# Patient Record
Sex: MALE | Race: WHITE | Employment: OTHER | URBAN - METROPOLITAN AREA
[De-identification: names, ages, dates, MRNs, and addresses within clinical notes are randomized per-mention and may not be internally consistent; named-entity substitution may affect disease eponyms.]

---

## 2021-10-23 ENCOUNTER — APPOINTMENT (OUTPATIENT)
Dept: CT IMAGING | Age: 71
DRG: 381 | End: 2021-10-23
Attending: EMERGENCY MEDICINE
Payer: MEDICARE

## 2021-10-23 ENCOUNTER — HOSPITAL ENCOUNTER (INPATIENT)
Age: 71
LOS: 3 days | Discharge: HOME | DRG: 381 | End: 2021-10-26
Attending: EMERGENCY MEDICINE | Admitting: HOSPITALIST
Payer: MEDICARE

## 2021-10-23 DIAGNOSIS — F10.10 ALCOHOL ABUSE: ICD-10-CM

## 2021-10-23 DIAGNOSIS — K92.2 UPPER GI BLEED: Primary | ICD-10-CM

## 2021-10-23 PROBLEM — K92.0 VOMITING BLOOD: Status: ACTIVE | Noted: 2021-10-23

## 2021-10-23 LAB
ABO + RH BLD: NORMAL
ALBUMIN SERPL-MCNC: 3.2 G/DL (ref 3.5–5)
ALBUMIN/GLOB SERPL: 0.8 {RATIO} (ref 1.1–2.2)
ALP SERPL-CCNC: 139 U/L (ref 45–117)
ALT SERPL-CCNC: 76 U/L (ref 12–78)
ANION GAP SERPL CALC-SCNC: 17 MMOL/L (ref 5–15)
APTT PPP: 23.3 SEC (ref 22.1–31)
AST SERPL-CCNC: 59 U/L (ref 15–37)
BASOPHILS # BLD: 0 K/UL (ref 0–0.1)
BASOPHILS NFR BLD: 1 % (ref 0–1)
BILIRUB SERPL-MCNC: 0.7 MG/DL (ref 0.2–1)
BLOOD GROUP ANTIBODIES SERPL: NORMAL
BNP SERPL-MCNC: 65 PG/ML (ref 0–125)
BUN SERPL-MCNC: 20 MG/DL (ref 6–20)
BUN/CREAT SERPL: 26 (ref 12–20)
CALCIUM SERPL-MCNC: 8.7 MG/DL (ref 8.5–10.1)
CHLORIDE SERPL-SCNC: 102 MMOL/L (ref 97–108)
CO2 SERPL-SCNC: 21 MMOL/L (ref 21–32)
COMMENT, HOLDF: NORMAL
CREAT SERPL-MCNC: 0.78 MG/DL (ref 0.7–1.3)
DIFFERENTIAL METHOD BLD: ABNORMAL
EOSINOPHIL # BLD: 0 K/UL (ref 0–0.4)
EOSINOPHIL NFR BLD: 0 % (ref 0–7)
ERYTHROCYTE [DISTWIDTH] IN BLOOD BY AUTOMATED COUNT: 15.3 % (ref 11.5–14.5)
ETHANOL SERPL-MCNC: 45 MG/DL
GLOBULIN SER CALC-MCNC: 4.1 G/DL (ref 2–4)
GLUCOSE SERPL-MCNC: 193 MG/DL (ref 65–100)
HCT VFR BLD AUTO: 37.2 % (ref 36.6–50.3)
HGB BLD-MCNC: 12.3 G/DL (ref 12.1–17)
IMM GRANULOCYTES # BLD AUTO: 0 K/UL (ref 0–0.04)
IMM GRANULOCYTES NFR BLD AUTO: 1 % (ref 0–0.5)
INR PPP: 1.3 (ref 0.9–1.1)
LYMPHOCYTES # BLD: 1.6 K/UL (ref 0.8–3.5)
LYMPHOCYTES NFR BLD: 22 % (ref 12–49)
MCH RBC QN AUTO: 31.5 PG (ref 26–34)
MCHC RBC AUTO-ENTMCNC: 33.1 G/DL (ref 30–36.5)
MCV RBC AUTO: 95.1 FL (ref 80–99)
MONOCYTES # BLD: 0.4 K/UL (ref 0–1)
MONOCYTES NFR BLD: 5 % (ref 5–13)
NEUTS SEG # BLD: 5.2 K/UL (ref 1.8–8)
NEUTS SEG NFR BLD: 71 % (ref 32–75)
NRBC # BLD: 0 K/UL (ref 0–0.01)
NRBC BLD-RTO: 0 PER 100 WBC
PLATELET # BLD AUTO: 234 K/UL (ref 150–400)
PMV BLD AUTO: 9.6 FL (ref 8.9–12.9)
POTASSIUM SERPL-SCNC: 3.6 MMOL/L (ref 3.5–5.1)
PROT SERPL-MCNC: 7.3 G/DL (ref 6.4–8.2)
PROTHROMBIN TIME: 13.1 SEC (ref 9–11.1)
RBC # BLD AUTO: 3.91 M/UL (ref 4.1–5.7)
SAMPLES BEING HELD,HOLD: NORMAL
SODIUM SERPL-SCNC: 140 MMOL/L (ref 136–145)
SPECIMEN EXP DATE BLD: NORMAL
THERAPEUTIC RANGE,PTTT: NORMAL SECS (ref 58–77)
TROPONIN-HIGH SENSITIVITY: 5 NG/L (ref 0–76)
TROPONIN-HIGH SENSITIVITY: 6 NG/L (ref 0–76)
WBC # BLD AUTO: 7.3 K/UL (ref 4.1–11.1)

## 2021-10-23 PROCEDURE — 74011250637 HC RX REV CODE- 250/637: Performed by: HOSPITALIST

## 2021-10-23 PROCEDURE — C9113 INJ PANTOPRAZOLE SODIUM, VIA: HCPCS | Performed by: EMERGENCY MEDICINE

## 2021-10-23 PROCEDURE — 86901 BLOOD TYPING SEROLOGIC RH(D): CPT

## 2021-10-23 PROCEDURE — 93005 ELECTROCARDIOGRAM TRACING: CPT

## 2021-10-23 PROCEDURE — 74011000250 HC RX REV CODE- 250: Performed by: EMERGENCY MEDICINE

## 2021-10-23 PROCEDURE — 74011250636 HC RX REV CODE- 250/636: Performed by: EMERGENCY MEDICINE

## 2021-10-23 PROCEDURE — 83880 ASSAY OF NATRIURETIC PEPTIDE: CPT

## 2021-10-23 PROCEDURE — 36415 COLL VENOUS BLD VENIPUNCTURE: CPT

## 2021-10-23 PROCEDURE — C9113 INJ PANTOPRAZOLE SODIUM, VIA: HCPCS | Performed by: HOSPITALIST

## 2021-10-23 PROCEDURE — 74011636637 HC RX REV CODE- 636/637: Performed by: EMERGENCY MEDICINE

## 2021-10-23 PROCEDURE — 85025 COMPLETE CBC W/AUTO DIFF WBC: CPT

## 2021-10-23 PROCEDURE — 84484 ASSAY OF TROPONIN QUANT: CPT

## 2021-10-23 PROCEDURE — 85730 THROMBOPLASTIN TIME PARTIAL: CPT

## 2021-10-23 PROCEDURE — 74011250636 HC RX REV CODE- 250/636: Performed by: HOSPITALIST

## 2021-10-23 PROCEDURE — 82077 ASSAY SPEC XCP UR&BREATH IA: CPT

## 2021-10-23 PROCEDURE — 94664 DEMO&/EVAL PT USE INHALER: CPT

## 2021-10-23 PROCEDURE — 74011000250 HC RX REV CODE- 250: Performed by: HOSPITALIST

## 2021-10-23 PROCEDURE — 71275 CT ANGIOGRAPHY CHEST: CPT

## 2021-10-23 PROCEDURE — 99285 EMERGENCY DEPT VISIT HI MDM: CPT

## 2021-10-23 PROCEDURE — 65660000000 HC RM CCU STEPDOWN

## 2021-10-23 PROCEDURE — 74011000636 HC RX REV CODE- 636: Performed by: EMERGENCY MEDICINE

## 2021-10-23 PROCEDURE — 94640 AIRWAY INHALATION TREATMENT: CPT

## 2021-10-23 PROCEDURE — 80053 COMPREHEN METABOLIC PANEL: CPT

## 2021-10-23 PROCEDURE — 77030029684 HC NEB SM VOL KT MONA -A

## 2021-10-23 PROCEDURE — 85610 PROTHROMBIN TIME: CPT

## 2021-10-23 RX ORDER — PREDNISONE 20 MG/1
TABLET ORAL
Status: ON HOLD | COMMUNITY
Start: 2021-10-15 | End: 2021-10-26

## 2021-10-23 RX ORDER — LANOLIN ALCOHOL/MO/W.PET/CERES
325 CREAM (GRAM) TOPICAL 2 TIMES DAILY WITH MEALS
Status: ON HOLD | COMMUNITY
Start: 2021-03-08 | End: 2021-10-26

## 2021-10-23 RX ORDER — SERTRALINE HYDROCHLORIDE 50 MG/1
50 TABLET, FILM COATED ORAL DAILY
Status: ON HOLD | COMMUNITY
Start: 2021-07-01 | End: 2021-10-26

## 2021-10-23 RX ORDER — ARFORMOTEROL TARTRATE 15 UG/2ML
15 SOLUTION RESPIRATORY (INHALATION)
Status: DISCONTINUED | OUTPATIENT
Start: 2021-10-23 | End: 2021-10-26 | Stop reason: HOSPADM

## 2021-10-23 RX ORDER — BUDESONIDE 0.5 MG/2ML
500 INHALANT ORAL 2 TIMES DAILY
COMMUNITY

## 2021-10-23 RX ORDER — SODIUM CHLORIDE 0.9 % (FLUSH) 0.9 %
5-40 SYRINGE (ML) INJECTION AS NEEDED
Status: DISCONTINUED | OUTPATIENT
Start: 2021-10-23 | End: 2021-10-26 | Stop reason: HOSPADM

## 2021-10-23 RX ORDER — MONTELUKAST SODIUM 10 MG/1
10 TABLET ORAL
Status: ON HOLD | COMMUNITY
Start: 2021-04-27 | End: 2021-10-26

## 2021-10-23 RX ORDER — BUDESONIDE 0.5 MG/2ML
500 INHALANT ORAL
Status: DISCONTINUED | OUTPATIENT
Start: 2021-10-23 | End: 2021-10-26 | Stop reason: HOSPADM

## 2021-10-23 RX ORDER — SERTRALINE HYDROCHLORIDE 50 MG/1
50 TABLET, FILM COATED ORAL DAILY
Status: DISCONTINUED | OUTPATIENT
Start: 2021-10-24 | End: 2021-10-26 | Stop reason: HOSPADM

## 2021-10-23 RX ORDER — ACETAMINOPHEN 650 MG/1
650 SUPPOSITORY RECTAL
Status: DISCONTINUED | OUTPATIENT
Start: 2021-10-23 | End: 2021-10-26 | Stop reason: HOSPADM

## 2021-10-23 RX ORDER — IPRATROPIUM BROMIDE AND ALBUTEROL SULFATE 2.5; .5 MG/3ML; MG/3ML
3 SOLUTION RESPIRATORY (INHALATION)
Status: DISCONTINUED | OUTPATIENT
Start: 2021-10-23 | End: 2021-10-24

## 2021-10-23 RX ORDER — MONTELUKAST SODIUM 10 MG/1
10 TABLET ORAL
Status: DISCONTINUED | OUTPATIENT
Start: 2021-10-23 | End: 2021-10-26 | Stop reason: HOSPADM

## 2021-10-23 RX ORDER — ONDANSETRON 2 MG/ML
4 INJECTION INTRAMUSCULAR; INTRAVENOUS
Status: COMPLETED | OUTPATIENT
Start: 2021-10-23 | End: 2021-10-23

## 2021-10-23 RX ORDER — LORAZEPAM 1 MG/1
0.5 TABLET ORAL
COMMUNITY
Start: 2021-09-27

## 2021-10-23 RX ORDER — ONDANSETRON 4 MG/1
4 TABLET, ORALLY DISINTEGRATING ORAL
Status: DISCONTINUED | OUTPATIENT
Start: 2021-10-23 | End: 2021-10-26 | Stop reason: HOSPADM

## 2021-10-23 RX ORDER — ONDANSETRON 2 MG/ML
4 INJECTION INTRAMUSCULAR; INTRAVENOUS
Status: DISCONTINUED | OUTPATIENT
Start: 2021-10-23 | End: 2021-10-26 | Stop reason: HOSPADM

## 2021-10-23 RX ORDER — ONDANSETRON 4 MG/1
4 TABLET, FILM COATED ORAL
COMMUNITY
Start: 2021-09-27

## 2021-10-23 RX ORDER — SODIUM CHLORIDE 0.9 % (FLUSH) 0.9 %
5-40 SYRINGE (ML) INJECTION EVERY 8 HOURS
Status: DISCONTINUED | OUTPATIENT
Start: 2021-10-23 | End: 2021-10-26 | Stop reason: HOSPADM

## 2021-10-23 RX ORDER — LORAZEPAM 2 MG/ML
4 INJECTION INTRAMUSCULAR
Status: DISCONTINUED | OUTPATIENT
Start: 2021-10-23 | End: 2021-10-26 | Stop reason: HOSPADM

## 2021-10-23 RX ORDER — ALBUTEROL SULFATE 90 UG/1
2 AEROSOL, METERED RESPIRATORY (INHALATION)
COMMUNITY
Start: 2021-10-06

## 2021-10-23 RX ORDER — POLYETHYLENE GLYCOL 3350 17 G/17G
17 POWDER, FOR SOLUTION ORAL DAILY PRN
Status: DISCONTINUED | OUTPATIENT
Start: 2021-10-23 | End: 2021-10-26 | Stop reason: HOSPADM

## 2021-10-23 RX ORDER — ACAMPROSATE CALCIUM 333 MG/1
TABLET, DELAYED RELEASE ORAL
Status: ON HOLD | COMMUNITY
Start: 2021-09-28 | End: 2021-10-26

## 2021-10-23 RX ORDER — ACETAMINOPHEN 325 MG/1
650 TABLET ORAL
Status: DISCONTINUED | OUTPATIENT
Start: 2021-10-23 | End: 2021-10-26 | Stop reason: HOSPADM

## 2021-10-23 RX ORDER — FOLIC ACID 1 MG/1
1 TABLET ORAL DAILY
Status: ON HOLD | COMMUNITY
End: 2021-10-26

## 2021-10-23 RX ORDER — ONDANSETRON 2 MG/ML
4 INJECTION INTRAMUSCULAR; INTRAVENOUS
Status: DISCONTINUED | OUTPATIENT
Start: 2021-10-23 | End: 2021-10-23 | Stop reason: SDUPTHER

## 2021-10-23 RX ORDER — FLUTICASONE PROPIONATE AND SALMETEROL XINAFOATE 45; 21 UG/1; UG/1
2 AEROSOL, METERED RESPIRATORY (INHALATION) 2 TIMES DAILY
COMMUNITY
Start: 2021-10-15

## 2021-10-23 RX ORDER — LORAZEPAM 2 MG/ML
2 INJECTION INTRAMUSCULAR
Status: DISCONTINUED | OUTPATIENT
Start: 2021-10-23 | End: 2021-10-26 | Stop reason: HOSPADM

## 2021-10-23 RX ORDER — OCTREOTIDE ACETATE 100 UG/ML
50 INJECTION, SOLUTION INTRAVENOUS; SUBCUTANEOUS
Status: COMPLETED | OUTPATIENT
Start: 2021-10-23 | End: 2021-10-23

## 2021-10-23 RX ORDER — MULTIVITAMIN WITH IRON
1 TABLET ORAL DAILY
Status: ON HOLD | COMMUNITY
Start: 2021-10-15 | End: 2021-10-26

## 2021-10-23 RX ORDER — SODIUM CHLORIDE 9 MG/ML
100 INJECTION, SOLUTION INTRAVENOUS CONTINUOUS
Status: DISCONTINUED | OUTPATIENT
Start: 2021-10-23 | End: 2021-10-26 | Stop reason: HOSPADM

## 2021-10-23 RX ADMIN — MONTELUKAST 10 MG: 10 TABLET, FILM COATED ORAL at 21:01

## 2021-10-23 RX ADMIN — SODIUM CHLORIDE 80 MG: 9 INJECTION, SOLUTION INTRAMUSCULAR; INTRAVENOUS; SUBCUTANEOUS at 16:20

## 2021-10-23 RX ADMIN — METHYLPREDNISOLONE SODIUM SUCCINATE 20 MG: 40 INJECTION, POWDER, FOR SOLUTION INTRAMUSCULAR; INTRAVENOUS at 21:01

## 2021-10-23 RX ADMIN — ARFORMOTEROL TARTRATE 15 MCG: 15 SOLUTION RESPIRATORY (INHALATION) at 20:38

## 2021-10-23 RX ADMIN — Medication 10 ML: at 21:02

## 2021-10-23 RX ADMIN — SODIUM CHLORIDE 1000 ML: 9 INJECTION, SOLUTION INTRAVENOUS at 13:29

## 2021-10-23 RX ADMIN — IPRATROPIUM BROMIDE AND ALBUTEROL SULFATE 3 ML: .5; 3 SOLUTION RESPIRATORY (INHALATION) at 20:38

## 2021-10-23 RX ADMIN — FOLIC ACID: 5 INJECTION, SOLUTION INTRAMUSCULAR; INTRAVENOUS; SUBCUTANEOUS at 22:04

## 2021-10-23 RX ADMIN — BUDESONIDE 500 MCG: 0.5 INHALANT RESPIRATORY (INHALATION) at 20:38

## 2021-10-23 RX ADMIN — ONDANSETRON 4 MG: 2 INJECTION INTRAMUSCULAR; INTRAVENOUS at 17:37

## 2021-10-23 RX ADMIN — OCTREOTIDE ACETATE 50 MCG: 100 INJECTION, SOLUTION INTRAVENOUS; SUBCUTANEOUS at 17:51

## 2021-10-23 RX ADMIN — SODIUM CHLORIDE 100 ML/HR: 9 INJECTION, SOLUTION INTRAVENOUS at 17:52

## 2021-10-23 RX ADMIN — SODIUM CHLORIDE 40 MG: 9 INJECTION INTRAMUSCULAR; INTRAVENOUS; SUBCUTANEOUS at 21:01

## 2021-10-23 RX ADMIN — IOPAMIDOL 80 ML: 755 INJECTION, SOLUTION INTRAVENOUS at 14:19

## 2021-10-23 NOTE — ED PROVIDER NOTES
The history is provided by the patient. Shortness of Breath  This is a recurrent problem. The problem occurs intermittently. The current episode started 6 to 12 hours ago. The problem has not changed since onset. Associated symptoms include cough, sputum production, chest pain and leg swelling. Pertinent negatives include no fever, no headaches, no coryza, no rhinorrhea, no sore throat, no swollen glands, no ear pain, no neck pain, no hemoptysis, no wheezing, no PND, no orthopnea, no syncope, no vomiting, no abdominal pain, no rash, no leg pain and no claudication. He has tried nothing for the symptoms. The treatment provided no relief. He has had prior hospitalizations. He has had prior ED visits. Associated medical issues include asthma and PE. Associated medical issues do not include COPD. History reviewed. No pertinent past medical history. History reviewed. No pertinent surgical history. History reviewed. No pertinent family history. Social History     Socioeconomic History    Marital status:      Spouse name: Not on file    Number of children: Not on file    Years of education: Not on file    Highest education level: Not on file   Occupational History    Not on file   Tobacco Use    Smoking status: Never Smoker   Vaping Use    Vaping Use: Never used   Substance and Sexual Activity    Alcohol use: Yes     Comment: Daily drinker; 4oz     Drug use: Never    Sexual activity: Not on file   Other Topics Concern    Not on file   Social History Narrative    Not on file     Social Determinants of Health     Financial Resource Strain:     Difficulty of Paying Living Expenses:    Food Insecurity:     Worried About Running Out of Food in the Last Year:     [address] in the Last Year:    Transportation Needs:     Lack of Transportation (Medical):      Lack of Transportation (Non-Medical):    Physical Activity:     Days of Exercise per Week:     Minutes of Exercise per Session:    Stress:     Feeling of Stress :    Social Connections:     Frequency of Communication with Friends and Family:     Frequency of Social Gatherings with Friends and Family:     Attends [employer] Services:     Active Member of Clubs or Organizations:     Attends Club or Organization Meetings:     Marital Status:    Intimate Partner Violence:     Fear of Current or Ex-Partner:     Emotionally Abused:     Physically Abused:     Sexually Abused: ALLERGIES: Patient has no known allergies. Review of Systems   Constitutional: Negative for activity change, chills and fever. HENT: Negative for ear pain, nosebleeds, rhinorrhea, sore throat, trouble swallowing and voice change. Eyes: Negative for visual disturbance. Respiratory: Positive for cough, sputum production and shortness of breath. Negative for hemoptysis and wheezing. Cardiovascular: Positive for chest pain and leg swelling. Negative for palpitations, orthopnea, claudication, syncope and PND. Gastrointestinal: Negative for abdominal pain, constipation, diarrhea, nausea and vomiting. Genitourinary: Negative for difficulty urinating, dysuria, hematuria and urgency. Musculoskeletal: Negative for back pain, neck pain and neck stiffness. Skin: Negative for color change and rash. Allergic/Immunologic: Negative for immunocompromised state. Neurological: Negative for dizziness, seizures, syncope, weakness, light-headedness, numbness and headaches. Psychiatric/Behavioral: Negative for behavioral problems, confusion, hallucinations, self-injury and suicidal ideas. There were no vitals filed for this visit. Physical Exam  Vitals and nursing note reviewed. Constitutional:       General: He is not in acute distress. Appearance: He is well-developed. He is not diaphoretic. HENT:      Head: Normocephalic and atraumatic. Eyes:      Pupils: Pupils are equal, round, and reactive to light. Cardiovascular:      Rate and Rhythm: Regular rhythm. Tachycardia present. Heart sounds: Normal heart sounds. No murmur heard. No friction rub. No gallop. Pulmonary:      Effort: Pulmonary effort is normal. No respiratory distress. Breath sounds: Normal breath sounds. No wheezing. Abdominal:      General: Bowel sounds are normal. There is no distension. Palpations: Abdomen is soft. Tenderness: There is no abdominal tenderness. There is no guarding or rebound. Musculoskeletal:         General: Normal range of motion. Cervical back: Normal range of motion and neck supple. Skin:     General: Skin is warm. Findings: No rash. Neurological:      Mental Status: He is alert and oriented to person, place, and time. Psychiatric:         Behavior: Behavior normal.         Thought Content: Thought content normal.         Judgment: Judgment normal.          MDM     This is a 66-year-old male with past medical history, review of systems, physical exam as above, presenting with complaints of shortness of breath, cough. Patient states a history of asthma, states he is unclear as to his medications but states he is compliant with them. Chart review indicates patient was evaluated at [facility] [place] this morning as well as yesterday. Patient states he presented, however was not seen. Chart review indicates a history of asthma, alcohol abuse, PE. Patient states PE is a recent diagnosis he is compliant with novel anticoagulants. He endorses chest pain \"last week\". He states cough productive of white sputum, he states he did not receive nebulizer in route via EMS. Physical exam is remarkable for dyspneic appearing elderly male, in mild respiratory distress, noted to be tachycardic, mildly hypertensive, satting well on room air. He has clear breath sounds to auscultation, rapid regular heart rate, soft nontender abdomen, no significant pedal edema.   Discussed with patient differential for dyspnea and tachycardia, including refractory PE, exacerbation of asthma however less likely given normal lung exam.  Patient denies a history of coronary artery disease. Plan to obtain CMP, CBC, EKG, chest x-ray, cardiac enzymes, BMP, blood alcohol. Will obtain CTA of the chest.  We will reassess, and make a disposition. Procedures    Perfect Serve Consult for Admission  4:01 PM    ED Room Number: SER03/03  Patient Name and age: Harjinder Doherty 70 y.o.  male  Working Diagnosis:   1. Upper GI bleed    2.  Alcohol abuse        COVID-19 Suspicion:  no  Sepsis present:  no  Reassessment needed: yes  Code Status:  Full Code  Readmission: no  Isolation Requirements:  no  Recommended Level of Care:  telemetry  Department:[facility] - [phone]  Other:  D/w Dr. Abdiaziz Arnold

## 2021-10-23 NOTE — ED TRIAGE NOTES
Triage Note: Patient arrives to by [facility] complaining of blood sputum unwitness and SOB, hx of COPD and asthma. Pt was seen at [facility] twice at but AMA due to [facility] of care\". Pt continuously takes albuterol treatments for relief.

## 2021-10-23 NOTE — PROGRESS NOTES
TRANSFER - IN REPORT:    Verbal report received from RN(name) on Alan Sides  being received from [facility](unit) for routine progression of care      Report consisted of patients Situation, Background, Assessment and   Recommendations(SBAR). Information from the following report(s) SBAR, Kardex and ED Summary was reviewed with the receiving nurse. Opportunity for questions and clarification was provided. Assessment completed upon patients arrival to unit and care assumed.

## 2021-10-24 ENCOUNTER — APPOINTMENT (OUTPATIENT)
Dept: VASCULAR SURGERY | Age: 71
DRG: 381 | End: 2021-10-24
Attending: HOSPITALIST
Payer: MEDICARE

## 2021-10-24 LAB
ALBUMIN SERPL-MCNC: 2.5 G/DL (ref 3.5–5)
ALBUMIN/GLOB SERPL: 0.6 {RATIO} (ref 1.1–2.2)
ALP SERPL-CCNC: 105 U/L (ref 45–117)
ALT SERPL-CCNC: 49 U/L (ref 12–78)
ANION GAP SERPL CALC-SCNC: 3 MMOL/L (ref 5–15)
AST SERPL-CCNC: 29 U/L (ref 15–37)
ATRIAL RATE: 117 BPM
BASOPHILS # BLD: 0 K/UL (ref 0–0.1)
BASOPHILS NFR BLD: 0 % (ref 0–1)
BILIRUB SERPL-MCNC: 0.9 MG/DL (ref 0.2–1)
BUN SERPL-MCNC: 15 MG/DL (ref 6–20)
BUN/CREAT SERPL: 21 (ref 12–20)
CALCIUM SERPL-MCNC: 8.3 MG/DL (ref 8.5–10.1)
CALCULATED P AXIS, ECG09: 49 DEGREES
CALCULATED R AXIS, ECG10: 66 DEGREES
CALCULATED T AXIS, ECG11: 51 DEGREES
CHLORIDE SERPL-SCNC: 110 MMOL/L (ref 97–108)
CO2 SERPL-SCNC: 24 MMOL/L (ref 21–32)
CREAT SERPL-MCNC: 0.7 MG/DL (ref 0.7–1.3)
DIAGNOSIS, 93000: NORMAL
DIFFERENTIAL METHOD BLD: ABNORMAL
EOSINOPHIL # BLD: 0 K/UL (ref 0–0.4)
EOSINOPHIL NFR BLD: 0 % (ref 0–7)
ERYTHROCYTE [DISTWIDTH] IN BLOOD BY AUTOMATED COUNT: 14.9 % (ref 11.5–14.5)
GLOBULIN SER CALC-MCNC: 3.9 G/DL (ref 2–4)
GLUCOSE SERPL-MCNC: 147 MG/DL (ref 65–100)
HCT VFR BLD AUTO: 31 % (ref 36.6–50.3)
HGB BLD-MCNC: 10.3 G/DL (ref 12.1–17)
IMM GRANULOCYTES # BLD AUTO: 0 K/UL (ref 0–0.04)
IMM GRANULOCYTES NFR BLD AUTO: 1 % (ref 0–0.5)
LYMPHOCYTES # BLD: 0.7 K/UL (ref 0.8–3.5)
LYMPHOCYTES NFR BLD: 15 % (ref 12–49)
MAGNESIUM SERPL-MCNC: 1.7 MG/DL (ref 1.6–2.4)
MCH RBC QN AUTO: 32.1 PG (ref 26–34)
MCHC RBC AUTO-ENTMCNC: 33.2 G/DL (ref 30–36.5)
MCV RBC AUTO: 96.6 FL (ref 80–99)
MONOCYTES # BLD: 0.2 K/UL (ref 0–1)
MONOCYTES NFR BLD: 4 % (ref 5–13)
NEUTS SEG # BLD: 3.9 K/UL (ref 1.8–8)
NEUTS SEG NFR BLD: 80 % (ref 32–75)
NRBC # BLD: 0 K/UL (ref 0–0.01)
NRBC BLD-RTO: 0 PER 100 WBC
P-R INTERVAL, ECG05: 136 MS
PHOSPHATE SERPL-MCNC: 2.3 MG/DL (ref 2.6–4.7)
PLATELET # BLD AUTO: 167 K/UL (ref 150–400)
PMV BLD AUTO: 9.6 FL (ref 8.9–12.9)
POTASSIUM SERPL-SCNC: 3.9 MMOL/L (ref 3.5–5.1)
PROT SERPL-MCNC: 6.4 G/DL (ref 6.4–8.2)
Q-T INTERVAL, ECG07: 286 MS
QRS DURATION, ECG06: 82 MS
QTC CALCULATION (BEZET), ECG08: 398 MS
RBC # BLD AUTO: 3.21 M/UL (ref 4.1–5.7)
RBC MORPH BLD: ABNORMAL
RBC MORPH BLD: ABNORMAL
SODIUM SERPL-SCNC: 137 MMOL/L (ref 136–145)
VENTRICULAR RATE, ECG03: 117 BPM
WBC # BLD AUTO: 4.8 K/UL (ref 4.1–11.1)

## 2021-10-24 PROCEDURE — 74011000250 HC RX REV CODE- 250: Performed by: HOSPITALIST

## 2021-10-24 PROCEDURE — 94640 AIRWAY INHALATION TREATMENT: CPT

## 2021-10-24 PROCEDURE — 84100 ASSAY OF PHOSPHORUS: CPT

## 2021-10-24 PROCEDURE — 74011250637 HC RX REV CODE- 250/637: Performed by: HOSPITALIST

## 2021-10-24 PROCEDURE — 83735 ASSAY OF MAGNESIUM: CPT

## 2021-10-24 PROCEDURE — 85025 COMPLETE CBC W/AUTO DIFF WBC: CPT

## 2021-10-24 PROCEDURE — 74011250636 HC RX REV CODE- 250/636: Performed by: HOSPITALIST

## 2021-10-24 PROCEDURE — 93970 EXTREMITY STUDY: CPT

## 2021-10-24 PROCEDURE — 36415 COLL VENOUS BLD VENIPUNCTURE: CPT

## 2021-10-24 PROCEDURE — 74011636637 HC RX REV CODE- 636/637: Performed by: INTERNAL MEDICINE

## 2021-10-24 PROCEDURE — 80053 COMPREHEN METABOLIC PANEL: CPT

## 2021-10-24 PROCEDURE — C9113 INJ PANTOPRAZOLE SODIUM, VIA: HCPCS | Performed by: HOSPITALIST

## 2021-10-24 PROCEDURE — 74011250636 HC RX REV CODE- 250/636: Performed by: INTERNAL MEDICINE

## 2021-10-24 PROCEDURE — 65660000000 HC RM CCU STEPDOWN

## 2021-10-24 RX ORDER — LORAZEPAM 0.5 MG/1
0.5 TABLET ORAL 3 TIMES DAILY
Status: DISCONTINUED | OUTPATIENT
Start: 2021-10-24 | End: 2021-10-26 | Stop reason: HOSPADM

## 2021-10-24 RX ORDER — LORAZEPAM 0.5 MG/1
0.5 TABLET ORAL 3 TIMES DAILY
Status: DISCONTINUED | OUTPATIENT
Start: 2021-10-24 | End: 2021-10-24

## 2021-10-24 RX ORDER — IPRATROPIUM BROMIDE AND ALBUTEROL SULFATE 2.5; .5 MG/3ML; MG/3ML
3 SOLUTION RESPIRATORY (INHALATION)
Status: DISCONTINUED | OUTPATIENT
Start: 2021-10-24 | End: 2021-10-25

## 2021-10-24 RX ADMIN — OCTREOTIDE ACETATE 50 MCG/HR: 500 INJECTION, SOLUTION INTRAVENOUS; SUBCUTANEOUS at 16:37

## 2021-10-24 RX ADMIN — METHYLPREDNISOLONE SODIUM SUCCINATE 20 MG: 40 INJECTION, POWDER, FOR SOLUTION INTRAMUSCULAR; INTRAVENOUS at 21:28

## 2021-10-24 RX ADMIN — LORAZEPAM 0.5 MG: 0.5 TABLET ORAL at 13:22

## 2021-10-24 RX ADMIN — METHYLPREDNISOLONE SODIUM SUCCINATE 20 MG: 40 INJECTION, POWDER, FOR SOLUTION INTRAMUSCULAR; INTRAVENOUS at 08:45

## 2021-10-24 RX ADMIN — IPRATROPIUM BROMIDE AND ALBUTEROL SULFATE 3 ML: .5; 3 SOLUTION RESPIRATORY (INHALATION) at 12:29

## 2021-10-24 RX ADMIN — FOLIC ACID: 5 INJECTION, SOLUTION INTRAMUSCULAR; INTRAVENOUS; SUBCUTANEOUS at 21:38

## 2021-10-24 RX ADMIN — Medication 10 ML: at 21:36

## 2021-10-24 RX ADMIN — ARFORMOTEROL TARTRATE 15 MCG: 15 SOLUTION RESPIRATORY (INHALATION) at 09:02

## 2021-10-24 RX ADMIN — SODIUM CHLORIDE 40 MG: 9 INJECTION INTRAMUSCULAR; INTRAVENOUS; SUBCUTANEOUS at 08:45

## 2021-10-24 RX ADMIN — IPRATROPIUM BROMIDE AND ALBUTEROL SULFATE 3 ML: .5; 3 SOLUTION RESPIRATORY (INHALATION) at 04:13

## 2021-10-24 RX ADMIN — LORAZEPAM 0.5 MG: 0.5 TABLET ORAL at 21:27

## 2021-10-24 RX ADMIN — MONTELUKAST 10 MG: 10 TABLET, FILM COATED ORAL at 21:27

## 2021-10-24 RX ADMIN — SODIUM CHLORIDE 40 MG: 9 INJECTION INTRAMUSCULAR; INTRAVENOUS; SUBCUTANEOUS at 21:28

## 2021-10-24 RX ADMIN — Medication 10 ML: at 06:27

## 2021-10-24 RX ADMIN — IPRATROPIUM BROMIDE AND ALBUTEROL SULFATE 3 ML: .5; 3 SOLUTION RESPIRATORY (INHALATION) at 09:07

## 2021-10-24 RX ADMIN — BUDESONIDE 500 MCG: 0.5 INHALANT RESPIRATORY (INHALATION) at 09:02

## 2021-10-24 RX ADMIN — SODIUM CHLORIDE 100 ML/HR: 9 INJECTION, SOLUTION INTRAVENOUS at 15:13

## 2021-10-24 NOTE — H&P
History & Physical    Primary Care Provider: Sarmad Royal MD  Source of Information: Patient     History of Presenting Illness:   Sergei Brown is a 70 y.o. male who presents with nausea and vomiting blood     66-year-old white male history of COPD asthma, daily alcohol drink with 2/4 shots of vodka, patient also was diagnosed a single subsegment PE on October 15 in [facility] emergency room, he was giving Eliquis and a discharged home. Patient did visit to [facility] twice last 2 days due to worsening breathing issues, but he left AMA due to long waiting time. Today around 9 AM, patient started to having some nauseous, he vomited small amount of the fresh blood at home, because that he came to [facility]. In ED, nurse witnessed bright red blood vomiting. Pt denied fever, has mild cough, which is chronic. Feels his breathing worse last 2-3 days. Received 3 doses of covid 19 vaccine     Review of Systems:  General: HPI, no changes of weight, no fever   HEENT: no headache, no vision changes, no nose discharge, no hearing changes   RES: HPI   CVS: no cp, no palpitation. Muscular: no joint swelling, no muscle pain, no leg swelling  Skin: no rash, no itching   GI: HPI, no diarrhea, mild epigastric pain. Mild nausea. : no dysuria, no hematuria  Hemo: no gum bleeding, no petechial   Neuro: no sensation changes, no focal weakness , + for tremor if stop drinking for a day   Endo: no polydipsia   Psych: denied depression     Past Medical History:   Diagnosis Date    Chronic obstructive pulmonary disease ([facility] [address]       History reviewed. No pertinent surgical history. Prior to Admission medications    Medication Sig Start Date End Date Taking? Authorizing Provider   albuterol (PROVENTIL HFA, VENTOLIN HFA, PROAIR HFA) 90 mcg/actuation inhaler Take 2 Puffs by inhalation. 10/6/21  Yes Other, MD Janine   apixaban (ELIQUIS) 5 mg tablet Take  by mouth.  10/15/21 11/14/21 Yes Other, MD Janine Advair HFA 45-21 mcg/actuation inhaler  10/15/21  Yes Other, MD Janine   LORazepam (ATIVAN) 1 mg tablet  9/27/21  Yes Other, MD Janine   ondansetron hcl (ZOFRAN) 4 mg tablet  9/27/21  Yes Other, MD Janine   montelukast (SINGULAIR) 10 mg tablet Take 10 mg by mouth nightly. 4/27/21  Yes Other, MD Janine   predniSONE (DELTASONE) 20 mg tablet  10/15/21  Yes Other, MD Janine   sertraline (ZOLOFT) 50 mg tablet Take 50 mg by mouth daily. 7/1/21  Yes Other, MD Janine   acamprosate (CAMPRAL) 333 mg tablet  9/28/21  Yes Other, MD Janine   ferrous sulfate 325 mg (65 mg iron) tablet Take 325 mg by mouth two (2) times daily (with meals). 3/8/21  Yes Other, MD Janine   multivitamin with iron (Daily Multiple Vitamins/Iron) tablet Take 1 Tablet by mouth daily. 10/15/21  Yes Other, MD Janine   folic acid (FOLVITE) 1 mg tablet Take 1 mg by mouth daily. Yes Other, MD Janine   budesonide (PULMICORT) 0.5 mg/2 mL nbsp 500 mcg by Nebulization route. Yes Other, MD Janine     No Known Allergies   History reviewed. No pertinent family history. SOCIAL HISTORY:  Patient resides:  Independently x   Assisted Living    SNF    With family care       Smoking history:   None    Former x   Chronic      Alcohol history:   None    Social    Chronic x     Ambulates:   Independently x   w/cane    w/walker    w/wc    CODE STATUS:  DNR    Full x   Other      Objective:     Physical Exam:     Visit Vitals  /79 (BP 1 Location: Right upper arm, BP Patient Position: Lying)   Pulse 98   Temp 97.9 °F (36.6 °C)   Resp 18   Ht 5' 7\" (1.702 m)   Wt 75.6 kg (166 lb 10.7 oz)   SpO2 97%   BMI 26.10 kg/m²      O2 Device: None (Room air)    General:  Alert, cooperative, no distress, appears stated age. Head:  Normocephalic, without obvious abnormality, atraumatic. Eyes:  Conjunctivae/corneas clear. PERRL, EOMs intact. Nose: Nares normal. Septum midline. Mucosa normal. No drainage or sinus tenderness.    Throat: Lips, mucosa, and tongue normal. Teeth and gums normal.   Neck: Supple, symmetrical, trachea midline, no adenopathy, thyroid: no enlargement/tenderness/nodules, no carotid bruit and no JVD. Back:   Symmetric, no curvature. ROM normal. No CVA tenderness. Lungs:   Clear to auscultation bilaterally. Chest wall:  No tenderness or deformity. Heart:  Regular rate and rhythm, S1, S2 normal, no murmur, click, rub or gallop. Abdomen:   Soft, non-tender. Bowel sounds normal. No masses,  No organomegaly. Extremities: Extremities normal, atraumatic, no cyanosis or edema. Pulses: 2+ and symmetric all extremities. Skin: Skin color, texture, turgor normal. No rashes or lesions   Neurologic: CNII-XII intact. Data Review:     Recent Days:  Recent Labs     10/23/21  1237   WBC 7.3   HGB 12.3   HCT 37.2        Recent Labs     10/23/21  1631 10/23/21  1237   NA  --  140   K  --  3.6   CL  --  102   CO2  --  21   GLU  --  193*   BUN  --  20   CREA  --  0.78   CA  --  8.7   ALB  --  3.2*   ALT  --  76   INR 1.3*  --      No results for input(s): PH, PCO2, PO2, HCO3, FIO2 in the last 72 hours. 24 Hour Results:  Recent Results (from the past 24 hour(s))   EKG, 12 LEAD, INITIAL    Collection Time: 10/23/21 11:56 AM   Result Value Ref Range    Ventricular Rate 117 BPM    Atrial Rate 117 BPM    P-R Interval 136 ms    QRS Duration 82 ms    Q-T Interval 286 ms    QTC Calculation (Bezet) 398 ms    Calculated P Axis 49 degrees    Calculated R Axis 66 degrees    Calculated T Axis 51 degrees    Diagnosis       Sinus tachycardia  Nonspecific ST and T wave abnormality  Abnormal ECG  No previous ECGs available     SAMPLES BEING HELD    Collection Time: 10/23/21 12:37 PM   Result Value Ref Range    SAMPLES BEING HELD 1BLUE     COMMENT        Add-on orders for these samples will be processed based on acceptable specimen integrity and analyte stability, which may vary by analyte.    CBC WITH AUTOMATED DIFF    Collection Time: 10/23/21 12:37 PM   Result Value Ref Range    WBC 7.3 4.1 - 11.1 K/uL    RBC 3.91 (L) 4.10 - 5.70 M/uL    HGB 12.3 12.1 - 17.0 g/dL    HCT 37.2 36.6 - 50.3 %    MCV 95.1 80.0 - 99.0 FL    MCH 31.5 26.0 - 34.0 PG    MCHC 33.1 30.0 - 36.5 g/dL    RDW 15.3 (H) 11.5 - 14.5 %    PLATELET 969 545 - 632 K/uL    MPV 9.6 8.9 - 12.9 FL    NRBC 0.0 0  WBC    ABSOLUTE NRBC 0.00 0.00 - 0.01 K/uL    NEUTROPHILS 71 32 - 75 %    LYMPHOCYTES 22 12 - 49 %    MONOCYTES 5 5 - 13 %    EOSINOPHILS 0 0 - 7 %    BASOPHILS 1 0 - 1 %    IMMATURE GRANULOCYTES 1 (H) 0.0 - 0.5 %    ABS. NEUTROPHILS 5.2 1.8 - 8.0 K/UL    ABS. LYMPHOCYTES 1.6 0.8 - 3.5 K/UL    ABS. MONOCYTES 0.4 0.0 - 1.0 K/UL    ABS. EOSINOPHILS 0.0 0.0 - 0.4 K/UL    ABS. BASOPHILS 0.0 0.0 - 0.1 K/UL    ABS. IMM. GRANS. 0.0 0.00 - 0.04 K/UL    DF AUTOMATED     METABOLIC PANEL, COMPREHENSIVE    Collection Time: 10/23/21 12:37 PM   Result Value Ref Range    Sodium 140 136 - 145 mmol/L    Potassium 3.6 3.5 - 5.1 mmol/L    Chloride 102 97 - 108 mmol/L    CO2 21 21 - 32 mmol/L    Anion gap 17 (H) 5 - 15 mmol/L    Glucose 193 (H) 65 - 100 mg/dL    BUN 20 6 - 20 MG/DL    Creatinine 0.78 0.70 - 1.30 MG/DL    BUN/Creatinine ratio 26 (H) 12 - 20      GFR est AA >60 >60 ml/min/1.73m2    GFR est non-AA >60 >60 ml/min/1.73m2    Calcium 8.7 8.5 - 10.1 MG/DL    Bilirubin, total 0.7 0.2 - 1.0 MG/DL    ALT (SGPT) 76 12 - 78 U/L    AST (SGOT) 59 (H) 15 - 37 U/L    Alk.  phosphatase 139 (H) 45 - 117 U/L    Protein, total 7.3 6.4 - 8.2 g/dL    Albumin 3.2 (L) 3.5 - 5.0 g/dL    Globulin 4.1 (H) 2.0 - 4.0 g/dL    A-G Ratio 0.8 (L) 1.1 - 2.2     NT-PRO BNP    Collection Time: 10/23/21 12:37 PM   Result Value Ref Range    NT pro-BNP 65 0 - 125 PG/ML   ETHYL ALCOHOL    Collection Time: 10/23/21 12:37 PM   Result Value Ref Range    ALCOHOL(ETHYL),SERUM 45 (H) <10 MG/DL   TROPONIN-HIGH SENSITIVITY    Collection Time: 10/23/21 12:37 PM   Result Value Ref Range    Troponin-High Sensitivity 6 0 - 76 ng/L   TROPONIN-HIGH SENSITIVITY Collection Time: 10/23/21  2:35 PM   Result Value Ref Range    Troponin-High Sensitivity 5 0 - 76 ng/L   PROTHROMBIN TIME + INR    Collection Time: 10/23/21  4:31 PM   Result Value Ref Range    INR 1.3 (H) 0.9 - 1.1      Prothrombin time 13.1 (H) 9.0 - 11.1 sec   PTT    Collection Time: 10/23/21  4:31 PM   Result Value Ref Range    aPTT 23.3 22.1 - 31.0 sec    aPTT, therapeutic range     58.0 - 77.0 SECS   TYPE & SCREEN    Collection Time: 10/23/21  4:31 PM   Result Value Ref Range    Crossmatch Expiration 10/26/2021,2359     ABO/Rh(D) B POSITIVE     Antibody screen NEG          Imaging:   CTA CHEST W OR W WO CONT    Result Date: 10/23/2021  No evidence for pulmonary embolism. Hepatic steatosis. Assessment:     Active Problems:    Alcohol abuse (10/23/2021)      Vomiting blood (10/23/2021)           Plan:     1. GIB: upper GIB, may due to MW tear, or PUD or gastritis on top of eliquis. Stop eliquis, trending cbc, ivf. NPO , GI consult. Prn zofran. 2. Sob/copd exacerbation: CT of chest reviewed, no pneumonia. No PE. Possible the small PE that reported in [facility] 7 days ago was false positive reading. Stop eliquis due to bleeding. Will check leg venous doppler . Start duoneb. Pulmicort, iv steroids for copd exacerbation. 3. Alcohol abuse: high risk for DT/withdrawl per pt.  Continue ciwa protocal        Signed By: Piero Aguila MD     October 23, 2021

## 2021-10-24 NOTE — CONSULTS
Gastroenterology Consult     Referring Physician: lAlan Fraser Date: 10/24/2021     Subjective:     Chief Complaint: Hematemesis    History of Present Illness: Corrie William is a 70 y.o. male who is seen in consultation for hematemesis. This is a 69 y/o M with h/o ETOH abuse, COPD, recent PE diagnosis on Eliquis who p/w hematemesis with bright red blood. He was recently diagnosed with PE on October 15th at [facility] and subsequently started on Eliquis. He went to [facility] again earlier this week for worsening shortness of breath but left AMA because of the long wait. Pt states prior to this hospitalization he was feeling nausea and had episodes of dry heaving. This was followed by an episode of hematemesis with a large amount of bright red blood seen. He states he had multiple further episodes of dry heaving with streaks of blood seen. He presented to [facility] ER with one more episode of hematemesis. He has not had any further episodes. He denies melena or hematochezia. He denies abdominal pain or further episodes of vomiting. He does note his shortness of breath was worsening prior to admission, Workup negative for pulmonary embolism. Started on IV steroids, pulmicort and duonebs with improvement of his breathing. He denies prior episodes of GI bleeding. He denies use of NSAIDs. Eliquis was started Oct 15th. He has a h/o chronic ETOH abuse and recently cut down. Drinks 2-3oz of vodka daily, was drinking up to 4oz daily 2 mo ago. He follows with [facility], last seen three months ago. Told he had ETOH liver disease but not cirrhosis and was recommended to stop drinking. He states having an EGD in the past a few years ago by the GI group for indigestion with negative findings. Past Medical History:   Diagnosis Date    Chronic obstructive pulmonary disease ([address]      History reviewed. No pertinent surgical history. History reviewed. No pertinent family history.   Social History     Tobacco Use    Smoking status: Never Smoker    Smokeless tobacco: Never Used   Substance Use Topics    Alcohol use: Yes     Comment: Daily drinker; 4oz       No Known Allergies  Current Facility-Administered Medications   Medication Dose Route Frequency    LORazepam (ATIVAN) tablet 0.5 mg  0.5 mg Oral TID    0.9% sodium chloride infusion  100 mL/hr IntraVENous CONTINUOUS    pantoprazole (PROTONIX) 40 mg in 0.9% sodium chloride 10 mL injection  40 mg IntraVENous Q12H    albuterol-ipratropium (DUO-NEB) 2.5 MG-0.5 MG/3 ML  3 mL Nebulization Q4H RT    arformoteroL (BROVANA) neb solution 15 mcg  15 mcg Nebulization BID RT    And    budesonide (PULMICORT) 500 mcg/2 ml nebulizer suspension  500 mcg Nebulization BID RT    montelukast (SINGULAIR) tablet 10 mg  10 mg Oral QHS    sertraline (ZOLOFT) tablet 50 mg  50 mg Oral DAILY    methylPREDNISolone (PF) (SOLU-MEDROL) injection 20 mg  20 mg IntraVENous Q12H    LORazepam (ATIVAN) injection 2 mg  2 mg IntraVENous Q1H PRN    LORazepam (ATIVAN) injection 4 mg  4 mg IntraVENous Q1H PRN    0.9% sodium chloride 7,143 mL with folic acid 1 mg, thiamine 100 mg infusion   IntraVENous Q24H    sodium chloride (NS) flush 5-40 mL  5-40 mL IntraVENous Q8H    sodium chloride (NS) flush 5-40 mL  5-40 mL IntraVENous PRN    acetaminophen (TYLENOL) tablet 650 mg  650 mg Oral Q6H PRN    Or    acetaminophen (TYLENOL) suppository 650 mg  650 mg Rectal Q6H PRN    polyethylene glycol (MIRALAX) packet 17 g  17 g Oral DAILY PRN    ondansetron (ZOFRAN ODT) tablet 4 mg  4 mg Oral Q8H PRN    Or    ondansetron (ZOFRAN) injection 4 mg  4 mg IntraVENous Q6H PRN        Review of Systems:  A detailed 10 organ review of systems is obtained with pertinent positives as listed in the History of Present Illness and Past Medical History. All others are negative.     Objective:     Physical Exam:  Visit Vitals  BP (!) 136/90   Pulse 98   Temp 98.2 °F (36.8 °C)   Resp 16   Ht 5' 7\" (1.702 m)   Wt 76 kg (167 lb 9.6 oz)   SpO2 97%   BMI 26.25 kg/m²        Skin:  Extremities and face reveal no rashes. No araujo erythema. No telangiectasias on the chest wall. HEENT: Sclerae anicteric. Extra-occular muscles are intact. No oral ulcers. No abnormal pigmentation of the lips. The neck is supple. Cardiovascular: Regular rate and rhythm. No murmurs, gallops, or rubs. PMI nondisplaced. Carotids without bruits. Respiratory:  Comfortable breathing with no accessory muscle use. Clear breath sounds with no wheezes, rales, or rhonchi. GI:  Abdomen nondistended, soft, and nontender. Normal active bowel sounds. No enlargement of the liver or spleen. No masses palpable. Rectal:  Deferred  Musculoskeletal:  No pitting edema of the lower legs. Extremities have good range of motion. No costovertebral tenderness. Neurological:  Gross memory appears intact. Patient is alert and oriented. Psychiatric:  Mood appears appropriate with judgement intact. Lymphatic:  No cervical or supraclavicular adenopathy. Lab/Data Review: All lab results for the last 24 hours reviewed. Assessment/Plan:     Active Problems:    Alcohol abuse (10/23/2021)      Vomiting blood (10/23/2021)         Hematemesis- Multiple episodes of hematemesis. On Eliquis. Dry heaving prior to the episodes and with ETOH abuse. Hgb 12. Hemodynamically stable. Suspect MWT vs ETOH gastritis. .   -given ETOH h/o and on anticoagulation will plan for EGD. Tentative plan for tomorrow. -PPI IV BID. Recommend octreotide gtt for now until variceal etiology ruled out.  -serial H&H and transfuse Hgb to goal of 7-8  -Clear liquid diet today. NPO after midnight    Elevated LFTs- suspect ETOH related liver disease. No h/o cirrhosis. Plan for RUQ ultrasound. ETOH abuse- recommended ETOH cessation.

## 2021-10-24 NOTE — PROGRESS NOTES
Problem: Falls - Risk of  Goal: *Absence of Falls  Description: Document Brown Reason Fall Risk and appropriate interventions in the flowsheet.   Outcome: Progressing Towards Goal  Note: Fall Risk Interventions:            Medication Interventions: Patient to call before getting OOB, Teach patient to arise slowly                   Problem: Patient Education: Go to Patient Education Activity  Goal: Patient/Family Education  Outcome: Progressing Towards Goal     Problem: Breathing Pattern - Ineffective  Goal: *Use of effective breathing techniques  Outcome: Progressing Towards Goal  Goal: *PALLIATIVE CARE:  Alleviation of Dyspnea  Outcome: Progressing Towards Goal     Problem: Patient Education: Go to Patient Education Activity  Goal: Patient/Family Education  Outcome: Progressing Towards Goal     Problem: Alcohol Withdrawal  Goal: *STG: Participates in treatment plan  Outcome: Progressing Towards Goal  Goal: *STG: Remains safe in hospital  Outcome: Progressing Towards Goal  Goal: *STG: Seeks staff when symptoms of withdrawal increase  Outcome: Progressing Towards Goal  Goal: *STG: Complies with medication therapy  Outcome: Progressing Towards Goal  Goal: *STG: Attends activities and groups  Outcome: Progressing Towards Goal  Goal: *STG: Will identify negative impact of chemical dependency including the use of tobacco, alcohol, and other substances  Outcome: Progressing Towards Goal  Goal: *STG: Verbalizes abstinence as an achievable goal  Outcome: Progressing Towards Goal  Goal: *STG: Agrees to participate in outpatient after care program to support ongoing mental health  Outcome: Progressing Towards Goal  Goal: *STG: Able to indentify relapse triggers including interpersonal/social and familial factors  Outcome: Progressing Towards Goal  Goal: *STG: Identify lifestyle changes to support long term sobriety such as vocation, employment, education, and legal issues  Outcome: Progressing Towards Goal  Goal: *STG: Maintains appropriate nutrition and hydration  Outcome: Progressing Towards Goal  Goal: *STG: Vital signs within defined limits  Outcome: Progressing Towards Goal  Goal: *STG/LTG: Relapse prevention plan in place to include housing/aftercare, leisure activities, and spirituality  Outcome: Progressing Towards Goal  Goal: Interventions  Outcome: Progressing Towards Goal     Problem: Patient Education: Go to Patient Education Activity  Goal: Patient/Family Education  Outcome: Progressing Towards Goal

## 2021-10-24 NOTE — PROGRESS NOTES
10/24/21 1348   Vitals   Temp 98.2 °F (36.8 °C)   Temp Source Oral   Pulse (Heart Rate) (!) 124   Heart Rate Source Monitor   Resp Rate 16   O2 Sat (%) 97 %   Level of Consciousness Alert (0)   BP (!) 136/90   MAP (Calculated) 105   MEWS Score 3   MEWS Score of 3. Patient has no complaints at this time. Scheduled PO ativan given. MD informed. Will continue to monitor.

## 2021-10-24 NOTE — PROGRESS NOTES
[facility] Adult  Hospitalist Group                                                                                          Hospitalist Progress Note  Bernard Watt MD  Answering service: [phone] OR [phone] from in house phone        Date of Service:  10/24/2021  NAME:  Tj Johnson  :  1950  MRN:  [id number]      Admission Summary:   71 yo male wit COPD, Asthma, Chronic alcohol use 2-4 shots Vodka daily, was diagnosed with Rt lower lobe subsegmental PE and started on Eliquis. Patient states he became sob and returned to [facility] but left AMA due to wait time. On 10/23 patient vomited large amounts of blood. [facility] [place] [facility] where he had additional bloody vomiting. Pt was transferred to [facility] for admission. Interval history / Subjective:     Pt seen and examined  States breathing has improved   No further vomiting noted      Assessment & Plan:     Acute blood loss anemia  - due to UGI bleed  - hx of alcoholism, suspect possible variceal bleed vs Rosana tear vs ulcer  - GI consulted, pending  - monitor H/h  - PPI bid   - clears for now, NPO at MN    Recent Dx of Rt Subsegmental PE  - repeat CTA chest NO PE  - Venous duplex no DVTs  - OAC not indicated for subsegmental PE.  With repeat testing showing no PE and on going UGI bleed, will not resume [address] [place] [address]    COPD Exacerbation   - on steroids, nebs, brovana/Pulmicort, Singulair   - improving    Chronic alcohol abuse with possible alcohol withdrawals  - significant tremors on exam   - add Ativan 0.5mg TID  - CIWA protocol  - monitor    Anxiety/depression  - c/w Zolfot       Code status: Full  DVT prophylaxis: SCDs due to GI bleed     Care Plan discussed with: Patient/Family and Nurse  Anticipated Disposition: Home w/Family  Anticipated Discharge: 24 hours to 48 hours     Hospital Problems  Never Reviewed        Codes Class Noted POA    Alcohol abuse ICD-10-CM: F10.10  ICD-9-CM: 305.00  10/23/2021 Unknown        Vomiting blood ICD-10-CM: K92.0  ICD-9-CM: 578.0  10/23/2021 Unknown                Review of Systems:   A comprehensive review of systems was negative except for that written in the HPI. Vital Signs:    Last 24hrs VS reviewed since prior progress note. Most recent are:  Visit Vitals  /82   Pulse 99   Temp 98.2 °F (36.8 °C)   Resp 14   Ht 5' 7\" (1.702 m)   Wt 76 kg (167 lb 9.6 oz)   SpO2 100%   BMI 26.25 kg/m²       No intake or output data in the 24 hours ending 10/24/21 1250     Physical Examination:     I had a face to face encounter with this patient and independently examined them on 10/24/2021 as outlined below:          Constitutional:  No acute distress, cooperative, pleasant    ENT:  Oral mucosa moist, oropharynx benign. Resp: Fair air entry bilaterally, no wheezing    CV:  Regular rhythm, normal rate, no murmurs, gallops, rubs    GI:  Soft, non distended, non tender. normoactive bowel sounds,      Musculoskeletal:  No edema, warm, 2+ pulses throughout    Neurologic:  Moves all extremities. AAOx3, CN II-XII reviewed, tremulous             Data Review:    Review and/or order of clinical lab test  Review and/or order of tests in the radiology section of CPT         Labs:     Recent Labs     10/24/21  0548 10/23/21  1237   WBC 4.8 7.3   HGB 10.3* 12.3   HCT 31.0* 37.2    234     Recent Labs     10/24/21  0548 10/23/21  1237    140   K 3.9 3.6   * 102   CO2 24 21   BUN 15 20   CREA 0.70 0.78   * 193*   CA 8.3* 8.7   MG 1.7  --    PHOS 2.3*  --      Recent Labs     10/24/21  0548 10/23/21  1237   ALT 49 76    139*   TBILI 0.9 0.7   TP 6.4 7.3   ALB 2.5* 3.2*   GLOB 3.9 4.1*     Recent Labs     10/23/21  1631   INR 1.3*   PTP 13.1*   APTT 23.3      No results for input(s): FE, TIBC, PSAT, FERR in the last 72 hours. No results found for: FOL, RBCF   No results for input(s): PH, PCO2, PO2 in the last 72 hours. No results for input(s): CPK, CKNDX, TROIQ in the last 72 hours.     No lab exists for component: CPKMB  No results found for: CHOL, CHOLX, CHLST, CHOLV, HDL, HDLP, LDL, LDLC, DLDLP, TGLX, TRIGL, TRIGP, CHHD, CHHDX  No results found for: GLUCPOC  No results found for: COLOR, APPRN, SPGRU, REFSG, RUSLAN, PROTU, GLUCU, KETU, BILU, UROU, ESTRELLA, LEUKU, GLUKE, EPSU, BACTU, WBCU, RBCU, CASTS, UCRY      Medications Reviewed:     Current Facility-Administered Medications   Medication Dose Route Frequency    0.9% sodium chloride infusion  100 mL/hr IntraVENous CONTINUOUS    pantoprazole (PROTONIX) 40 mg in 0.9% sodium chloride 10 mL injection  40 mg IntraVENous Q12H    albuterol-ipratropium (DUO-NEB) 2.5 MG-0.5 MG/3 ML  3 mL Nebulization Q4H RT    arformoteroL (BROVANA) neb solution 15 mcg  15 mcg Nebulization BID RT    And    budesonide (PULMICORT) 500 mcg/2 ml nebulizer suspension  500 mcg Nebulization BID RT    montelukast (SINGULAIR) tablet 10 mg  10 mg Oral QHS    sertraline (ZOLOFT) tablet 50 mg  50 mg Oral DAILY    methylPREDNISolone (PF) (SOLU-MEDROL) injection 20 mg  20 mg IntraVENous Q12H    LORazepam (ATIVAN) injection 2 mg  2 mg IntraVENous Q1H PRN    LORazepam (ATIVAN) injection 4 mg  4 mg IntraVENous Q1H PRN    0.9% sodium chloride 4,972 mL with folic acid 1 mg, thiamine 100 mg infusion   IntraVENous Q24H    sodium chloride (NS) flush 5-40 mL  5-40 mL IntraVENous Q8H    sodium chloride (NS) flush 5-40 mL  5-40 mL IntraVENous PRN    acetaminophen (TYLENOL) tablet 650 mg  650 mg Oral Q6H PRN    Or    acetaminophen (TYLENOL) suppository 650 mg  650 mg Rectal Q6H PRN    polyethylene glycol (MIRALAX) packet 17 g  17 g Oral DAILY PRN    ondansetron (ZOFRAN ODT) tablet 4 mg  4 mg Oral Q8H PRN    Or    ondansetron (ZOFRAN) injection 4 mg  4 mg IntraVENous Q6H PRN     ______________________________________________________________________  EXPECTED LENGTH OF STAY: - - -  ACTUAL LENGTH OF STAY:          1                 Renato MD Renaldo

## 2021-10-24 NOTE — PROGRESS NOTES
Bedside shift change report given to [address] (oncoming nurse) by Rocio Hickman RN (offgoing nurse). Report included the following information SBAR, Kardex, MAR, Med Rec Status, Alarm Parameters  and Quality Measures.

## 2021-10-25 ENCOUNTER — ANESTHESIA EVENT (OUTPATIENT)
Dept: ENDOSCOPY | Age: 71
DRG: 381 | End: 2021-10-25
Payer: MEDICARE

## 2021-10-25 ENCOUNTER — ANESTHESIA (OUTPATIENT)
Dept: ENDOSCOPY | Age: 71
DRG: 381 | End: 2021-10-25
Payer: MEDICARE

## 2021-10-25 LAB
ANION GAP SERPL CALC-SCNC: 6 MMOL/L (ref 5–15)
BASOPHILS # BLD: 0 K/UL (ref 0–0.1)
BASOPHILS NFR BLD: 0 % (ref 0–1)
BUN SERPL-MCNC: 9 MG/DL (ref 6–20)
BUN/CREAT SERPL: 13 (ref 12–20)
CALCIUM SERPL-MCNC: 7.9 MG/DL (ref 8.5–10.1)
CHLORIDE SERPL-SCNC: 109 MMOL/L (ref 97–108)
CO2 SERPL-SCNC: 23 MMOL/L (ref 21–32)
COVID-19 RAPID TEST, COVR: NOT DETECTED
CREAT SERPL-MCNC: 0.67 MG/DL (ref 0.7–1.3)
DIFFERENTIAL METHOD BLD: ABNORMAL
EOSINOPHIL # BLD: 0 K/UL (ref 0–0.4)
EOSINOPHIL NFR BLD: 0 % (ref 0–7)
ERYTHROCYTE [DISTWIDTH] IN BLOOD BY AUTOMATED COUNT: 14.7 % (ref 11.5–14.5)
GLUCOSE BLD STRIP.AUTO-MCNC: 152 MG/DL (ref 65–117)
GLUCOSE SERPL-MCNC: 172 MG/DL (ref 65–100)
HCT VFR BLD AUTO: 28.1 % (ref 36.6–50.3)
HGB BLD-MCNC: 9.1 G/DL (ref 12.1–17)
IMM GRANULOCYTES # BLD AUTO: 0 K/UL (ref 0–0.04)
IMM GRANULOCYTES NFR BLD AUTO: 1 % (ref 0–0.5)
LYMPHOCYTES # BLD: 0.5 K/UL (ref 0.8–3.5)
LYMPHOCYTES NFR BLD: 17 % (ref 12–49)
MAGNESIUM SERPL-MCNC: 1.6 MG/DL (ref 1.6–2.4)
MCH RBC QN AUTO: 31.5 PG (ref 26–34)
MCHC RBC AUTO-ENTMCNC: 32.4 G/DL (ref 30–36.5)
MCV RBC AUTO: 97.2 FL (ref 80–99)
MONOCYTES # BLD: 0.1 K/UL (ref 0–1)
MONOCYTES NFR BLD: 3 % (ref 5–13)
NEUTS SEG # BLD: 2.5 K/UL (ref 1.8–8)
NEUTS SEG NFR BLD: 79 % (ref 32–75)
NRBC # BLD: 0 K/UL (ref 0–0.01)
NRBC BLD-RTO: 0 PER 100 WBC
PLATELET # BLD AUTO: 144 K/UL (ref 150–400)
PMV BLD AUTO: 9.4 FL (ref 8.9–12.9)
POTASSIUM SERPL-SCNC: 3.5 MMOL/L (ref 3.5–5.1)
RBC # BLD AUTO: 2.89 M/UL (ref 4.1–5.7)
RBC MORPH BLD: ABNORMAL
RBC MORPH BLD: ABNORMAL
SARS-COV-2, COV2: NORMAL
SERVICE CMNT-IMP: ABNORMAL
SODIUM SERPL-SCNC: 138 MMOL/L (ref 136–145)
SOURCE, COVRS: NORMAL
WBC # BLD AUTO: 3.1 K/UL (ref 4.1–11.1)

## 2021-10-25 PROCEDURE — C9113 INJ PANTOPRAZOLE SODIUM, VIA: HCPCS | Performed by: HOSPITALIST

## 2021-10-25 PROCEDURE — 36415 COLL VENOUS BLD VENIPUNCTURE: CPT

## 2021-10-25 PROCEDURE — 76040000019: Performed by: INTERNAL MEDICINE

## 2021-10-25 PROCEDURE — 74011250637 HC RX REV CODE- 250/637: Performed by: HOSPITALIST

## 2021-10-25 PROCEDURE — 94640 AIRWAY INHALATION TREATMENT: CPT

## 2021-10-25 PROCEDURE — 74011000250 HC RX REV CODE- 250: Performed by: NURSE ANESTHETIST, CERTIFIED REGISTERED

## 2021-10-25 PROCEDURE — 74011250636 HC RX REV CODE- 250/636: Performed by: INTERNAL MEDICINE

## 2021-10-25 PROCEDURE — 74011250636 HC RX REV CODE- 250/636: Performed by: HOSPITALIST

## 2021-10-25 PROCEDURE — 94760 N-INVAS EAR/PLS OXIMETRY 1: CPT

## 2021-10-25 PROCEDURE — 82962 GLUCOSE BLOOD TEST: CPT

## 2021-10-25 PROCEDURE — 77030010936 HC CLP LIG BSC -C: Performed by: INTERNAL MEDICINE

## 2021-10-25 PROCEDURE — 74011250636 HC RX REV CODE- 250/636: Performed by: NURSE ANESTHETIST, CERTIFIED REGISTERED

## 2021-10-25 PROCEDURE — 74011636637 HC RX REV CODE- 636/637: Performed by: INTERNAL MEDICINE

## 2021-10-25 PROCEDURE — 76060000031 HC ANESTHESIA FIRST 0.5 HR: Performed by: INTERNAL MEDICINE

## 2021-10-25 PROCEDURE — 2709999900 HC NON-CHARGEABLE SUPPLY: Performed by: INTERNAL MEDICINE

## 2021-10-25 PROCEDURE — 80048 BASIC METABOLIC PNL TOTAL CA: CPT

## 2021-10-25 PROCEDURE — 85025 COMPLETE CBC W/AUTO DIFF WBC: CPT

## 2021-10-25 PROCEDURE — 74011000250 HC RX REV CODE- 250: Performed by: HOSPITALIST

## 2021-10-25 PROCEDURE — 83735 ASSAY OF MAGNESIUM: CPT

## 2021-10-25 PROCEDURE — 87635 SARS-COV-2 COVID-19 AMP PRB: CPT

## 2021-10-25 PROCEDURE — 65660000000 HC RM CCU STEPDOWN

## 2021-10-25 PROCEDURE — 0W3P8ZZ CONTROL BLEEDING IN GASTROINTESTINAL TRACT, VIA NATURAL OR ARTIFICIAL OPENING ENDOSCOPIC: ICD-10-PCS | Performed by: INTERNAL MEDICINE

## 2021-10-25 DEVICE — WORKING LENGTH 235CM, WORKING CHANNEL 2.8MM
Type: IMPLANTABLE DEVICE | Site: ESOPHAGUS | Status: FUNCTIONAL
Brand: RESOLUTION 360 CLIP

## 2021-10-25 RX ORDER — MIDAZOLAM HYDROCHLORIDE 1 MG/ML
.25-5 INJECTION, SOLUTION INTRAMUSCULAR; INTRAVENOUS
Status: DISCONTINUED | OUTPATIENT
Start: 2021-10-25 | End: 2021-10-25 | Stop reason: HOSPADM

## 2021-10-25 RX ORDER — SODIUM CHLORIDE 9 MG/ML
INJECTION, SOLUTION INTRAVENOUS
Status: DISCONTINUED | OUTPATIENT
Start: 2021-10-25 | End: 2021-10-25 | Stop reason: HOSPADM

## 2021-10-25 RX ORDER — PROPOFOL 10 MG/ML
INJECTION, EMULSION INTRAVENOUS AS NEEDED
Status: DISCONTINUED | OUTPATIENT
Start: 2021-10-25 | End: 2021-10-25 | Stop reason: HOSPADM

## 2021-10-25 RX ORDER — FLUMAZENIL 0.1 MG/ML
0.2 INJECTION INTRAVENOUS
Status: DISCONTINUED | OUTPATIENT
Start: 2021-10-25 | End: 2021-10-25 | Stop reason: HOSPADM

## 2021-10-25 RX ORDER — EPINEPHRINE 0.1 MG/ML
1 INJECTION INTRACARDIAC; INTRAVENOUS
Status: DISCONTINUED | OUTPATIENT
Start: 2021-10-25 | End: 2021-10-25 | Stop reason: HOSPADM

## 2021-10-25 RX ORDER — SODIUM CHLORIDE 9 MG/ML
50 INJECTION, SOLUTION INTRAVENOUS CONTINUOUS
Status: DISPENSED | OUTPATIENT
Start: 2021-10-25 | End: 2021-10-25

## 2021-10-25 RX ORDER — SODIUM CHLORIDE 0.9 % (FLUSH) 0.9 %
5-40 SYRINGE (ML) INJECTION AS NEEDED
Status: DISCONTINUED | OUTPATIENT
Start: 2021-10-25 | End: 2021-10-26 | Stop reason: HOSPADM

## 2021-10-25 RX ORDER — FENTANYL CITRATE 50 UG/ML
25-200 INJECTION, SOLUTION INTRAMUSCULAR; INTRAVENOUS
Status: DISCONTINUED | OUTPATIENT
Start: 2021-10-25 | End: 2021-10-25 | Stop reason: HOSPADM

## 2021-10-25 RX ORDER — LIDOCAINE HYDROCHLORIDE 20 MG/ML
INJECTION, SOLUTION EPIDURAL; INFILTRATION; INTRACAUDAL; PERINEURAL AS NEEDED
Status: DISCONTINUED | OUTPATIENT
Start: 2021-10-25 | End: 2021-10-25 | Stop reason: HOSPADM

## 2021-10-25 RX ORDER — MAGNESIUM SULFATE HEPTAHYDRATE 40 MG/ML
2 INJECTION, SOLUTION INTRAVENOUS ONCE
Status: COMPLETED | OUTPATIENT
Start: 2021-10-25 | End: 2021-10-25

## 2021-10-25 RX ORDER — IPRATROPIUM BROMIDE AND ALBUTEROL SULFATE 2.5; .5 MG/3ML; MG/3ML
3 SOLUTION RESPIRATORY (INHALATION)
Status: DISCONTINUED | OUTPATIENT
Start: 2021-10-25 | End: 2021-10-26 | Stop reason: HOSPADM

## 2021-10-25 RX ORDER — ATROPINE SULFATE 0.1 MG/ML
0.5 INJECTION INTRAVENOUS
Status: DISCONTINUED | OUTPATIENT
Start: 2021-10-25 | End: 2021-10-25 | Stop reason: HOSPADM

## 2021-10-25 RX ORDER — DEXTROMETHORPHAN/PSEUDOEPHED 2.5-7.5/.8
1.2 DROPS ORAL
Status: DISCONTINUED | OUTPATIENT
Start: 2021-10-25 | End: 2021-10-25 | Stop reason: HOSPADM

## 2021-10-25 RX ORDER — NALOXONE HYDROCHLORIDE 0.4 MG/ML
0.4 INJECTION, SOLUTION INTRAMUSCULAR; INTRAVENOUS; SUBCUTANEOUS
Status: DISCONTINUED | OUTPATIENT
Start: 2021-10-25 | End: 2021-10-25 | Stop reason: HOSPADM

## 2021-10-25 RX ORDER — SODIUM CHLORIDE 0.9 % (FLUSH) 0.9 %
5-40 SYRINGE (ML) INJECTION EVERY 8 HOURS
Status: DISCONTINUED | OUTPATIENT
Start: 2021-10-25 | End: 2021-10-26 | Stop reason: HOSPADM

## 2021-10-25 RX ADMIN — SODIUM CHLORIDE 50 ML/HR: 9 INJECTION, SOLUTION INTRAVENOUS at 16:28

## 2021-10-25 RX ADMIN — FOLIC ACID: 5 INJECTION, SOLUTION INTRAMUSCULAR; INTRAVENOUS; SUBCUTANEOUS at 22:13

## 2021-10-25 RX ADMIN — SODIUM CHLORIDE 40 MG: 9 INJECTION INTRAMUSCULAR; INTRAVENOUS; SUBCUTANEOUS at 22:13

## 2021-10-25 RX ADMIN — PROPOFOL 80 MG: 10 INJECTION, EMULSION INTRAVENOUS at 14:22

## 2021-10-25 RX ADMIN — SODIUM CHLORIDE: 900 INJECTION, SOLUTION INTRAVENOUS at 14:18

## 2021-10-25 RX ADMIN — SODIUM CHLORIDE 100 ML/HR: 9 INJECTION, SOLUTION INTRAVENOUS at 06:47

## 2021-10-25 RX ADMIN — MONTELUKAST 10 MG: 10 TABLET, FILM COATED ORAL at 22:13

## 2021-10-25 RX ADMIN — LORAZEPAM 0.5 MG: 0.5 TABLET ORAL at 22:13

## 2021-10-25 RX ADMIN — BUDESONIDE 500 MCG: 0.5 INHALANT RESPIRATORY (INHALATION) at 19:38

## 2021-10-25 RX ADMIN — MAGNESIUM SULFATE HEPTAHYDRATE 2 G: 40 INJECTION, SOLUTION INTRAVENOUS at 09:37

## 2021-10-25 RX ADMIN — PROPOFOL 30 MG: 10 INJECTION, EMULSION INTRAVENOUS at 14:23

## 2021-10-25 RX ADMIN — METHYLPREDNISOLONE SODIUM SUCCINATE 20 MG: 40 INJECTION, POWDER, FOR SOLUTION INTRAMUSCULAR; INTRAVENOUS at 09:37

## 2021-10-25 RX ADMIN — IPRATROPIUM BROMIDE AND ALBUTEROL SULFATE 3 ML: .5; 3 SOLUTION RESPIRATORY (INHALATION) at 08:00

## 2021-10-25 RX ADMIN — SODIUM CHLORIDE 40 MG: 9 INJECTION INTRAMUSCULAR; INTRAVENOUS; SUBCUTANEOUS at 09:37

## 2021-10-25 RX ADMIN — LIDOCAINE HYDROCHLORIDE 40 MG: 20 INJECTION, SOLUTION EPIDURAL; INFILTRATION; INTRACAUDAL; PERINEURAL at 14:22

## 2021-10-25 RX ADMIN — PROPOFOL 40 MG: 10 INJECTION, EMULSION INTRAVENOUS at 14:28

## 2021-10-25 RX ADMIN — IPRATROPIUM BROMIDE AND ALBUTEROL SULFATE 3 ML: .5; 3 SOLUTION RESPIRATORY (INHALATION) at 19:38

## 2021-10-25 RX ADMIN — Medication 10 ML: at 22:13

## 2021-10-25 RX ADMIN — BUDESONIDE 500 MCG: 0.5 INHALANT RESPIRATORY (INHALATION) at 08:00

## 2021-10-25 RX ADMIN — ARFORMOTEROL TARTRATE 15 MCG: 15 SOLUTION RESPIRATORY (INHALATION) at 19:38

## 2021-10-25 RX ADMIN — LORAZEPAM 0.5 MG: 0.5 TABLET ORAL at 09:37

## 2021-10-25 RX ADMIN — OCTREOTIDE ACETATE 50 MCG/HR: 500 INJECTION, SOLUTION INTRAVENOUS; SUBCUTANEOUS at 09:31

## 2021-10-25 RX ADMIN — ONDANSETRON 4 MG: 2 INJECTION INTRAMUSCULAR; INTRAVENOUS at 15:06

## 2021-10-25 RX ADMIN — SERTRALINE 50 MG: 50 TABLET, FILM COATED ORAL at 09:37

## 2021-10-25 RX ADMIN — LORAZEPAM 0.5 MG: 0.5 TABLET ORAL at 16:27

## 2021-10-25 RX ADMIN — METHYLPREDNISOLONE SODIUM SUCCINATE 20 MG: 40 INJECTION, POWDER, FOR SOLUTION INTRAMUSCULAR; INTRAVENOUS at 22:12

## 2021-10-25 RX ADMIN — Medication 10 ML: at 16:27

## 2021-10-25 NOTE — PROCEDURES
[facility], [address] [place] [address] (EGD) Procedure Note    [facility]  1950  [id number]      Procedure: Endoscopic Gastroduodenoscopy with control of bleeding    Indication:  Hematemesis     Pre-operative Diagnosis: see indication above    Post-operative Diagnosis: see findings below    : Miranda Leos MD    Surgical Assistant: Endoscopy Technician-1: Josefa Arroyo  Endoscopy RN-1: Cristina Garcia RN    Implants:  None    Referring Provider:  Myke Gregory MD      Anesthesia/Sedation:  MAC anesthesia Propofol        Procedure Details     After infomed consent was obtained for the procedure, with all risks and benefits of procedure explained the patient was taken to the endoscopy suite and placed in the left lateral decubitus position. Following sequential administration of sedation as per above, the endoscope was inserted into the mouth and advanced under direct vision to third portion of the duodenum. A careful inspection was made as the gastroscope was withdrawn, including a retroflexed view of the proximal stomach; findings and interventions are described below. Findings:   Esophagus:hiatal hernia 5 cm in size     There was severe esophagitis at G-E junction  No varices seen  There were 2 esophageal ulcers in distal esophagus around 1 cm in size each, with visible vessels and clots covering them, I then placed 2 hemoclips ( resolution clips , BS) respectively on both ulcers     Stomach: normal   Duodenum/jejunum: normal      Therapies:  As above    Specimens: none         EBL: None      Complications:   None; patient tolerated the procedure well. Impression:    -See post-procedure diagnoses.     Recommendations:  -Continue acid suppression with PPI  -I d/c octreotide gtt  -full liquid diet  -repeat EGD in 2 to 3 months to assess healing of esophagitis   -will follow  -avoid eliquis , minimum  1 week if possible    Signed By: Adeline Gomez MD     10/25/2021  2:37 PM

## 2021-10-25 NOTE — ANESTHESIA POSTPROCEDURE EVALUATION
Post-Anesthesia Evaluation and Assessment    Patient: Moose Tena MRN: [id number]  SSN: [id number]    YOB: 1950  Age: 70 y.o. Sex: male      I have evaluated the patient and they are stable and ready for discharge from the PACU. Cardiovascular Function/Vital Signs  Visit Vitals  /74   Pulse 79   Temp 36.7 °C (98 °F)   Resp (!) 33   Ht 5' 7\" (1.702 m)   Wt 76 kg (167 lb 9.6 oz)   SpO2 98%   BMI 26.25 kg/m²       Patient is status post MAC anesthesia for Procedure(s) with comments:  ESOPHAGOGASTRODUODENOSCOPY (EGD)  RESOLUTION CLIP - 2 Placed  2 Wasted. Nausea/Vomiting: None    Postoperative hydration reviewed and adequate. Pain:  Pain Scale 1: Numeric (0 - 10) (10/25/21 0612)  Pain Intensity 1: 0 (10/25/21 0612)   Managed    Neurological Status:   Neuro  Neurologic State: Alert (10/24/21 0800)  Orientation Level: Oriented X4 (10/24/21 0800)  Cognition: Appropriate decision making; Appropriate for age attention/concentration; Appropriate safety awareness; Follows commands (10/24/21 0800)  Speech: Clear (10/24/21 0800)  LUE Motor Response: Purposeful;Tremorous (10/24/21 0800)  LLE Motor Response: Purposeful (10/24/21 0800)  RUE Motor Response: Purposeful;Tremorous (10/24/21 0800)  RLE Motor Response: Purposeful (10/24/21 0800)   At baseline    Mental Status, Level of Consciousness: Alert and  oriented to person, place, and time    Pulmonary Status:   O2 Device: Nasal cannula (10/25/21 1434)   Adequate oxygenation and airway patent    Complications related to anesthesia: None    Post-anesthesia assessment completed. No concerns    Signed By: Jose Marshall MD     October 25, 2021              Procedure(s):  ESOPHAGOGASTRODUODENOSCOPY (EGD)  RESOLUTION CLIP. MAC    <BSHSIANPOST>    INITIAL Post-op Vital signs:   Vitals Value Taken Time   BP     Temp     Pulse 85 10/25/21 1440   Resp 17 10/25/21 1440   SpO2 98 % 10/25/21 1440   Vitals shown include unvalidated device data.

## 2021-10-25 NOTE — PERIOP NOTES
TRANSFER - OUT REPORT:    Verbal report given to [address] RN(name) on Jacinto Doe  being transferred to [facility](unit) for routine post - op       Report consisted of patients Situation, Background, Assessment and   Recommendations(SBAR). Information from the following report(s) SBAR and OR Summary was reviewed with the receiving nurse. Lines:   Peripheral IV 10/23/21 Left Antecubital (Active)   Site Assessment Clean, dry, & intact 10/24/21 2000   Phlebitis Assessment 0 10/24/21 2000   Infiltration Assessment 0 10/24/21 2000   Dressing Status Clean, dry, & intact 10/24/21 2000   Dressing Type Tape;Transparent 10/24/21 2000   Hub Color/Line Status Pink 10/24/21 2000   Action Taken Open ports on tubing capped 10/24/21 2000   Alcohol Cap Used Yes 10/24/21 2000        Opportunity for questions and clarification was provided.

## 2021-10-25 NOTE — PROGRESS NOTES
Reason for Admission:  Alcohol abuse, vomiting blood. RUR Score:   9%                  Plan for utilizing home health:    TBD      PCP: First and Last name:  Karon Arevalo MD     Name of Practice:    Are you a current patient: Yes/No:    Approximate date of last visit:    Can you participate in a virtual visit with your PCP:                     Current Advanced Directive/Advance Care Plan: Full Code      Healthcare Decision Maker:   Click here to complete [address] including selection of the Healthcare Decision Maker Relationship (ie \"Primary\")                             Transition of Care Plan:  CM met with pt to introduce him to the role of CM and transition of care. This pt lives in a one story home with his wife. He stated that he has a walker and occasionally uses it. Pt was independent with his ADL's and IADL's prior to admission. CM asked pt if he wants resources related to abstinence from alcohol and he does not. CM will follow for d/c needs. [facility] Management Interventions  PCP Verified by CM:  Yes  Palliative Care Criteria Met (RRAT>21 & CHF Dx)?: No  Transition of Care Consult (CM Consult): Discharge Planning  MyChart Signup: No  Discharge Durable Medical Equipment: No  Physical Therapy Consult: No  Occupational Therapy Consult: No  Speech Therapy Consult: No  Support Systems: Spouse/Significant Other  Confirm Follow Up Transport: Family  The Plan for Transition of Care is Related to the Following Treatment Goals : safe d/c  The Patient and/or Patient Representative was Provided with a Choice of Provider and Agrees with the Discharge Plan?: Yes  Freedom of Choice List was Provided with Basic Dialogue that Supports the Patient's Individualized Plan of Care/Goals, Treatment Preferences and Shares the Quality Data Associated with the Providers?: Yes  The [employer] Information Provided?: No

## 2021-10-25 NOTE — PERIOP NOTES

## 2021-10-25 NOTE — PROGRESS NOTES
[address]  [facility], [address]       GI PROGRESS NOTE  Will Maninder Maldonado  [phone] office  [phone] NP/PA in-hospital cell phone M-F until 4:30PM  After 5PM or on weekends, please call  for physician on call      NAME: Nydia Hines   :  1950   MRN:  [id number]       Subjective:   No nausea, vomiting, or abdominal pain. No bowel movement. Patient is NPO. Objective:     VITALS:   Last 24hrs VS reviewed since prior progress note. Most recent are:  Visit Vitals  /80 (BP 1 Location: Right upper arm)   Pulse 94   Temp 98.2 °F (36.8 °C)   Resp 17   Ht 5' 7\" (1.702 m)   Wt 76 kg (167 lb 9.6 oz)   SpO2 98%   BMI 26.25 kg/m²       PHYSICAL EXAM:  General: Cooperative, no acute distress    Neurologic:  Alert and oriented  HEENT: EOMI, no scleral icterus   Lungs:  No acute respiratory distress  Heart:  S1 S2  Abdomen: Soft, non-distended, no tenderness, no guarding, no rebound. +Bowel sounds. Extremities: Warm  Psych:   Not anxious or agitated      Lab Data Reviewed:     Recent Results (from the past 24 hour(s))   CBC WITH AUTOMATED DIFF    Collection Time: 10/25/21  1:47 AM   Result Value Ref Range    WBC 3.1 (L) 4.1 - 11.1 K/uL    RBC 2.89 (L) 4.10 - 5.70 M/uL    HGB 9.1 (L) 12.1 - 17.0 g/dL    HCT 28.1 (L) 36.6 - 50.3 %    MCV 97.2 80.0 - 99.0 FL    MCH 31.5 26.0 - 34.0 PG    MCHC 32.4 30.0 - 36.5 g/dL    RDW 14.7 (H) 11.5 - 14.5 %    PLATELET 888 (L) 390 - 400 K/uL    MPV 9.4 8.9 - 12.9 FL    NRBC 0.0 0  WBC    ABSOLUTE NRBC 0.00 0.00 - 0.01 K/uL    NEUTROPHILS 79 (H) 32 - 75 %    LYMPHOCYTES 17 12 - 49 %    MONOCYTES 3 (L) 5 - 13 %    EOSINOPHILS 0 0 - 7 %    BASOPHILS 0 0 - 1 %    IMMATURE GRANULOCYTES 1 (H) 0.0 - 0.5 %    ABS. NEUTROPHILS 2.5 1.8 - 8.0 K/UL    ABS. LYMPHOCYTES 0.5 (L) 0.8 - 3.5 K/UL    ABS. MONOCYTES 0.1 0.0 - 1.0 K/UL    ABS. EOSINOPHILS 0.0 0.0 - 0.4 K/UL    ABS. BASOPHILS 0.0 0.0 - 0.1 K/UL    ABS. IMM.  GRANS. 0.0 0.00 - 0.04 K/UL    DF MANUAL      RBC COMMENTS MACROCYTOSIS  1+        RBC COMMENTS OVALOCYTES  PRESENT       METABOLIC PANEL, BASIC    Collection Time: 10/25/21  1:47 AM   Result Value Ref Range    Sodium 138 136 - 145 mmol/L    Potassium 3.5 3.5 - 5.1 mmol/L    Chloride 109 (H) 97 - 108 mmol/L    CO2 23 21 - 32 mmol/L    Anion gap 6 5 - 15 mmol/L    Glucose 172 (H) 65 - 100 mg/dL    BUN 9 6 - 20 MG/DL    Creatinine 0.67 (L) 0.70 - 1.30 MG/DL    BUN/Creatinine ratio 13 12 - 20      GFR est AA >60 >60 ml/min/1.73m2    GFR est non-AA >60 >60 ml/min/1.73m2    Calcium 7.9 (L) 8.5 - 10.1 MG/DL   MAGNESIUM    Collection Time: 10/25/21  1:47 AM   Result Value Ref Range    Magnesium 1.6 1.6 - 2.4 mg/dL   GLUCOSE, POC    Collection Time: 10/25/21  6:42 AM   Result Value Ref Range    Glucose (POC) 152 (H) 65 - 117 mg/dL    Performed by [place]    SARS-COV-2    Collection Time: 10/25/21 11:26 AM   Result Value Ref Range    SARS-CoV-2 Please find results under separate order          Assessment:   · Hematemesis: on Eliquis prior to admission. Hgb 9.1<--10.3<--12.3, platelets 711, INR 1.3. Differential includes Rosana-Chong tear, esophageal varices, gastritis, and peptic ulcer disease. · Elevated LFTs, normalized  · Alcohol abuse     Patient Active Problem List   Diagnosis Code    Alcohol abuse F10.10    Vomiting blood K92.0     Plan:   · NPO  · PPI BID  · On octreotide gtt  · Trend CBC and transfuse as necessary  · Plan for EGD today with Dr. Jeniffer Jaramillo. Details and risks of the procedure to include (but not limited to) anesthesia, bleeding, infection, and perforation were discussed. Patient understands and is in agreement with the plan. Negative rapid COVID test 10/25.       Signed By: HELEN Islas     10/25/2021  12:18 PM     Agree with above  EGD today

## 2021-10-25 NOTE — PROGRESS NOTES
[address] [facility] Adult  Hospitalist Group                                                                                          Hospitalist Progress Note  Shelley Blank MD  Answering service: [phone] from in house phone        Date of Service:  10/25/2021  NAME:  Aleshia Foley  :  1950  MRN:  [id number]      Admission Summary:   71 yo male wit COPD, Asthma, Chronic alcohol use 2-4 shots Vodka daily, was diagnosed with Rt lower lobe subsegmental PE and started on Eliquis. Patient states he became sob and returned to [facility] but left AMA due to wait time. On 10/23 patient vomited large amounts of blood. [facility] [place] [facility] where he had additional bloody vomiting. Pt was transferred to [facility] for admission. Interval history / Subjective:     Pt seen and examined  Awake and alert  Doing well   No further hematemesis   no BM        Assessment & Plan:     Acute blood loss anemia  - due to UGI bleed  - hx of alcoholism, suspect possible variceal bleed vs Rosana-Chong tear vs ulcer  - GI consulted,  - hb down to 9.1  - PPI bid   - EGD today, NPO     Recent Dx of Rt Subsegmental PE  - repeat CTA chest NO PE  - Venous duplex no DVTs  - OAC not indicated for subsegmental PE.  With repeat testing showing no PE and on going UGI bleed, will not resume [facility]    COPD Exacerbation   - on steroids, nebs, brovana/Pulmicort, Singulair   - improving    Chronic alcohol abuse with possible alcohol withdrawals  - significant tremors on exam   - add Ativan 0.5mg TID  - CIWA protocol  - monitor    Anxiety/depression  - c/w Zolfot     Hypomagnesemia   - replaced        Code status: Full  DVT prophylaxis: SCDs due to GI bleed     Care Plan discussed with: Patient/Family and Nurse  Anticipated Disposition: Home w/Family  Anticipated Discharge: 24 hours to 48 hours     Hospital Problems  Never Reviewed        Codes Class Noted POA    Alcohol abuse ICD-10-CM: F10.10  ICD-9-CM: 305.00  10/23/2021 Unknown Vomiting blood ICD-10-CM: K92.0  ICD-9-CM: 578.0  10/23/2021 Unknown                Review of Systems:   A comprehensive review of systems was negative except for that written in the HPI. Vital Signs:    Last 24hrs VS reviewed since prior progress note. Most recent are:  Visit Vitals  /80 (BP 1 Location: Right upper arm)   Pulse 94   Temp 98.2 °F (36.8 °C)   Resp 17   Ht 5' 7\" (1.702 m)   Wt 76 kg (167 lb 9.6 oz)   SpO2 98%   BMI 26.25 kg/m²       No intake or output data in the 24 hours ending 10/25/21 1114     Physical Examination:     I had a face to face encounter with this patient and independently examined them on 10/25/2021 as outlined below:          Constitutional:  No acute distress, cooperative, pleasant    ENT:  Oral mucosa moist, oropharynx benign. Resp: Fair air entry bilaterally, no wheezing    CV:  Regular rhythm, normal rate, no murmurs, gallops, rubs    GI:  Soft, non distended, non tender. normoactive bowel sounds,      Musculoskeletal:  No edema, warm, 2+ pulses throughout    Neurologic:  Moves all extremities. AAOx3, CN II-XII reviewed, tremulous             Data Review:    Review and/or order of clinical lab test  Review and/or order of tests in the radiology section of CPT         Labs:     Recent Labs     10/25/21  0147 10/24/21  0548   WBC 3.1* 4.8   HGB 9.1* 10.3*   HCT 28.1* 31.0*   * 167     Recent Labs     10/25/21  0147 10/24/21  0548 10/23/21  1237    137 140   K 3.5 3.9 3.6   * 110* 102   CO2 23 24 21   BUN 9 15 20   CREA 0.67* 0.70 0.78   * 147* 193*   CA 7.9* 8.3* 8.7   MG 1.6 1.7  --    PHOS  --  2.3*  --      Recent Labs     10/24/21  0548 10/23/21  1237   ALT 49 76    139*   TBILI 0.9 0.7   TP 6.4 7.3   ALB 2.5* 3.2*   GLOB 3.9 4.1*     Recent Labs     10/23/21  1631   INR 1.3*   PTP 13.1*   APTT 23.3      No results for input(s): FE, TIBC, PSAT, FERR in the last 72 hours.    No results found for: FOL, RBCF   No results for input(s): PH, PCO2, PO2 in the last 72 hours. No results for input(s): CPK, CKNDX, TROIQ in the last 72 hours.     No lab exists for component: CPKMB  No results found for: CHOL, CHOLX, CHLST, CHOLV, HDL, HDLP, LDL, LDLC, DLDLP, TGLX, TRIGL, TRIGP, CHHD, CHHDX  Lab Results   Component Value Date/Time    Glucose (POC) 152 (H) 10/25/2021 06:42 AM     No results found for: COLOR, APPRN, SPGRU, REFSG, RUSLAN, PROTU, GLUCU, KETU, BILU, UROU, ESTRELLA, LEUKU, GLUKE, EPSU, BACTU, WBCU, RBCU, CASTS, UCRY      Medications Reviewed:     Current Facility-Administered Medications   Medication Dose Route Frequency    magnesium sulfate 2 g/50 ml IVPB (premix or compounded)  2 g IntraVENous ONCE    LORazepam (ATIVAN) tablet 0.5 mg  0.5 mg Oral TID    octreotide (SANDOSTATIN) 500 mcg in 0.9% sodium chloride 500 mL infusion  50 mcg/hr IntraVENous CONTINUOUS    albuterol-ipratropium (DUO-NEB) 2.5 MG-0.5 MG/3 ML  3 mL Nebulization TID RT    0.9% sodium chloride infusion  100 mL/hr IntraVENous CONTINUOUS    pantoprazole (PROTONIX) 40 mg in 0.9% sodium chloride 10 mL injection  40 mg IntraVENous Q12H    arformoteroL (BROVANA) neb solution 15 mcg  15 mcg Nebulization BID RT    And    budesonide (PULMICORT) 500 mcg/2 ml nebulizer suspension  500 mcg Nebulization BID RT    montelukast (SINGULAIR) tablet 10 mg  10 mg Oral QHS    sertraline (ZOLOFT) tablet 50 mg  50 mg Oral DAILY    methylPREDNISolone (PF) (SOLU-MEDROL) injection 20 mg  20 mg IntraVENous Q12H    LORazepam (ATIVAN) injection 2 mg  2 mg IntraVENous Q1H PRN    LORazepam (ATIVAN) injection 4 mg  4 mg IntraVENous Q1H PRN    0.9% sodium chloride 4,120 mL with folic acid 1 mg, thiamine 100 mg infusion   IntraVENous Q24H    sodium chloride (NS) flush 5-40 mL  5-40 mL IntraVENous Q8H    sodium chloride (NS) flush 5-40 mL  5-40 mL IntraVENous PRN    acetaminophen (TYLENOL) tablet 650 mg  650 mg Oral Q6H PRN    Or    acetaminophen (TYLENOL) suppository 650 mg  650 mg Rectal Q6H PRN    polyethylene glycol (MIRALAX) packet 17 g  17 g Oral DAILY PRN    ondansetron (ZOFRAN ODT) tablet 4 mg  4 mg Oral Q8H PRN    Or    ondansetron (ZOFRAN) injection 4 mg  4 mg IntraVENous Q6H PRN     ______________________________________________________________________  EXPECTED LENGTH OF STAY: - - -  ACTUAL LENGTH OF STAY:          2                 Arcadio Villanueva MD

## 2021-10-25 NOTE — PERIOP NOTES
TRANSFER - IN REPORT:    Verbal report received from [address] RN(name) on Lolita Rodríguez  being received from 625(unit) for ordered procedure      Report consisted of patients Situation, Background, Assessment and   Recommendations(SBAR). Information from the following report(s) SBAR, Pre Procedure Checklist, Procedure Verification and Quality Measures was reviewed with the receiving nurse. Opportunity for questions and clarification was provided. Assessment completed upon patients arrival to unit and care assumed.

## 2021-10-25 NOTE — ANESTHESIA PREPROCEDURE EVALUATION
Relevant Problems   NEUROLOGY   (+) Alcohol abuse       Anesthetic History   No history of anesthetic complications            Review of Systems / Medical History  Patient summary reviewed, nursing notes reviewed and pertinent labs reviewed    Pulmonary    COPD               Neuro/Psych   Within defined limits           Cardiovascular  Within defined limits                     GI/Hepatic/Renal  Within defined limits              Endo/Other  Within defined limits           Other Findings   Comments: EtOH abuse           Physical Exam    Airway  Mallampati: II  TM Distance: > 6 cm  Neck ROM: normal range of motion   Mouth opening: Normal     Cardiovascular  Regular rate and rhythm,  S1 and S2 normal,  no murmur, click, rub, or gallop             Dental  No notable dental hx       Pulmonary  Breath sounds clear to auscultation               Abdominal  GI exam deferred       Other Findings            Anesthetic Plan    ASA: 3  Anesthesia type: MAC            Anesthetic plan and risks discussed with: Patient

## 2021-10-26 VITALS
HEIGHT: 67 IN | HEART RATE: 86 BPM | BODY MASS INDEX: 26.3 KG/M2 | DIASTOLIC BLOOD PRESSURE: 102 MMHG | WEIGHT: 167.6 LBS | RESPIRATION RATE: 22 BRPM | SYSTOLIC BLOOD PRESSURE: 150 MMHG | TEMPERATURE: 97.6 F | OXYGEN SATURATION: 98 %

## 2021-10-26 LAB
ANION GAP SERPL CALC-SCNC: 6 MMOL/L (ref 5–15)
BASOPHILS # BLD: 0 K/UL (ref 0–0.1)
BASOPHILS NFR BLD: 0 % (ref 0–1)
BUN SERPL-MCNC: 5 MG/DL (ref 6–20)
BUN/CREAT SERPL: 8 (ref 12–20)
CALCIUM SERPL-MCNC: 8 MG/DL (ref 8.5–10.1)
CHLORIDE SERPL-SCNC: 110 MMOL/L (ref 97–108)
CO2 SERPL-SCNC: 23 MMOL/L (ref 21–32)
CREAT SERPL-MCNC: 0.65 MG/DL (ref 0.7–1.3)
DIFFERENTIAL METHOD BLD: ABNORMAL
EOSINOPHIL # BLD: 0 K/UL (ref 0–0.4)
EOSINOPHIL NFR BLD: 0 % (ref 0–7)
ERYTHROCYTE [DISTWIDTH] IN BLOOD BY AUTOMATED COUNT: 14.7 % (ref 11.5–14.5)
GLUCOSE BLD STRIP.AUTO-MCNC: 185 MG/DL (ref 65–117)
GLUCOSE SERPL-MCNC: 140 MG/DL (ref 65–100)
HCT VFR BLD AUTO: 27.9 % (ref 36.6–50.3)
HGB BLD-MCNC: 9.2 G/DL (ref 12.1–17)
IMM GRANULOCYTES # BLD AUTO: 0 K/UL (ref 0–0.04)
IMM GRANULOCYTES NFR BLD AUTO: 1 % (ref 0–0.5)
LYMPHOCYTES # BLD: 0.5 K/UL (ref 0.8–3.5)
LYMPHOCYTES NFR BLD: 16 % (ref 12–49)
MCH RBC QN AUTO: 32.3 PG (ref 26–34)
MCHC RBC AUTO-ENTMCNC: 33 G/DL (ref 30–36.5)
MCV RBC AUTO: 97.9 FL (ref 80–99)
MONOCYTES # BLD: 0.1 K/UL (ref 0–1)
MONOCYTES NFR BLD: 3 % (ref 5–13)
NEUTS SEG # BLD: 2.4 K/UL (ref 1.8–8)
NEUTS SEG NFR BLD: 80 % (ref 32–75)
NRBC # BLD: 0 K/UL (ref 0–0.01)
NRBC BLD-RTO: 0 PER 100 WBC
PLATELET # BLD AUTO: 155 K/UL (ref 150–400)
PMV BLD AUTO: 9.5 FL (ref 8.9–12.9)
POTASSIUM SERPL-SCNC: 3.9 MMOL/L (ref 3.5–5.1)
RBC # BLD AUTO: 2.85 M/UL (ref 4.1–5.7)
RBC MORPH BLD: ABNORMAL
SERVICE CMNT-IMP: ABNORMAL
SODIUM SERPL-SCNC: 139 MMOL/L (ref 136–145)
WBC # BLD AUTO: 3 K/UL (ref 4.1–11.1)

## 2021-10-26 PROCEDURE — C9113 INJ PANTOPRAZOLE SODIUM, VIA: HCPCS | Performed by: HOSPITALIST

## 2021-10-26 PROCEDURE — 80048 BASIC METABOLIC PNL TOTAL CA: CPT

## 2021-10-26 PROCEDURE — 74011250637 HC RX REV CODE- 250/637: Performed by: HOSPITALIST

## 2021-10-26 PROCEDURE — 74011250636 HC RX REV CODE- 250/636: Performed by: HOSPITALIST

## 2021-10-26 PROCEDURE — 85025 COMPLETE CBC W/AUTO DIFF WBC: CPT

## 2021-10-26 PROCEDURE — 94640 AIRWAY INHALATION TREATMENT: CPT

## 2021-10-26 PROCEDURE — 82962 GLUCOSE BLOOD TEST: CPT

## 2021-10-26 PROCEDURE — 74011000250 HC RX REV CODE- 250: Performed by: HOSPITALIST

## 2021-10-26 PROCEDURE — 36415 COLL VENOUS BLD VENIPUNCTURE: CPT

## 2021-10-26 RX ORDER — LORAZEPAM 0.5 MG/1
TABLET ORAL
Qty: 6 TABLET | Refills: 0 | Status: SHIPPED | OUTPATIENT
Start: 2021-10-26

## 2021-10-26 RX ORDER — SUCRALFATE 1 G/1
1 TABLET ORAL 4 TIMES DAILY
Qty: 56 TABLET | Refills: 0 | Status: SHIPPED | OUTPATIENT
Start: 2021-10-26 | End: 2021-11-19

## 2021-10-26 RX ORDER — SUCRALFATE 1 G/1
1 TABLET ORAL 4 TIMES DAILY
Status: ON HOLD | COMMUNITY
End: 2021-10-26 | Stop reason: SDUPTHER

## 2021-10-26 RX ORDER — CHOLECALCIFEROL (VITAMIN D3) 125 MCG
20 CAPSULE ORAL
Status: ON HOLD | COMMUNITY
End: 2021-10-26

## 2021-10-26 RX ORDER — PANTOPRAZOLE SODIUM 40 MG/1
40 TABLET, DELAYED RELEASE ORAL 2 TIMES DAILY
Qty: 60 TABLET | Refills: 2 | Status: SHIPPED | OUTPATIENT
Start: 2021-10-26

## 2021-10-26 RX ADMIN — SERTRALINE 50 MG: 50 TABLET, FILM COATED ORAL at 08:17

## 2021-10-26 RX ADMIN — SODIUM CHLORIDE 40 MG: 9 INJECTION INTRAMUSCULAR; INTRAVENOUS; SUBCUTANEOUS at 08:17

## 2021-10-26 RX ADMIN — ARFORMOTEROL TARTRATE 15 MCG: 15 SOLUTION RESPIRATORY (INHALATION) at 07:31

## 2021-10-26 RX ADMIN — BUDESONIDE 500 MCG: 0.5 INHALANT RESPIRATORY (INHALATION) at 07:31

## 2021-10-26 RX ADMIN — LORAZEPAM 0.5 MG: 0.5 TABLET ORAL at 08:17

## 2021-10-26 RX ADMIN — METHYLPREDNISOLONE SODIUM SUCCINATE 20 MG: 40 INJECTION, POWDER, FOR SOLUTION INTRAMUSCULAR; INTRAVENOUS at 08:17

## 2021-10-26 RX ADMIN — SODIUM CHLORIDE 100 ML/HR: 9 INJECTION, SOLUTION INTRAVENOUS at 04:59

## 2021-10-26 RX ADMIN — IPRATROPIUM BROMIDE AND ALBUTEROL SULFATE 3 ML: .5; 3 SOLUTION RESPIRATORY (INHALATION) at 07:31

## 2021-10-26 NOTE — DISCHARGE INSTRUCTIONS
Please bring this form with you to show your primary care provider at your follow-up appointment. Primary care provider:  Dr. Andrey Santiago MD    Discharging provider:  Renato Macario MD    You have been admitted to the hospital with the following diagnoses:  · Vomiting blood [K92.0]  · Alcohol abuse [F10.10]    FOLLOW-UP CARE RECOMMENDATIONS:    APPOINTMENTS:  Follow-up Information     Follow up With Specialties Details Why Contact Info    Andrey Santiago MD Family Medicine In 1 week Discharge follow up  [address] [facility] 5  [phone]      Shirley Rankin MD Gastroenterology In 2 months schedule repeat EGD  [place] [address]  [phone]            FOLLOW-UP TESTS recommended:   - repeat EGD in 2-3 months    PENDING TEST RESULTS:  At the time of your discharge the following test results are still pending: none  Please make sure you review these results with your outpatient follow-up provider(s). SYMPTOMS to watch for: chest pain, shortness of breath, fever, chills, nausea, vomiting, diarrhea, change in mentation, falling, weakness, bleeding. DIET/what to eat: Soft diet, advance as tolerated in 3-4 days     ACTIVITY:  Activity as tolerated    WOUND CARE: NONE    EQUIPMENT needed:  NONE      What to do if new or unexpected symptoms occur? If you experience any of the above symptoms (or should other concerns or questions arise after discharge) please call your primary care physician. Return to the emergency room if you cannot get hold of your doctor. · It is very important that you keep your follow-up appointment(s). · Please bring discharge papers, medication list (and/or medication bottles) to your follow-up appointments for review by your outpatient provider(s). · Please check the list of medications and be sure it includes every medication (even non-prescription medications) that your provider wants you to take. · It is important that you take the medication exactly as they are prescribed. · Keep your medication in the bottles provided by the pharmacist and keep a list of the medication names, dosages, and times to be taken in your wallet. · Do not take other medications without consulting your doctor. · If you have any questions about your medications or other instructions, please talk to your nurse or care provider before you leave the hospital.    I understand that if any problems occur once I am at home I am to contact my physician. These instructions were explained to me and I had the opportunity to ask questions.

## 2021-10-26 NOTE — PROGRESS NOTES
Problem: Falls - Risk of  Goal: *Absence of Falls  Description: Document Maryan Blood Fall Risk and appropriate interventions in the flowsheet.   Outcome: Resolved/Met  Note: Fall Risk Interventions:            Medication Interventions: Patient to call before getting OOB, Teach patient to arise slowly                   Problem: Patient Education: Go to Patient Education Activity  Goal: Patient/Family Education  Outcome: Resolved/Met     Problem: Breathing Pattern - Ineffective  Goal: *Use of effective breathing techniques  Outcome: Resolved/Met  Goal: *PALLIATIVE CARE:  Alleviation of Dyspnea  Outcome: Resolved/Met     Problem: Patient Education: Go to Patient Education Activity  Goal: Patient/Family Education  Outcome: Resolved/Met     Problem: Alcohol Withdrawal  Goal: *STG: Participates in treatment plan  Outcome: Resolved/Met  Goal: *STG: Remains safe in hospital  Outcome: Resolved/Met  Goal: *STG: Seeks staff when symptoms of withdrawal increase  Outcome: Resolved/Met  Goal: *STG: Complies with medication therapy  Outcome: Resolved/Met  Goal: *STG: Attends activities and groups  Outcome: Resolved/Met  Goal: *STG: Will identify negative impact of chemical dependency including the use of tobacco, alcohol, and other substances  Outcome: Resolved/Met  Goal: *STG: Verbalizes abstinence as an achievable goal  Outcome: Resolved/Met  Goal: *STG: Agrees to participate in outpatient after care program to support ongoing mental health  Outcome: Resolved/Met  Goal: *STG: Able to indentify relapse triggers including interpersonal/social and familial factors  Outcome: Resolved/Met  Goal: *STG: Identify lifestyle changes to support long term sobriety such as vocation, employment, education, and legal issues  Outcome: Resolved/Met  Goal: *STG: Maintains appropriate nutrition and hydration  Outcome: Resolved/Met  Goal: *STG: Vital signs within defined limits  Outcome: Resolved/Met  Goal: *STG/LTG: Relapse prevention plan in place to include housing/aftercare, leisure activities, and spirituality  Outcome: Resolved/Met  Goal: Interventions  Outcome: Resolved/Met     Problem: Patient Education: Go to Patient Education Activity  Goal: Patient/Family Education  Outcome: Resolved/Met

## 2021-10-26 NOTE — PROGRESS NOTES
Problem: Falls - Risk of  Goal: *Absence of Falls  Description: Document Christian Bare Fall Risk and appropriate interventions in the flowsheet.   Outcome: Progressing Towards Goal  Note: Fall Risk Interventions:            Medication Interventions: Bed/chair exit alarm, Evaluate medications/consider consulting pharmacy                   Problem: Alcohol Withdrawal  Goal: *STG: Seeks staff when symptoms of withdrawal increase  Outcome: Progressing Towards Goal  Goal: *STG: Complies with medication therapy  Outcome: Progressing Towards Goal  Goal: *STG: Attends activities and groups  Outcome: Progressing Towards Goal

## 2021-10-26 NOTE — DISCHARGE SUMMARY
Discharge Summary     Patient:  Chi Fofana       MRN: [id number]       YOB: 1950       Age: 70 y.o. Date of admission:  10/23/2021    Date of discharge:  10/26/2021    Primary care provider: Dr. Conor Cazares MD    Admitting provider:  Fozia Wilkins MD    Discharging provider:  Shannan Baird MD - [phone]  If unavailable, call [phone] and ask the  to page the triage hospitalist.    Consultations  · IP CONSULT TO HOSPITALIST  · IP CONSULT TO GASTROENTEROLOGY    Procedures  · Procedure(s) with comments:  · ESOPHAGOGASTRODUODENOSCOPY (EGD)  · RESOLUTION CLIP - 2 Placed  · 1 Wasted    Discharge destination: HOME. The patient is stable for discharge. Admission diagnosis  · Vomiting blood [K92.0]  · Alcohol abuse [F10.10]    Current Discharge Medication List      START taking these medications    Details   pantoprazole (Protonix) 40 mg tablet Take 1 Tablet by mouth two (2) times a day. Qty: 60 Tablet, Refills: 2  Start date: 10/26/2021      !! LORazepam (Ativan) 0.5 mg tablet Take 1 tab BID X 2 days, then 1 tab daily X 2  Qty: 6 Tablet, Refills: 0  Start date: 10/26/2021    Associated Diagnoses: Alcohol abuse       !! - Potential duplicate medications found. Please discuss with provider. CONTINUE these medications which have CHANGED    Details   sucralfate (Carafate) 1 gram tablet Take 1 Tablet by mouth four (4) times daily for 14 days. Qty: 64 Tablet, Refills: 0  Start date: 10/26/2021, End date: 11/9/2021         CONTINUE these medications which have NOT CHANGED    Details   albuterol (PROVENTIL HFA, VENTOLIN HFA, PROAIR HFA) 90 mcg/actuation inhaler Take 2 Puffs by inhalation. Advair HFA 45-21 mcg/actuation inhaler Take 2 Puffs by inhalation two (2) times a day. !! LORazepam (ATIVAN) 1 mg tablet Take 0.5 mg by mouth every eight (8) hours as needed.       ondansetron hcl (ZOFRAN) 4 mg tablet 4 mg every eight (8) hours as needed. budesonide (PULMICORT) 0.5 mg/2 mL nbsp 500 mcg by Nebulization route two (2) times a day. !! - Potential duplicate medications found. Please discuss with provider. STOP taking these medications       montelukast (SINGULAIR) 10 mg tablet Comments:   Reason for Stopping:         sertraline (ZOLOFT) 50 mg tablet Comments:   Reason for Stopping: Follow-up Information     Follow up With Specialties Details Why Contact Info    Juliana Bills MD Family Medicine In 1 week Discharge follow up  [address] [facility] 5  [phone]      Rocio Corrales MD Gastroenterology In 2 months schedule repeat EGD  [place]   [phone]            Final discharge diagnoses and brief hospital course  Please also refer to the admission H&P for details on the presenting problem. 71 yo male wit COPD, Asthma, Chronic alcohol use 2-4 shots Vodka daily, was diagnosed with Rt lower lobe subsegmental PE and started on Eliquis. Patient states he became sob and returned to [facility] but left AMA due to wait time. On 10/23 patient vomited large amounts of blood. [place] [facility] where he had additional bloody vomiting. Pt was transferred to [facility] for admission.        Acute blood loss anemia due to Esophageal Ulcer and severe Esophagitis   - due to UGI bleed suspect related to alcohol use  - GI consulted,  - hb stable 9.2  - PPI bid and carafate  - s/p EGD on 10/25: Severe esophagitis, no varices, 2 distal esophageal ulcer with visible vessels and clot covering them, s/p 2 hemoclips  - counseled strongly against further alcohol use     Recent Dx of Rt Subsegmental PE  - repeat CTA chest NO PE  - Venous duplex no DVTs  - OAC not indicated for subsegmental PE.  With repeat testing showing no PE and on going UGI bleed, will not resume OAC     COPD Exacerbation   - resolved, recently completed prednisone, does not need further treatment      Chronic alcohol abuse with possible alcohol withdrawals  - significant tremors on exam   - ativan taper   - per home med list, pt on Ativan TID PRN at home          Hypomagnesemia   - replaced         FOLLOW-UP TESTS recommended:   - repeat EGD in 2-3 months     PENDING TEST RESULTS:  At the time of your discharge the following test results are still pending: none  Please make sure you review these results with your outpatient follow-up provider(s).     SYMPTOMS to watch for: chest pain, shortness of breath, fever, chills, nausea, vomiting, diarrhea, change in mentation, falling, weakness, bleeding.     DIET/what to eat:   Soft diet, advance as tolerated in 3-4 days      ACTIVITY:  Activity as tolerated     WOUND CARE: NONE     EQUIPMENT needed:  NONE  Physical examination at discharge  Visit Vitals  BP (!) 150/102   Pulse 86   Temp 97.6 °F (36.4 °C)   Resp 22   Ht 5' 7\" (1.702 m)   Wt 76 kg (167 lb 9.6 oz)   SpO2 98%   BMI 26.25 kg/m²     Ao3  Lung clear  CVS: RRR  Abd; soft NT ND   Ext: no edema     Pertinent imaging studies:    EGD  Esophagus:hiatal hernia 5 cm in size      There was severe esophagitis at G-E junction  No varices seen  There were 2 esophageal ulcers in distal esophagus around 1 cm in size each, with visible vessels and clots covering them, I then placed 2 hemoclips ( resolution clips , BS) respectively on both ulcers      Stomach: normal   Duodenum/jejunum: normal         Recent Labs     10/26/21  0458 10/25/21  0147 10/24/21  0548   WBC 3.0* 3.1* 4.8   HGB 9.2* 9.1* 10.3*   HCT 27.9* 28.1* 31.0*    144* 167     Recent Labs     10/26/21  0458 10/25/21  0147 10/24/21  0548    138 137   K 3.9 3.5 3.9   * 109* 110*   CO2 23 23 24   BUN 5* 9 15   CREA 0.65* 0.67* 0.70   * 172* 147*   CA 8.0* 7.9* 8.3*   MG  --  1.6 1.7   PHOS  --   --  2.3*     Recent Labs     10/24/21  0548 10/23/21  1237    139*   TP 6.4 7.3 ALB 2.5* 3.2*   GLOB 3.9 4.1*     Recent Labs     10/23/21  1631   INR 1.3*   PTP 13.1*   APTT 23.3      No results for input(s): FE, TIBC, PSAT, FERR in the last 72 hours. No results for input(s): PH, PCO2, PO2 in the last 72 hours. No results for input(s): CPK, CKMB in the last 72 hours. No lab exists for component: TROPONINI  No components found for: [place]    Chronic Diagnoses:    Problem List as of 10/26/2021 Date Reviewed: 10/25/2021        Codes Class Noted - Resolved    Alcohol abuse ICD-10-CM: F10.10  ICD-9-CM: 305.00  10/23/2021 - Present        Vomiting blood ICD-10-CM: K92.0  ICD-9-CM: 578.0  10/23/2021 - Present              Time spent on discharge related activities today greater than 30 minutes.       Signed:  Matthias Norton MD                 Hospitalist, Internal Medicine      Cc: Kristy Bills MD

## 2021-10-26 NOTE — PROGRESS NOTES
Bedside and Verbal shift change report given to [address] (oncoming nurse) by [address] (offgoing nurse). Report included the following information SBAR, Kardex, Intake/Output, MAR, Recent Results, Cardiac Rhythm NSR and Dual Neuro Assessment.

## 2021-10-26 NOTE — PROGRESS NOTES
Hospital follow-up PCP transitional care appointment has been scheduled with Dr. Petr Lackey for Monday, 11/1/21 at 1:00 p.m. Pending patient discharge.   Nabor Romeo, Care Management Specialist.

## 2021-10-26 NOTE — PROGRESS NOTES
Outpatient Physical Therapy  DAILY TREATMENT     [facility] Outpatient Physical Therapy [place] [address] [facility], [address]  [place] [address]  Phone:  [phone]    Date: 08/25/2021    Patient: Lucía Rodriguez  YOB: 1949  MRN: [id number]     Time Calculation    Start time: 1030  Stop time: 1111 Time Calculation (min): 41 minutes     Chief Complaint: No chief complaint on file.    Visit #: 12    SUBJECTIVE:  Been doing a little movement, but neck has regressed due to not being able to perform PT due to the procedure.     OBJECTIVE:  45 degree torso can move 80% cervical extension and rotation AROM  0 degrees in standing.     C5 referral pattern with neck closing pattern.       Therapeutic Exercises (CPT 91295):     1. UPOC - 7/25/2021      Therapeutic Exercise Summary:   Rows - 3 sets purple/pink  serratus press with cervical extension - 2 sets   sidelying cervical rotation - 2 sets   Cervical Rotation to left - 20x   Chin tucks with SB - improves left rotation, but symptoms with C5 referral (hold)   Rear Delt Flies - 3 sets   Shrugs - 3# 2 x 15    Resisted Chin Tucks - 10 sets   SL ER -  3 sets at 1 pound    Therapeutic Treatments and Modalities:     1. Manual Therapy (CPT 29929)    Therapeutic Treatment and Modalities Summary:   (held)  Cervical Rotation with SNAG CTJ.   IASTM upper trapezius     Time-based treatments/modalities:    Physical Therapy Timed Treatment Charges  Therapeutic exercise minutes (CPT 31578): 41 minutes    ASSESSMENT:   Re-introduced loading program this visit, and encouraged return to HEP.  Patient has regressed secondary to lack of clearance to return attempted filter removal.  If soreness allows, will progress to full program next visit.     PLAN/RECOMMENDATIONS:   Plan for treatment: therapy treatment to continue next visit.  Planned interventions for next visit: continue with current treatment.        [address]  [address] [facility], [address]       GI PROGRESS NOTE  Michelle Crowder, [facility] ([place] office  [phone] NP/PA in-hospital cell phone M-F until 4:30PM  After 5PM or on weekends, please call  for physician on call      NAME: Lesvia Robbins   :  1950   MRN:  [id number]       Subjective:   Doing well. Objective:     VITALS:   Last 24hrs VS reviewed since prior progress note. Most recent are:  Visit Vitals  BP (!) 150/102   Pulse 86   Temp 97.6 °F (36.4 °C)   Resp 22   Ht 5' 7\" (1.702 m)   Wt 76 kg (167 lb 9.6 oz)   SpO2 98%   BMI 26.25 kg/m²       PHYSICAL EXAM:  General: Cooperative, no acute distress    Neurologic:  Alert and oriented  HEENT: EOMI, no scleral icterus   Lungs:  No acute respiratory distress  Heart:  Regular rate  Abdomen: Soft, non-distended, no tenderness, no guarding, no rebound. Extremities: Warm  Psych:   Not anxious or agitated      Lab Data Reviewed:     Recent Results (from the past 24 hour(s))   CBC WITH AUTOMATED DIFF    Collection Time: 10/26/21  4:58 AM   Result Value Ref Range    WBC 3.0 (L) 4.1 - 11.1 K/uL    RBC 2.85 (L) 4.10 - 5.70 M/uL    HGB 9.2 (L) 12.1 - 17.0 g/dL    HCT 27.9 (L) 36.6 - 50.3 %    MCV 97.9 80.0 - 99.0 FL    MCH 32.3 26.0 - 34.0 PG    MCHC 33.0 30.0 - 36.5 g/dL    RDW 14.7 (H) 11.5 - 14.5 %    PLATELET 313 752 - 604 K/uL    MPV 9.5 8.9 - 12.9 FL    NRBC 0.0 0  WBC    ABSOLUTE NRBC 0.00 0.00 - 0.01 K/uL    NEUTROPHILS 80 (H) 32 - 75 %    LYMPHOCYTES 16 12 - 49 %    MONOCYTES 3 (L) 5 - 13 %    EOSINOPHILS 0 0 - 7 %    BASOPHILS 0 0 - 1 %    IMMATURE GRANULOCYTES 1 (H) 0.0 - 0.5 %    ABS. NEUTROPHILS 2.4 1.8 - 8.0 K/UL    ABS. LYMPHOCYTES 0.5 (L) 0.8 - 3.5 K/UL    ABS. MONOCYTES 0.1 0.0 - 1.0 K/UL    ABS. EOSINOPHILS 0.0 0.0 - 0.4 K/UL    ABS. BASOPHILS 0.0 0.0 - 0.1 K/UL    ABS. IMM.  GRANS. 0.0 0.00 - 0.04 K/UL    DF SMEAR SCANNED      RBC COMMENTS MACROCYTOSIS  1+       METABOLIC PANEL, BASIC Collection Time: 10/26/21  4:58 AM   Result Value Ref Range    Sodium 139 136 - 145 mmol/L    Potassium 3.9 3.5 - 5.1 mmol/L    Chloride 110 (H) 97 - 108 mmol/L    CO2 23 21 - 32 mmol/L    Anion gap 6 5 - 15 mmol/L    Glucose 140 (H) 65 - 100 mg/dL    BUN 5 (L) 6 - 20 MG/DL    Creatinine 0.65 (L) 0.70 - 1.30 MG/DL    BUN/Creatinine ratio 8 (L) 12 - 20      GFR est AA >60 >60 ml/min/1.73m2    GFR est non-AA >60 >60 ml/min/1.73m2    Calcium 8.0 (L) 8.5 - 10.1 MG/DL   GLUCOSE, POC    Collection Time: 10/26/21  9:00 AM   Result Value Ref Range    Glucose (POC) 185 (H) 65 - 117 mg/dL    Performed by Morgan Akhtar         Assessment:   · Hematemesis: on Eliquis prior to admission. Hgb 9.1<--10.3<--12.3, platelets 297, INR 1.3.  EGD 10/25/21: 5 cm hiatal hernia, severe esophagitis at [facility], 2 esophageal ulcers around 1 cm with visible vessels and clots treated with hemoclips  · Elevated LFTs, normalized  · Alcohol abuse     Patient Active Problem List   Diagnosis Code    Alcohol abuse F10.10    Vomiting blood K92.0     Plan:   · Agree with plans for discharge  · PPI BID  · He has GI in [place] he would like to follow-up with for repeat EGD  · Hold Eliquis x1 week if able     Signed By: Sarah Beth Pinto NP

## 2021-11-09 ENCOUNTER — APPOINTMENT (OUTPATIENT)
Dept: CT IMAGING | Age: 71
DRG: 897 | End: 2021-11-09
Attending: EMERGENCY MEDICINE
Payer: MEDICARE

## 2021-11-09 ENCOUNTER — APPOINTMENT (OUTPATIENT)
Dept: GENERAL RADIOLOGY | Age: 71
DRG: 897 | End: 2021-11-09
Attending: EMERGENCY MEDICINE
Payer: MEDICARE

## 2021-11-09 ENCOUNTER — HOSPITAL ENCOUNTER (INPATIENT)
Age: 71
LOS: 8 days | Discharge: HOME | DRG: 897 | End: 2021-11-19
Attending: EMERGENCY MEDICINE | Admitting: HOSPITALIST
Payer: MEDICARE

## 2021-11-09 DIAGNOSIS — J44.9 CHRONIC OBSTRUCTIVE PULMONARY DISEASE, UNSPECIFIED COPD TYPE (HCC): ICD-10-CM

## 2021-11-09 DIAGNOSIS — F10.930 ALCOHOL WITHDRAWAL SYNDROME WITHOUT COMPLICATION (HCC): Primary | ICD-10-CM

## 2021-11-09 DIAGNOSIS — E86.0 DEHYDRATION: ICD-10-CM

## 2021-11-09 DIAGNOSIS — K59.00 CONSTIPATION, UNSPECIFIED CONSTIPATION TYPE: ICD-10-CM

## 2021-11-09 DIAGNOSIS — E87.6 HYPOKALEMIA: ICD-10-CM

## 2021-11-09 PROBLEM — J44.1 COPD EXACERBATION (HCC): Status: ACTIVE | Noted: 2021-11-09

## 2021-11-09 PROBLEM — F10.939 ALCOHOL WITHDRAWAL (HCC): Status: ACTIVE | Noted: 2021-11-09

## 2021-11-09 LAB
ALBUMIN SERPL-MCNC: 3 G/DL (ref 3.5–5)
ALBUMIN/GLOB SERPL: 0.8 {RATIO} (ref 1.1–2.2)
ALP SERPL-CCNC: 162 U/L (ref 45–117)
ALT SERPL-CCNC: 53 U/L (ref 12–78)
AMPHET UR QL SCN: NEGATIVE
ANION GAP SERPL CALC-SCNC: 18 MMOL/L (ref 5–15)
ANION GAP SERPL CALC-SCNC: 22 MMOL/L (ref 5–15)
AST SERPL-CCNC: 52 U/L (ref 15–37)
ATRIAL RATE: 105 BPM
BARBITURATES UR QL SCN: NEGATIVE
BASOPHILS # BLD: 0 K/UL (ref 0–0.1)
BASOPHILS NFR BLD: 1 % (ref 0–1)
BENZODIAZ UR QL: NEGATIVE
BILIRUB SERPL-MCNC: 0.9 MG/DL (ref 0.2–1)
BUN SERPL-MCNC: 6 MG/DL (ref 6–20)
BUN SERPL-MCNC: 6 MG/DL (ref 6–20)
BUN/CREAT SERPL: 6 (ref 12–20)
BUN/CREAT SERPL: 6 (ref 12–20)
CALCIUM SERPL-MCNC: 7.5 MG/DL (ref 8.5–10.1)
CALCIUM SERPL-MCNC: 8.6 MG/DL (ref 8.5–10.1)
CALCULATED P AXIS, ECG09: 52 DEGREES
CALCULATED R AXIS, ECG10: 60 DEGREES
CALCULATED T AXIS, ECG11: 70 DEGREES
CANNABINOIDS UR QL SCN: NEGATIVE
CHLORIDE SERPL-SCNC: 100 MMOL/L (ref 97–108)
CHLORIDE SERPL-SCNC: 103 MMOL/L (ref 97–108)
CO2 SERPL-SCNC: 19 MMOL/L (ref 21–32)
CO2 SERPL-SCNC: 21 MMOL/L (ref 21–32)
COCAINE UR QL SCN: NEGATIVE
COMMENT, HOLDF: NORMAL
COVID-19 RAPID TEST, COVR: NOT DETECTED
CREAT SERPL-MCNC: 0.96 MG/DL (ref 0.7–1.3)
CREAT SERPL-MCNC: 0.97 MG/DL (ref 0.7–1.3)
DIAGNOSIS, 93000: NORMAL
DIFFERENTIAL METHOD BLD: ABNORMAL
DRUG SCRN COMMENT,DRGCM: NORMAL
EOSINOPHIL # BLD: 0.1 K/UL (ref 0–0.4)
EOSINOPHIL NFR BLD: 1 % (ref 0–7)
ERYTHROCYTE [DISTWIDTH] IN BLOOD BY AUTOMATED COUNT: 14.3 % (ref 11.5–14.5)
EST. AVERAGE GLUCOSE BLD GHB EST-MCNC: 105 MG/DL
ETHANOL SERPL-MCNC: 168 MG/DL
FLUAV AG NPH QL IA: NEGATIVE
FLUBV AG NOSE QL IA: NEGATIVE
GLOBULIN SER CALC-MCNC: 3.8 G/DL (ref 2–4)
GLUCOSE SERPL-MCNC: 124 MG/DL (ref 65–100)
GLUCOSE SERPL-MCNC: 155 MG/DL (ref 65–100)
HBA1C MFR BLD: 5.3 % (ref 4–5.6)
HCT VFR BLD AUTO: 33.2 % (ref 36.6–50.3)
HGB BLD-MCNC: 11.2 G/DL (ref 12.1–17)
IMM GRANULOCYTES # BLD AUTO: 0 K/UL (ref 0–0.04)
IMM GRANULOCYTES NFR BLD AUTO: 0 % (ref 0–0.5)
LYMPHOCYTES # BLD: 1.9 K/UL (ref 0.8–3.5)
LYMPHOCYTES NFR BLD: 47 % (ref 12–49)
MAGNESIUM SERPL-MCNC: 1.2 MG/DL (ref 1.6–2.4)
MAGNESIUM SERPL-MCNC: 1.3 MG/DL (ref 1.6–2.4)
MCH RBC QN AUTO: 30.8 PG (ref 26–34)
MCHC RBC AUTO-ENTMCNC: 33.7 G/DL (ref 30–36.5)
MCV RBC AUTO: 91.2 FL (ref 80–99)
METHADONE UR QL: NEGATIVE
MONOCYTES # BLD: 0.4 K/UL (ref 0–1)
MONOCYTES NFR BLD: 10 % (ref 5–13)
NEUTS SEG # BLD: 1.7 K/UL (ref 1.8–8)
NEUTS SEG NFR BLD: 41 % (ref 32–75)
NRBC # BLD: 0 K/UL (ref 0–0.01)
NRBC BLD-RTO: 0 PER 100 WBC
OPIATES UR QL: NEGATIVE
P-R INTERVAL, ECG05: 130 MS
PCP UR QL: NEGATIVE
PLATELET # BLD AUTO: 188 K/UL (ref 150–400)
PMV BLD AUTO: 9 FL (ref 8.9–12.9)
POTASSIUM SERPL-SCNC: 2.7 MMOL/L (ref 3.5–5.1)
POTASSIUM SERPL-SCNC: 2.8 MMOL/L (ref 3.5–5.1)
PROT SERPL-MCNC: 6.8 G/DL (ref 6.4–8.2)
Q-T INTERVAL, ECG07: 342 MS
QRS DURATION, ECG06: 84 MS
QTC CALCULATION (BEZET), ECG08: 452 MS
RBC # BLD AUTO: 3.64 M/UL (ref 4.1–5.7)
SAMPLES BEING HELD,HOLD: NORMAL
SODIUM SERPL-SCNC: 141 MMOL/L (ref 136–145)
SODIUM SERPL-SCNC: 142 MMOL/L (ref 136–145)
SOURCE, COVRS: NORMAL
TROPONIN-HIGH SENSITIVITY: 6 NG/L (ref 0–76)
VENTRICULAR RATE, ECG03: 105 BPM
WBC # BLD AUTO: 4.1 K/UL (ref 4.1–11.1)

## 2021-11-09 PROCEDURE — 84484 ASSAY OF TROPONIN QUANT: CPT

## 2021-11-09 PROCEDURE — 83735 ASSAY OF MAGNESIUM: CPT

## 2021-11-09 PROCEDURE — 77010033678 HC OXYGEN DAILY

## 2021-11-09 PROCEDURE — 96374 THER/PROPH/DIAG INJ IV PUSH: CPT

## 2021-11-09 PROCEDURE — 82077 ASSAY SPEC XCP UR&BREATH IA: CPT

## 2021-11-09 PROCEDURE — 77030029684 HC NEB SM VOL KT MONA -A

## 2021-11-09 PROCEDURE — 74176 CT ABD & PELVIS W/O CONTRAST: CPT

## 2021-11-09 PROCEDURE — 74011250636 HC RX REV CODE- 250/636: Performed by: STUDENT IN AN ORGANIZED HEALTH CARE EDUCATION/TRAINING PROGRAM

## 2021-11-09 PROCEDURE — 94760 N-INVAS EAR/PLS OXIMETRY 1: CPT

## 2021-11-09 PROCEDURE — 94640 AIRWAY INHALATION TREATMENT: CPT

## 2021-11-09 PROCEDURE — 83036 HEMOGLOBIN GLYCOSYLATED A1C: CPT

## 2021-11-09 PROCEDURE — 85025 COMPLETE CBC W/AUTO DIFF WBC: CPT

## 2021-11-09 PROCEDURE — 74011000250 HC RX REV CODE- 250: Performed by: STUDENT IN AN ORGANIZED HEALTH CARE EDUCATION/TRAINING PROGRAM

## 2021-11-09 PROCEDURE — 74011250637 HC RX REV CODE- 250/637: Performed by: EMERGENCY MEDICINE

## 2021-11-09 PROCEDURE — 96361 HYDRATE IV INFUSION ADD-ON: CPT

## 2021-11-09 PROCEDURE — 96376 TX/PRO/DX INJ SAME DRUG ADON: CPT

## 2021-11-09 PROCEDURE — 74011636637 HC RX REV CODE- 636/637: Performed by: PHYSICIAN ASSISTANT

## 2021-11-09 PROCEDURE — 71045 X-RAY EXAM CHEST 1 VIEW: CPT

## 2021-11-09 PROCEDURE — 74011636637 HC RX REV CODE- 636/637: Performed by: EMERGENCY MEDICINE

## 2021-11-09 PROCEDURE — 87804 INFLUENZA ASSAY W/OPTIC: CPT

## 2021-11-09 PROCEDURE — 80053 COMPREHEN METABOLIC PANEL: CPT

## 2021-11-09 PROCEDURE — 74011250637 HC RX REV CODE- 250/637: Performed by: STUDENT IN AN ORGANIZED HEALTH CARE EDUCATION/TRAINING PROGRAM

## 2021-11-09 PROCEDURE — 36415 COLL VENOUS BLD VENIPUNCTURE: CPT

## 2021-11-09 PROCEDURE — A9270 NON-COVERED ITEM OR SERVICE: HCPCS | Performed by: EMERGENCY MEDICINE

## 2021-11-09 PROCEDURE — 80307 DRUG TEST PRSMV CHEM ANLYZR: CPT

## 2021-11-09 PROCEDURE — 96372 THER/PROPH/DIAG INJ SC/IM: CPT

## 2021-11-09 PROCEDURE — 96375 TX/PRO/DX INJ NEW DRUG ADDON: CPT

## 2021-11-09 PROCEDURE — 93005 ELECTROCARDIOGRAM TRACING: CPT

## 2021-11-09 PROCEDURE — 74011250636 HC RX REV CODE- 250/636: Performed by: EMERGENCY MEDICINE

## 2021-11-09 PROCEDURE — 87635 SARS-COV-2 COVID-19 AMP PRB: CPT

## 2021-11-09 PROCEDURE — G0378 HOSPITAL OBSERVATION PER HR: HCPCS

## 2021-11-09 PROCEDURE — 99285 EMERGENCY DEPT VISIT HI MDM: CPT

## 2021-11-09 RX ORDER — SODIUM CHLORIDE 0.9 % (FLUSH) 0.9 %
5-40 SYRINGE (ML) INJECTION EVERY 8 HOURS
Status: DISCONTINUED | OUTPATIENT
Start: 2021-11-09 | End: 2021-11-19 | Stop reason: HOSPADM

## 2021-11-09 RX ORDER — POTASSIUM CHLORIDE 750 MG/1
20 TABLET, FILM COATED, EXTENDED RELEASE ORAL
Status: COMPLETED | OUTPATIENT
Start: 2021-11-09 | End: 2021-11-09

## 2021-11-09 RX ORDER — LORAZEPAM 2 MG/ML
2 INJECTION INTRAMUSCULAR
Status: DISCONTINUED | OUTPATIENT
Start: 2021-11-09 | End: 2021-11-19 | Stop reason: HOSPADM

## 2021-11-09 RX ORDER — LANOLIN ALCOHOL/MO/W.PET/CERES
100 CREAM (GRAM) TOPICAL DAILY
Status: DISCONTINUED | OUTPATIENT
Start: 2021-11-10 | End: 2021-11-19 | Stop reason: HOSPADM

## 2021-11-09 RX ORDER — CALCIUM CARBONATE 200(500)MG
200 TABLET,CHEWABLE ORAL AS NEEDED
Status: DISCONTINUED | OUTPATIENT
Start: 2021-11-09 | End: 2021-11-19 | Stop reason: HOSPADM

## 2021-11-09 RX ORDER — LORAZEPAM 2 MG/ML
1 INJECTION INTRAMUSCULAR
Status: COMPLETED | OUTPATIENT
Start: 2021-11-09 | End: 2021-11-09

## 2021-11-09 RX ORDER — IPRATROPIUM BROMIDE AND ALBUTEROL SULFATE 2.5; .5 MG/3ML; MG/3ML
3 SOLUTION RESPIRATORY (INHALATION)
Status: DISCONTINUED | OUTPATIENT
Start: 2021-11-09 | End: 2021-11-10

## 2021-11-09 RX ORDER — ONDANSETRON 4 MG/1
4 TABLET, ORALLY DISINTEGRATING ORAL
Status: DISCONTINUED | OUTPATIENT
Start: 2021-11-09 | End: 2021-11-19 | Stop reason: HOSPADM

## 2021-11-09 RX ORDER — MAGNESIUM SULFATE HEPTAHYDRATE 40 MG/ML
2 INJECTION, SOLUTION INTRAVENOUS ONCE
Status: COMPLETED | OUTPATIENT
Start: 2021-11-09 | End: 2021-11-09

## 2021-11-09 RX ORDER — ONDANSETRON 2 MG/ML
4 INJECTION INTRAMUSCULAR; INTRAVENOUS ONCE
Status: COMPLETED | OUTPATIENT
Start: 2021-11-09 | End: 2021-11-09

## 2021-11-09 RX ORDER — ACETAMINOPHEN 650 MG/1
650 SUPPOSITORY RECTAL
Status: DISCONTINUED | OUTPATIENT
Start: 2021-11-09 | End: 2021-11-19 | Stop reason: HOSPADM

## 2021-11-09 RX ORDER — ACETAMINOPHEN 325 MG/1
650 TABLET ORAL
Status: DISCONTINUED | OUTPATIENT
Start: 2021-11-09 | End: 2021-11-19 | Stop reason: HOSPADM

## 2021-11-09 RX ORDER — PREDNISONE 20 MG/1
40 TABLET ORAL
Status: DISCONTINUED | OUTPATIENT
Start: 2021-11-09 | End: 2021-11-11

## 2021-11-09 RX ORDER — PREDNISONE 10 MG/1
TABLET ORAL
Qty: 21 TABLET | Refills: 0 | Status: SHIPPED | OUTPATIENT
Start: 2021-11-09 | End: 2021-11-19

## 2021-11-09 RX ORDER — ONDANSETRON 2 MG/ML
4 INJECTION INTRAMUSCULAR; INTRAVENOUS
Status: DISCONTINUED | OUTPATIENT
Start: 2021-11-09 | End: 2021-11-19 | Stop reason: HOSPADM

## 2021-11-09 RX ORDER — ENOXAPARIN SODIUM 100 MG/ML
40 INJECTION SUBCUTANEOUS DAILY
Status: DISCONTINUED | OUTPATIENT
Start: 2021-11-09 | End: 2021-11-19 | Stop reason: HOSPADM

## 2021-11-09 RX ORDER — BUDESONIDE 0.5 MG/2ML
500 INHALANT ORAL
Status: DISCONTINUED | OUTPATIENT
Start: 2021-11-09 | End: 2021-11-19 | Stop reason: HOSPADM

## 2021-11-09 RX ORDER — POLYETHYLENE GLYCOL 3350 17 G/17G
17 POWDER, FOR SOLUTION ORAL DAILY PRN
Status: DISCONTINUED | OUTPATIENT
Start: 2021-11-09 | End: 2021-11-19 | Stop reason: HOSPADM

## 2021-11-09 RX ORDER — SODIUM CHLORIDE 0.9 % (FLUSH) 0.9 %
5-40 SYRINGE (ML) INJECTION AS NEEDED
Status: DISCONTINUED | OUTPATIENT
Start: 2021-11-09 | End: 2021-11-19 | Stop reason: HOSPADM

## 2021-11-09 RX ORDER — POLYETHYLENE GLYCOL 3350 17 G/17G
17 POWDER, FOR SOLUTION ORAL DAILY
Qty: 235 G | Refills: 0 | Status: SHIPPED | OUTPATIENT
Start: 2021-11-09

## 2021-11-09 RX ORDER — POTASSIUM CHLORIDE 750 MG/1
40 TABLET, FILM COATED, EXTENDED RELEASE ORAL
Status: COMPLETED | OUTPATIENT
Start: 2021-11-09 | End: 2021-11-09

## 2021-11-09 RX ORDER — LORAZEPAM 2 MG/1
2 TABLET ORAL ONCE
Status: COMPLETED | OUTPATIENT
Start: 2021-11-09 | End: 2021-11-09

## 2021-11-09 RX ORDER — SODIUM CHLORIDE 9 MG/ML
75 INJECTION, SOLUTION INTRAVENOUS CONTINUOUS
Status: DISCONTINUED | OUTPATIENT
Start: 2021-11-09 | End: 2021-11-15

## 2021-11-09 RX ORDER — ARFORMOTEROL TARTRATE 15 UG/2ML
2 SOLUTION RESPIRATORY (INHALATION)
Status: DISCONTINUED | OUTPATIENT
Start: 2021-11-09 | End: 2021-11-19 | Stop reason: HOSPADM

## 2021-11-09 RX ORDER — PREDNISONE 20 MG/1
60 TABLET ORAL
Status: COMPLETED | OUTPATIENT
Start: 2021-11-09 | End: 2021-11-09

## 2021-11-09 RX ORDER — FOLIC ACID 1 MG/1
1 TABLET ORAL DAILY
Status: DISCONTINUED | OUTPATIENT
Start: 2021-11-10 | End: 2021-11-19 | Stop reason: HOSPADM

## 2021-11-09 RX ORDER — LORAZEPAM 2 MG/ML
4 INJECTION INTRAMUSCULAR
Status: DISCONTINUED | OUTPATIENT
Start: 2021-11-09 | End: 2021-11-15

## 2021-11-09 RX ADMIN — PREDNISONE 40 MG: 20 TABLET ORAL at 11:04

## 2021-11-09 RX ADMIN — POTASSIUM CHLORIDE 40 MEQ: 750 TABLET, FILM COATED, EXTENDED RELEASE ORAL at 04:50

## 2021-11-09 RX ADMIN — ARFORMOTEROL TARTRATE 15 MCG: 15 SOLUTION RESPIRATORY (INHALATION) at 11:28

## 2021-11-09 RX ADMIN — LORAZEPAM 2 MG: 2 TABLET ORAL at 12:03

## 2021-11-09 RX ADMIN — Medication 10 ML: at 15:55

## 2021-11-09 RX ADMIN — PREDNISONE 60 MG: 20 TABLET ORAL at 04:23

## 2021-11-09 RX ADMIN — BUDESONIDE 500 MCG: 0.5 INHALANT RESPIRATORY (INHALATION) at 11:29

## 2021-11-09 RX ADMIN — Medication 10 ML: at 12:04

## 2021-11-09 RX ADMIN — MAGNESIUM SULFATE 2 G: 2 INJECTION INTRAVENOUS at 15:54

## 2021-11-09 RX ADMIN — LORAZEPAM 1 MG: 2 INJECTION INTRAMUSCULAR; INTRAVENOUS at 05:23

## 2021-11-09 RX ADMIN — POTASSIUM CHLORIDE 20 MEQ: 750 TABLET, FILM COATED, EXTENDED RELEASE ORAL at 12:03

## 2021-11-09 RX ADMIN — IPRATROPIUM BROMIDE AND ALBUTEROL SULFATE 3 ML: .5; 3 SOLUTION RESPIRATORY (INHALATION) at 20:47

## 2021-11-09 RX ADMIN — BUDESONIDE 500 MCG: 0.5 INHALANT RESPIRATORY (INHALATION) at 20:47

## 2021-11-09 RX ADMIN — SODIUM CHLORIDE 500 ML: 9 INJECTION, SOLUTION INTRAVENOUS at 04:46

## 2021-11-09 RX ADMIN — LORAZEPAM 2 MG: 2 INJECTION INTRAMUSCULAR; INTRAVENOUS at 18:25

## 2021-11-09 RX ADMIN — Medication 10 ML: at 22:07

## 2021-11-09 RX ADMIN — IPRATROPIUM BROMIDE AND ALBUTEROL SULFATE 3 ML: .5; 3 SOLUTION RESPIRATORY (INHALATION) at 11:28

## 2021-11-09 RX ADMIN — ARFORMOTEROL TARTRATE 15 MCG: 15 SOLUTION RESPIRATORY (INHALATION) at 20:47

## 2021-11-09 RX ADMIN — SODIUM CHLORIDE 125 ML/HR: 9 INJECTION, SOLUTION INTRAVENOUS at 22:07

## 2021-11-09 RX ADMIN — POTASSIUM CHLORIDE 20 MEQ: 750 TABLET, FILM COATED, EXTENDED RELEASE ORAL at 14:00

## 2021-11-09 RX ADMIN — ONDANSETRON 4 MG: 2 INJECTION INTRAMUSCULAR; INTRAVENOUS at 05:22

## 2021-11-09 RX ADMIN — CALCIUM CARBONATE (ANTACID) CHEW TAB 500 MG 200 MG: 500 CHEW TAB at 18:25

## 2021-11-09 RX ADMIN — ENOXAPARIN SODIUM 40 MG: 100 INJECTION SUBCUTANEOUS at 11:04

## 2021-11-09 RX ADMIN — MULTIPLE VITAMINS W/ MINERALS TAB 1 TABLET: TAB at 12:03

## 2021-11-09 RX ADMIN — IPRATROPIUM BROMIDE AND ALBUTEROL SULFATE 3 ML: .5; 3 SOLUTION RESPIRATORY (INHALATION) at 15:16

## 2021-11-09 RX ADMIN — SODIUM CHLORIDE 500 ML: 9 INJECTION, SOLUTION INTRAVENOUS at 02:28

## 2021-11-09 RX ADMIN — ONDANSETRON 4 MG: 2 INJECTION INTRAMUSCULAR; INTRAVENOUS at 03:57

## 2021-11-09 RX ADMIN — LORAZEPAM 2 MG: 2 INJECTION INTRAMUSCULAR; INTRAVENOUS at 15:54

## 2021-11-09 RX ADMIN — POTASSIUM CHLORIDE 20 MEQ: 750 TABLET, FILM COATED, EXTENDED RELEASE ORAL at 11:04

## 2021-11-09 RX ADMIN — IPRATROPIUM BROMIDE AND ALBUTEROL SULFATE 3 ML: .5; 3 SOLUTION RESPIRATORY (INHALATION) at 23:19

## 2021-11-09 RX ADMIN — POTASSIUM CHLORIDE 20 MEQ: 750 TABLET, FILM COATED, EXTENDED RELEASE ORAL at 13:09

## 2021-11-09 RX ADMIN — LORAZEPAM 2 MG: 2 INJECTION INTRAMUSCULAR; INTRAVENOUS at 22:09

## 2021-11-09 RX ADMIN — FOLIC ACID: 5 INJECTION, SOLUTION INTRAMUSCULAR; INTRAVENOUS; SUBCUTANEOUS at 13:09

## 2021-11-09 NOTE — ED NOTES
Pt returned back to [facility] from [facility].

## 2021-11-09 NOTE — PROGRESS NOTES
Pt arrived from [facility] via [facility]. Assisted to bedside commode.  Tremulous, requested emisis bag, no n/v.

## 2021-11-09 NOTE — ED NOTES
TRANSFER - OUT REPORT:    Verbal report given to Allegra Guo RN (name) on Ashley Barnard  being transferred to [facility] (unit) for routine progression of care       Report consisted of patients Situation, Background, Assessment and   Recommendations(SBAR). Information from the following report(s) Intake/Output and MAR was reviewed with the receiving nurse. Lines:   Peripheral IV 11/09/21 Left Antecubital (Active)   Site Assessment Clean, dry, & intact 11/09/21 0227   Phlebitis Assessment 0 11/09/21 0227   Infiltration Assessment 0 11/09/21 0227   Dressing Status Clean, dry, & intact 11/09/21 0227   Dressing Type Transparent 11/09/21 0227   Hub Color/Line Status Pink; Flushed 11/09/21 0227   Action Taken Blood drawn 11/09/21 0227   Alcohol Cap Used Yes 11/09/21 0227        Opportunity for questions and clarification was provided.       Patient transported with:   Monitor  O2 @ 2 liters

## 2021-11-09 NOTE — ED NOTES
Pt assisted back to bed with fall precautions. Purposeful rounding completed. Pt informed of time factors with lab/imaging study results. Pt resting on the stretcher in a position of comfort. Call bell within reach; will continue to monitor.

## 2021-11-09 NOTE — ED PROVIDER NOTES
27-year-old male with history of COPD/asthma presents with complaints of shortness of breath starting yesterday morning. Patient reports he used his albuterol inhaler/nebulizer 10 times today with relief in the morning but is not working well now. Denies fever, chills. Denies chest pain. Denies any history of DVT/PE. Denies leg swelling. Denies abdominal pain, diarrhea, constipation, urinary complaints. Patient wears 2 L nasal cannula at home. Denies tobacco use, drug use  Regular alcohol use  Pulmonology-manish  pmd-klecan    Recent admission 10/23-10/26/21 for etoh abuse with vomiting. Past Medical History:   Diagnosis Date    Chronic obstructive pulmonary disease ([facility] [address]        History reviewed. No pertinent surgical history. History reviewed. No pertinent family history. Social History     Socioeconomic History    Marital status:      Spouse name: Not on file    Number of children: Not on file    Years of education: Not on file    Highest education level: Not on file   Occupational History    Not on file   Tobacco Use    Smoking status: Never Smoker    Smokeless tobacco: Never Used   Vaping Use    Vaping Use: Never used   Substance and Sexual Activity    Alcohol use: Yes     Comment: Daily drinker; 4oz     Drug use: Never    Sexual activity: Not on file   Other Topics Concern    Not on file   Social History Narrative    Not on file     Social Determinants of Health     Financial Resource Strain:     Difficulty of Paying Living Expenses: Not on file   Food Insecurity:     Worried About Running Out of Food in the Last Year: Not on file    Nhan of Food in the Last Year: Not on file   Transportation Needs:     Lack of Transportation (Medical): Not on file    Lack of Transportation (Non-Medical):  Not on file   Physical Activity:     Days of Exercise per Week: Not on file    Minutes of Exercise per Session: Not on file   Stress:     Feeling of Stress : Not on file   Social Connections:     Frequency of Communication with Friends and Family: Not on file    Frequency of Social Gatherings with Friends and Family: Not on file    Attends Scientologist Services: Not on file    Active Member of Clubs or Organizations: Not on file    Attends Club or Organization Meetings: Not on file    Marital Status: Not on file   Intimate Partner Violence:     Fear of Current or Ex-Partner: Not on file    Emotionally Abused: Not on file    Physically Abused: Not on file    Sexually Abused: Not on file   Housing Stability:     Unable to Pay for Housing in the Last Year: Not on file    Number of [place] in the Last Year: Not on file    Unstable Housing in the Last Year: Not on file         ALLERGIES: Patient has no known allergies. Review of Systems   Constitutional: Negative for chills and fever. HENT: Negative for congestion, nosebleeds and sore throat. Eyes: Negative for pain and discharge. Respiratory: Positive for shortness of breath. Negative for cough. Cardiovascular: Negative for chest pain and palpitations. Gastrointestinal: Negative for abdominal pain, constipation, nausea and vomiting. Genitourinary: Negative for decreased urine volume, dysuria, flank pain and urgency. Musculoskeletal: Negative for gait problem and myalgias. Skin: Negative for rash and wound. Neurological: Negative for seizures and syncope. Hematological: Does not bruise/bleed easily. Psychiatric/Behavioral: Negative for confusion, self-injury and suicidal ideas. Vitals:    11/09/21 0216   BP: 132/86   Pulse: 98   Resp: 20   Temp: 97.6 °F (36.4 °C)   SpO2: 99%            Physical Exam  Vitals and nursing note reviewed. Constitutional:       Appearance: He is well-developed. HENT:      Head: Normocephalic and atraumatic. Eyes:      Pupils: Pupils are equal, round, and reactive to light. Cardiovascular:      Rate and Rhythm: Normal rate and regular rhythm. Heart sounds: Normal heart sounds. Pulmonary:      Effort: Pulmonary effort is normal. No respiratory distress. Breath sounds: Normal breath sounds. No wheezing. Abdominal:      General: Bowel sounds are normal.      Palpations: Abdomen is soft. Tenderness: There is no abdominal tenderness. There is no guarding or rebound. Musculoskeletal:         General: Normal range of motion. Cervical back: Normal range of motion and neck supple. Skin:     General: Skin is warm and dry. Neurological:      Mental Status: He is alert and oriented to person, place, and time. Psychiatric:         Behavior: Behavior normal.          MDM  Number of Diagnoses or Management Options  Diagnosis management comments: 79-year-old male with history of COPD presents with complaints of shortness of breath starting yesterday morning. Patient is well-appearing, no acute distress, hemodynamically stable, no respiratory distress, speaking complete sentences, clear to auscultation bilaterally, 100% on 1 L nasal cannula, no leg swelling. Plan-EKG, cardiac monitor, chest x-ray, CBC/CMP/cardiac enzymes. Labs remarkable for potassium 2.8, CO2 19  CT abdomen pelvis with no acute process. Amount and/or Complexity of Data Reviewed  Clinical lab tests: ordered and reviewed  Tests in the radiology section of CPT®: ordered and reviewed  Independent visualization of images, tracings, or specimens: yes    Patient Progress  Patient progress: improved         Procedures        ED EKG interpretation:  Rhythm: sinus tachycardia; and regular . Rate (approx.): 105; Axis: normal; P wave: normal; QRS interval: normal ; ST/T wave: non-specific changes; in  Lead: V2 , V3  and V4 ; Other findings: unchanged from previous ekg, no ischemia. This EKG was interpreted by Nayeli Person MD,ED Provider. 3:42 AM  C/o abd bloating and constipation in ER now. No resp distress. Speaking well, CTAB.         4:45 AM  Discussed CT and lab results with patient. Will give additional IV fluid hydration for rehydration purposes and recheck BMP.        4:58 AM  Vomiting, . tremors. Concern for etoh withdraw. Pt reports last drink yesterday morning. Total critical care time spent exclusive of procedures:  60min    5:27 AM  . Perfect Serve Consult for Admission  5:28 AM    ED Room Number: SER10/10  Patient Name and age: Jarad Castillo 70 y.o.  male  Working Diagnosis:   1. Alcohol withdrawal syndrome without complication (HCC)    2. Dehydration    3. Constipation, unspecified constipation type    4. Chronic obstructive pulmonary disease, unspecified COPD type ([facility] [address]    5.  Hypokalemia        COVID-19 Suspicion:  no  Sepsis present:  no  Reassessment needed: no  Code Status:  Full Code  Readmission: yes  Isolation Requirements:  no  Recommended Level of Care:  telemetry  Department:[place] [facility] - [phone]

## 2021-11-09 NOTE — ED NOTES
Pt yelling again and pulled off monitor and stated he was going to restroom. Dr Christa Guerrero at bedside again. Pt assisted to restroom with assistance.

## 2021-11-09 NOTE — ED NOTES
Verbal report and admission paperwork given to [facility] providers; time allotted for questions but denied having any at this time. VSS. Pt alert and oriented. Pt transported out of ED via [facility] stretcher to [facility].

## 2021-11-09 NOTE — CONSULTS
Pulmonary, Critical Care, and Sleep Medicine~Consult Note    Name: Leola Hilario MRN: [id number]   : 1950 Hospital: [address]   Date: 2021 10:27 AM Admission: 2021     Impression Plan   1. AE of Asthma; he is a never smoker  2. Hx of PE, eliquis was stopped due to hematemesis (noted on EGD 10/25/21 of esophagitis) on recent admission   3. EtOH abuse  4. It sounds like he is followed by GI at [facility] 1. Move steroids to PO  2. He states that he is already feeling better and he is now on room air. No signs of active infection. Would consider discharge and quick follow up with [facility] pulmonary medicine within the next week  3. O2 titration above 90%. On room air  4. duonebs  5. On DVT proph  6. Fully COVID19 vaccinated     7. We will be available again to see if needed      Daily Progression:    Consult Note requested by hospitalist.    Patient reports 2-3 days of cough, wheeze, dyspnea. Afebrile and no sick contacts. Chest x-ray on 21 did not show any evolving infiltrates. No WBC; he is afebrile. He is a known asthmatic that is followed at [facility]. On ELVIN and Singulair alone; he has more issues with allergies than asthma. He is a never smoker, so doubt COPD. He was recently hospitalized at the end of October for Hematemesis. He was on eliquis for a prior subsegmental PE; it was discontinued following CT imaging and dopplers not seeing additional clots. He is a heavy drinker, mostly drinks Vodka and his last drink was two days ago. Notable tremors, but answers questions appropriately at this time. Notable N/V. I have reviewed the labs and previous days notes. A comprehensive review of systems was negative. Past Medical History:   Diagnosis Date    Chronic obstructive pulmonary disease ([address]       History reviewed. No pertinent surgical history. Prior to Admission medications    Medication Sig Start Date End Date Taking?  Authorizing Provider polyethylene glycol (Miralax) 17 gram/dose powder Take 17 g by mouth daily. 1 tablespoon with 8 oz of water daily 11/9/21  Yes Jenna [place] MD   predniSONE (STERAPRED DS) 10 mg dose pack Take as directed on package 11/9/21  Yes Jenna [place] MD   albuterol (PROVENTIL HFA, VENTOLIN HFA, PROAIR HFA) 90 mcg/actuation inhaler Take 2 Puffs by inhalation. 10/6/21  Yes Other, MD Janine   pantoprazole (Protonix) 40 mg tablet Take 1 Tablet by mouth two (2) times a day. Patient not taking: Reported on 11/9/2021 10/26/21   Melvina Aponte MD   sucralfate (Carafate) 1 gram tablet Take 1 Tablet by mouth four (4) times daily for 14 days. Patient not taking: Reported on 11/9/2021 10/26/21 11/9/21  Melvina Aponte MD   LORazepam (Ativan) 0.5 mg tablet Take 1 tab BID X 2 days, then 1 tab daily X 2  Patient not taking: Reported on 11/9/2021 10/26/21   Melvina Aponte MD   Advair HFA 95-62 mcg/actuation inhaler Take 2 Puffs by inhalation two (2) times a day. Patient not taking: Reported on 11/9/2021 10/15/21   Josue, MD Janine   LORazepam (ATIVAN) 1 mg tablet Take 0.5 mg by mouth every eight (8) hours as needed. Patient not taking: Reported on 11/9/2021 9/27/21   Other, MD Janine   ondansetron hcl (ZOFRAN) 4 mg tablet 4 mg every eight (8) hours as needed. Patient not taking: Reported on 11/9/2021 9/27/21   Josue, MD Janine   budesonide (PULMICORT) 0.5 mg/2 mL nbsp 500 mcg by Nebulization route two (2) times a day. Patient not taking: Reported on 11/9/2021    Josue, MD Janine     No Known Allergies   Social History     Tobacco Use    Smoking status: Never Smoker    Smokeless tobacco: Never Used   Substance Use Topics    Alcohol use: Yes     Comment: Daily drinker; 4oz       History reviewed. No pertinent family history.   OBJECTIVE:     Vital Signs:       Visit Vitals  /89 (BP Patient Position: At rest)   Pulse (!) 106   Temp 98.6 °F (37 °C)   Resp 18   Ht 5' 7\" (1.702 m)   Wt 76 kg (167 lb 8.8 oz) SpO2 99%   BMI 26.24 kg/m²      Temp (24hrs), Av.4 °F (36.9 °C), Min:97.6 °F (36.4 °C), Max:98.8 °F (37.1 °C)     Intake/Output:     Last shift: No intake/output data recorded. Last 3 shifts: No intake/output data recorded. No intake or output data in the 24 hours ending 21 1027    Physical Exam:                                        Exam Findings Other   General: No resp distress noted, appears stated age    [de-identified]:  No ulcers, JVD not elevated, no cervical LAD    Chest: No pectus deformity, normal chest rise b/l    HEART:  RRR, no murmurs/rubs/gallops    Lungs:  CTA b/l, no rhonchi/crackles/wheeze, diminished BS at bases    ABD: Soft/NT, non rigid mildly distended    EXT: No cyanosis/clubbing/edema, normal peripheral pulses    Skin: No rashes or ulcers, no mottling    Neuro: A/O x 3        Medications:  Current Facility-Administered Medications   Medication Dose Route Frequency    sodium chloride (NS) flush 5-40 mL  5-40 mL IntraVENous Q8H    sodium chloride (NS) flush 5-40 mL  5-40 mL IntraVENous PRN    acetaminophen (TYLENOL) tablet 650 mg  650 mg Oral Q6H PRN    Or    acetaminophen (TYLENOL) suppository 650 mg  650 mg Rectal Q6H PRN    polyethylene glycol (MIRALAX) packet 17 g  17 g Oral DAILY PRN    ondansetron (ZOFRAN ODT) tablet 4 mg  4 mg Oral Q8H PRN    Or    ondansetron (ZOFRAN) injection 4 mg  4 mg IntraVENous Q6H PRN    enoxaparin (LOVENOX) injection 40 mg  40 mg SubCUTAneous DAILY    potassium chloride SR (KLOR-CON 10) tablet 20 mEq  20 mEq Oral Q1H    predniSONE (DELTASONE) tablet 40 mg  40 mg Oral DAILY WITH BREAKFAST       Labs:  ABG No results for input(s): PHI, PCO2I, PO2I, HCO3I, SO2I, FIO2I in the last 72 hours.      CBC Recent Labs     21  0230   WBC 4.1   HGB 11.2*   HCT 33.2*      MCV 91.2   MCH 12.6        Metabolic  Panel Recent Labs     21  0602 21  0230    141   K 2.7* 2.8*    100   CO2 21 19*   * 124*   BUN 6 6 CREA 0.96 0.97   CA 7.5* 8.6   MG  --  1.2*   ALB  --  3.0*   ALT  --  53        Pertinent Labs                Gonzalez Harris PA-C  11/9/2021

## 2021-11-09 NOTE — H&P
[address] [facility] Adult  Hospitalist Group  History and Physical    Primary Care Provider: Donis Lawton MD  Date of Service:  11/9/2021    Chief Complaint: sob    Subjective: Tj Johnson is a 70 y.o. male past medical history of asthma, history of PE complicated by hematemesis for which Eliquis was held, EtOH abuse, chronic essential tremor presents with primary complaint of shortness of breath. [facility] [place] [facility] States he presented to take his albuterol inhaler multiple times without relief. Denies fever chills, cough, chest pain. States he is a chronic drinker of vodka. States he drinks anywhere from two 2 ounce glasses a day to 4-6 on weekends although seemed rather reluctant to tell me the true amount he takes. States he \"thinks\" his last drink may have been yesterday. Denies use of recreational drugs. States he still currently feels short of breath. Vaccinated for Covid. When I evaluated patient, he was tachycardic heart rate 115's, blood pressure stable, saturating well on RA. Received 1 mg of Ativan . ethanol elevated to 168. Labs notable for hypokalemia with a K of 2.7. Mag 1.2. CXR showed no acute process. Review of Systems:    A comprehensive review of systems was negative except for that written in the History of Present Illness. Past Medical History:   Diagnosis Date    Chronic obstructive pulmonary disease ([facility] [address]       History reviewed. No pertinent surgical history. Prior to Admission medications    Medication Sig Start Date End Date Taking? Authorizing Provider   polyethylene glycol (Miralax) 17 gram/dose powder Take 17 g by mouth daily. 1 tablespoon with 8 oz of water daily 11/9/21  Yes Ashley Jaimes MD   predniSONE (STERAPRED DS) 10 mg dose pack Take as directed on package 11/9/21  Yes Ashley Jaimes MD   albuterol (PROVENTIL HFA, VENTOLIN HFA, PROAIR HFA) 90 mcg/actuation inhaler Take 2 Puffs by inhalation.  10/6/21  Yes Josue, MD Janine pantoprazole (Protonix) 40 mg tablet Take 1 Tablet by mouth two (2) times a day. Patient not taking: Reported on 11/9/2021 10/26/21   Jessica Diaz MD   sucralfate (Carafate) 1 gram tablet Take 1 Tablet by mouth four (4) times daily for 14 days. Patient not taking: Reported on 11/9/2021 10/26/21 11/9/21  Jessica Diaz MD   LORazepam (Ativan) 0.5 mg tablet Take 1 tab BID X 2 days, then 1 tab daily X 2  Patient not taking: Reported on 11/9/2021 10/26/21   Jessica Diaz MD   Advair HFA 94-45 mcg/actuation inhaler Take 2 Puffs by inhalation two (2) times a day. Patient not taking: Reported on 11/9/2021 10/15/21   Janine Salas MD   LORazepam (ATIVAN) 1 mg tablet Take 0.5 mg by mouth every eight (8) hours as needed. Patient not taking: Reported on 11/9/2021 9/27/21   Josue, MD Janine   ondansetron hcl (ZOFRAN) 4 mg tablet 4 mg every eight (8) hours as needed. Patient not taking: Reported on 11/9/2021 9/27/21   Josue, MD Janine   budesonide (PULMICORT) 0.5 mg/2 mL nbsp 500 mcg by Nebulization route two (2) times a day. Patient not taking: Reported on 11/9/2021    Josue, MD Janine     No Known Allergies   History reviewed. No pertinent family history. SOCIAL HISTORY:  Patient resides at Home. Patient ambulates with no assist.   Smoking history: never  Alcohol history: 2 Mixed drinks- 4-6 on weekends        Objective:       Physical Exam:   Visit Vitals  /82 (BP 1 Location: Right upper arm, BP Patient Position: Sitting)   Pulse 96   Temp 99 °F (37.2 °C)   Resp 19   Ht 5' 7\" (1.702 m)   Wt 76 kg (167 lb 8.8 oz)   SpO2 98%   BMI 26.24 kg/m²     General appearance: alert, cooperative, no distress, appears stated age, resting tremor  Head: Normocephalic, without obvious abnormality, atraumatic  Lungs: clear to auscultation bilaterally  Heart: Tachycardic, regular  Abdomen: soft, non-tender.  Bowel sounds normal. No masses,  no organomegaly  Extremities: extremities normal, atraumatic, no cyanosis or edema  Pulses: 2+ and symmetric  Skin: Skin color, texture, turgor normal. No rashes or lesions  Neurologic: Grossly normal  Cap refill: Brisk, less than 3 seconds  Pulses: 2+, symmetric in all extremities  Skin: Warm, dry, without rashes or lesions    ECG: Sinus tachycardia, PACs, no acute ischemic changes    Data Review: All diagnostic labs and studies have been reviewed. Chest x-ray was negative for infiltrate, effusion, pneumothorax, or wide mediastinum. Assessment + Plan     Acute alcohol withdrawal  Patient is tachycardic, tremulous CIWA elevated at 9 on my exam, last drink he believes was yesterday afternoon  -IV fluids, folate thiamine, discussed with pharmacy shortage of banana bag, vitamins will order PO as it will not be present in fluids  -Counseled on abstinence  CIWA protocol Ativan as needed  -Monitor for DTs    Asthma exacerbation? ?  -now stable on room air,  was placed on 2 L but no documentation of desatting, chest x-ray appears normal  Pulm following, transition to prednisone p.o. > can do 5 day course   -can be d.c'd c and f/u with pulmonary patient [facility] pul medicine  -Counseled on being compliant with asthma medications  -screen for flu and covid  -Continue duo nebs, Pulmicort, can resume home doses on d/c    Hypokalemia, hypomagnesemia  Repleted as needed  Follow a.m. BMP, Mg    History of PE complicated by developing hematemesis for which Eliquis was held  -Currently stable on room air saturating well    EtOH abuse  Counseled on abstaining  Obtain urine drug screen    Diet: Regular   DVT PPx: Lovenox subcu  Code: Full     Dispo: tentatively d/c 24-48 hrs depending on clinical course       FUNCTIONAL STATUS PRIOR TO HOSPITALIZATION Ambulates Independently       Signed By: Chaz Sharma MD     November 9, 2021

## 2021-11-09 NOTE — ED NOTES
Bedside and Verbal shift change report given to Jose G Cherry RN  (oncoming nurse) by Brendan Multani RN  (offgoing nurse). Report included the following information SBAR, Kardex, ED Summary and Intake/Output.

## 2021-11-09 NOTE — ED NOTES
Pt yelling he was going to the restroom. Tried assisted patient after he pulled off leads and oxygen. Pt in restroom. Dr Gretel Alegre aware.

## 2021-11-10 ENCOUNTER — APPOINTMENT (OUTPATIENT)
Dept: CT IMAGING | Age: 71
DRG: 897 | End: 2021-11-10
Attending: INTERNAL MEDICINE
Payer: MEDICARE

## 2021-11-10 LAB
ANION GAP SERPL CALC-SCNC: 7 MMOL/L (ref 5–15)
BASOPHILS # BLD: 0 K/UL (ref 0–0.1)
BASOPHILS NFR BLD: 0 % (ref 0–1)
BUN SERPL-MCNC: 5 MG/DL (ref 6–20)
BUN/CREAT SERPL: 6 (ref 12–20)
CALCIUM SERPL-MCNC: 8.5 MG/DL (ref 8.5–10.1)
CHLORIDE SERPL-SCNC: 107 MMOL/L (ref 97–108)
CO2 SERPL-SCNC: 22 MMOL/L (ref 21–32)
CREAT SERPL-MCNC: 0.9 MG/DL (ref 0.7–1.3)
DIFFERENTIAL METHOD BLD: ABNORMAL
EOSINOPHIL # BLD: 0 K/UL (ref 0–0.4)
EOSINOPHIL NFR BLD: 0 % (ref 0–7)
ERYTHROCYTE [DISTWIDTH] IN BLOOD BY AUTOMATED COUNT: 14.6 % (ref 11.5–14.5)
GLUCOSE SERPL-MCNC: 165 MG/DL (ref 65–100)
HCT VFR BLD AUTO: 26.8 % (ref 36.6–50.3)
HGB BLD-MCNC: 8.8 G/DL (ref 12.1–17)
IMM GRANULOCYTES # BLD AUTO: 0 K/UL (ref 0–0.04)
IMM GRANULOCYTES NFR BLD AUTO: 1 % (ref 0–0.5)
LYMPHOCYTES # BLD: 0.7 K/UL (ref 0.8–3.5)
LYMPHOCYTES NFR BLD: 19 % (ref 12–49)
MAGNESIUM SERPL-MCNC: 2.1 MG/DL (ref 1.6–2.4)
MCH RBC QN AUTO: 31 PG (ref 26–34)
MCHC RBC AUTO-ENTMCNC: 32.8 G/DL (ref 30–36.5)
MCV RBC AUTO: 94.4 FL (ref 80–99)
MONOCYTES # BLD: 0.4 K/UL (ref 0–1)
MONOCYTES NFR BLD: 10 % (ref 5–13)
NEUTS SEG # BLD: 2.4 K/UL (ref 1.8–8)
NEUTS SEG NFR BLD: 70 % (ref 32–75)
NRBC # BLD: 0 K/UL (ref 0–0.01)
NRBC BLD-RTO: 0 PER 100 WBC
PLATELET # BLD AUTO: 124 K/UL (ref 150–400)
PMV BLD AUTO: 9.5 FL (ref 8.9–12.9)
POTASSIUM SERPL-SCNC: 3.5 MMOL/L (ref 3.5–5.1)
RBC # BLD AUTO: 2.84 M/UL (ref 4.1–5.7)
RBC MORPH BLD: ABNORMAL
SODIUM SERPL-SCNC: 136 MMOL/L (ref 136–145)
WBC # BLD AUTO: 3.5 K/UL (ref 4.1–11.1)

## 2021-11-10 PROCEDURE — 85025 COMPLETE CBC W/AUTO DIFF WBC: CPT

## 2021-11-10 PROCEDURE — 96376 TX/PRO/DX INJ SAME DRUG ADON: CPT

## 2021-11-10 PROCEDURE — 74011000250 HC RX REV CODE- 250: Performed by: STUDENT IN AN ORGANIZED HEALTH CARE EDUCATION/TRAINING PROGRAM

## 2021-11-10 PROCEDURE — 83735 ASSAY OF MAGNESIUM: CPT

## 2021-11-10 PROCEDURE — 36415 COLL VENOUS BLD VENIPUNCTURE: CPT

## 2021-11-10 PROCEDURE — 97530 THERAPEUTIC ACTIVITIES: CPT

## 2021-11-10 PROCEDURE — A9270 NON-COVERED ITEM OR SERVICE: HCPCS | Performed by: PHYSICIAN ASSISTANT

## 2021-11-10 PROCEDURE — 70450 CT HEAD/BRAIN W/O DYE: CPT

## 2021-11-10 PROCEDURE — 94640 AIRWAY INHALATION TREATMENT: CPT

## 2021-11-10 PROCEDURE — 77010033678 HC OXYGEN DAILY

## 2021-11-10 PROCEDURE — 74011250636 HC RX REV CODE- 250/636: Performed by: STUDENT IN AN ORGANIZED HEALTH CARE EDUCATION/TRAINING PROGRAM

## 2021-11-10 PROCEDURE — 80048 BASIC METABOLIC PNL TOTAL CA: CPT

## 2021-11-10 PROCEDURE — G0378 HOSPITAL OBSERVATION PER HR: HCPCS

## 2021-11-10 PROCEDURE — 96372 THER/PROPH/DIAG INJ SC/IM: CPT

## 2021-11-10 PROCEDURE — 74011636637 HC RX REV CODE- 636/637: Performed by: PHYSICIAN ASSISTANT

## 2021-11-10 PROCEDURE — 74011250637 HC RX REV CODE- 250/637: Performed by: STUDENT IN AN ORGANIZED HEALTH CARE EDUCATION/TRAINING PROGRAM

## 2021-11-10 PROCEDURE — 97161 PT EVAL LOW COMPLEX 20 MIN: CPT

## 2021-11-10 RX ORDER — CHLORDIAZEPOXIDE HYDROCHLORIDE 25 MG/1
50 CAPSULE, GELATIN COATED ORAL EVERY 12 HOURS
Status: DISCONTINUED | OUTPATIENT
Start: 2021-11-10 | End: 2021-11-15

## 2021-11-10 RX ORDER — ALBUTEROL SULFATE 0.83 MG/ML
2.5 SOLUTION RESPIRATORY (INHALATION)
Status: DISCONTINUED | OUTPATIENT
Start: 2021-11-10 | End: 2021-11-19 | Stop reason: HOSPADM

## 2021-11-10 RX ORDER — BALSAM PERU/CASTOR OIL
OINTMENT (GRAM) TOPICAL EVERY 8 HOURS
Status: CANCELLED | OUTPATIENT
Start: 2021-11-10

## 2021-11-10 RX ORDER — IPRATROPIUM BROMIDE AND ALBUTEROL SULFATE 2.5; .5 MG/3ML; MG/3ML
3 SOLUTION RESPIRATORY (INHALATION)
Status: DISCONTINUED | OUTPATIENT
Start: 2021-11-11 | End: 2021-11-11

## 2021-11-10 RX ORDER — POTASSIUM CHLORIDE 750 MG/1
40 TABLET, FILM COATED, EXTENDED RELEASE ORAL
Status: COMPLETED | OUTPATIENT
Start: 2021-11-10 | End: 2021-11-10

## 2021-11-10 RX ORDER — HALOPERIDOL 5 MG/ML
5 INJECTION INTRAMUSCULAR ONCE
Status: COMPLETED | OUTPATIENT
Start: 2021-11-10 | End: 2021-11-10

## 2021-11-10 RX ADMIN — SODIUM CHLORIDE 125 ML/HR: 9 INJECTION, SOLUTION INTRAVENOUS at 23:36

## 2021-11-10 RX ADMIN — CHLORDIAZEPOXIDE HYDROCHLORIDE 50 MG: 25 CAPSULE ORAL at 23:45

## 2021-11-10 RX ADMIN — LORAZEPAM 4 MG: 2 INJECTION INTRAMUSCULAR; INTRAVENOUS at 19:30

## 2021-11-10 RX ADMIN — Medication 10 ML: at 13:48

## 2021-11-10 RX ADMIN — ARFORMOTEROL TARTRATE 15 MCG: 15 SOLUTION RESPIRATORY (INHALATION) at 10:26

## 2021-11-10 RX ADMIN — Medication 100 MG: at 09:25

## 2021-11-10 RX ADMIN — IPRATROPIUM BROMIDE AND ALBUTEROL SULFATE 3 ML: .5; 3 SOLUTION RESPIRATORY (INHALATION) at 20:24

## 2021-11-10 RX ADMIN — POTASSIUM CHLORIDE 40 MEQ: 750 TABLET, FILM COATED, EXTENDED RELEASE ORAL at 09:25

## 2021-11-10 RX ADMIN — MULTIPLE VITAMINS W/ MINERALS TAB 1 TABLET: TAB at 09:25

## 2021-11-10 RX ADMIN — BUDESONIDE 500 MCG: 0.5 INHALANT RESPIRATORY (INHALATION) at 20:24

## 2021-11-10 RX ADMIN — CHLORDIAZEPOXIDE HYDROCHLORIDE 50 MG: 25 CAPSULE ORAL at 11:54

## 2021-11-10 RX ADMIN — IPRATROPIUM BROMIDE AND ALBUTEROL SULFATE 3 ML: .5; 3 SOLUTION RESPIRATORY (INHALATION) at 10:26

## 2021-11-10 RX ADMIN — ENOXAPARIN SODIUM 40 MG: 100 INJECTION SUBCUTANEOUS at 09:25

## 2021-11-10 RX ADMIN — FOLIC ACID 1 MG: 1 TABLET ORAL at 09:25

## 2021-11-10 RX ADMIN — IPRATROPIUM BROMIDE AND ALBUTEROL SULFATE 3 ML: .5; 3 SOLUTION RESPIRATORY (INHALATION) at 16:06

## 2021-11-10 RX ADMIN — ARFORMOTEROL TARTRATE 15 MCG: 15 SOLUTION RESPIRATORY (INHALATION) at 20:25

## 2021-11-10 RX ADMIN — SODIUM CHLORIDE 125 ML/HR: 9 INJECTION, SOLUTION INTRAVENOUS at 06:17

## 2021-11-10 RX ADMIN — PREDNISONE 40 MG: 20 TABLET ORAL at 09:25

## 2021-11-10 RX ADMIN — Medication 10 ML: at 23:06

## 2021-11-10 RX ADMIN — BUDESONIDE 500 MCG: 0.5 INHALANT RESPIRATORY (INHALATION) at 10:26

## 2021-11-10 RX ADMIN — Medication 10 ML: at 06:07

## 2021-11-10 RX ADMIN — LORAZEPAM 4 MG: 2 INJECTION INTRAMUSCULAR; INTRAVENOUS at 23:04

## 2021-11-10 RX ADMIN — LORAZEPAM 4 MG: 2 INJECTION INTRAMUSCULAR; INTRAVENOUS at 17:12

## 2021-11-10 RX ADMIN — LORAZEPAM 4 MG: 2 INJECTION INTRAMUSCULAR; INTRAVENOUS at 09:43

## 2021-11-10 RX ADMIN — SODIUM CHLORIDE 125 ML/HR: 9 INJECTION, SOLUTION INTRAVENOUS at 13:47

## 2021-11-10 RX ADMIN — LORAZEPAM 4 MG: 2 INJECTION INTRAMUSCULAR; INTRAVENOUS at 12:48

## 2021-11-10 RX ADMIN — LORAZEPAM 4 MG: 2 INJECTION INTRAMUSCULAR; INTRAVENOUS at 01:31

## 2021-11-10 RX ADMIN — HALOPERIDOL LACTATE 5 MG: 5 INJECTION, SOLUTION INTRAMUSCULAR at 02:18

## 2021-11-10 RX ADMIN — LORAZEPAM 2 MG: 2 INJECTION INTRAMUSCULAR; INTRAVENOUS at 04:05

## 2021-11-10 RX ADMIN — LORAZEPAM 4 MG: 2 INJECTION INTRAMUSCULAR; INTRAVENOUS at 16:02

## 2021-11-10 RX ADMIN — LORAZEPAM 2 MG: 2 INJECTION INTRAMUSCULAR; INTRAVENOUS at 00:29

## 2021-11-10 NOTE — PROGRESS NOTES
11/09/21 2003   Pal Fall Risk   Mobility 1.0   Mobility Interventions Bed/chair exit alarm; Communicate number of staff needed for ambulation/transfer; Patient to call before getting OOB; PT Consult for mobility concerns   Mentation 0   Medication 1   Medication Interventions Bed/chair exit alarm; Evaluate medications/consider consulting pharmacy; Patient to call before getting OOB; Teach patient to arise slowly   Elimination 1.0   Elimination Interventions Bed/chair exit alarm; Call light in reach; Patient to call for help with toileting needs; Toileting schedule/hourly rounds   Prior Fall History 1   History of Falls Interventions Bed/chair exit alarm;  Door open when patient unattended; Room close to nurse's station; Vital signs minimum Q4HRs X 24 hrs (comment for end date)   Total Score 4   Standard Fall Precautions Yes   High Fall Risk Yes     Pt is high fall risk, please coordinate with PT for six minute walk test.

## 2021-11-10 NOTE — PROGRESS NOTES
[address] [facility] Adult  Hospitalist Group                                                                                          Hospitalist Progress Note  Antelmo Goff MD  Answering service: [phone] from in house phone        Date of Service:  11/10/2021  NAME:  Carrillo Ceron  :  1950  MRN:  [id number]      Admission Summary:   Carrillo Ceron is a 70 y.o. male past medical history of asthma, history of PE complicated by hematemesis for which Eliquis was held, EtOH abuse, chronic essential tremor presents with primary complaint of shortness of breath. [facility] [place] [facility]. States he presented to take his albuterol inhaler multiple times without relief. Denies fever chills, cough, chest pain. States he is a chronic drinker of vodka. States he drinks anywhere from two 2 ounce glasses a day to 4-6 on weekends although seemed rather reluctant to tell me the true amount he takes. States he \"thinks\" his last drink may have been yesterday. Denies use of recreational drugs. States he still currently feels short of breath. Vaccinated for Covid.      When I evaluated patient, he was tachycardic heart rate 115's, blood pressure stable, saturating well on RA. Received 1 mg of Ativan . ethanol elevated to 168. Labs notable for hypokalemia with a K of 2.7. Mag 1.2. CXR showed no acute process. Interval history / Subjective:    Patient seen and examined at bedside. Overnight patient was restless, agitated given Haldol and 4 mg Ativan. This morning patient was sitting up eating his breakfast resting tremors at baseline.  Still slightly tachy     Assessment & Plan:     Acute alcohol withdrawal  Patient is tachycardic, CIWA 14, 12 this am, last night unclear if he had episode of delirium as his resting tremor makes it difficult to obtain accurate CIWA but patient still tachy  -IV fluids, folate thiamine  -Counseled on abstinence  CIWA protocol Ativan as needed, still required large amount overnight, add on librium   -Monitor for DTs     Asthma exacerbation? ?  -now stable on room air,  was placed on 2 L but no documentation of desatting, chest x-ray appears normal  Pulm following, transition to prednisone p.o. > can do 5 day course   -can be d.c'd c and f/u with pulmonary patient [facility] pul medicine from pul standpt  -Counseled on being compliant with asthma medications  -flu and covid neg  -Continue duo nebs, Pulmicort, can resume home doses on d/c  -Walking desat to confirm if patient needs O2     Hypokalemia, hypomagnesemia  Repleted as needed  Follow a.m. BMP, Mg     History of PE complicated by developing hematemesis for which Eliquis was held  -Currently stable on room air saturating well     EtOH abuse  Counseled on abstaining  Obtain urine drug screen     Diet: Regular   DVT PPx: Lovenox subcu  Code: Full        Care Plan discussed with: Patient/Family and Nurse  Anticipated Disposition: Home w/Family  Anticipated Discharge: 24 hours to 48 hours     Hospital Problems  Date Reviewed: 10/25/2021          Codes Class Noted POA    Alcohol withdrawal ([facility] ICD-10-CM: N44.735  ICD-9-CM: 291.81  11/9/2021 Unknown        COPD (chronic obstructive pulmonary disease) ([facility] ICD-10-CM: J44.9  ICD-9-CM: 403  11/9/2021 Unknown        COPD exacerbation ([facility] ICD-10-CM: J44.1  ICD-9-CM: 491.21  11/9/2021 Unknown                Review of Systems:   A comprehensive review of systems was negative except for that written in the HPI. Vital Signs:    Last 24hrs VS reviewed since prior progress note.  Most recent are:  Visit Vitals  /77 (BP 1 Location: Left upper arm, BP Patient Position: At rest)   Pulse 74   Temp 97.4 °F (36.3 °C)   Resp 20   Ht 5' 7\" (1.702 m)   Wt 72.8 kg (160 lb 7.9 oz)   SpO2 100%   BMI 25.14 kg/m²         Intake/Output Summary (Last 24 hours) at 11/10/2021 0933  Last data filed at 11/10/2021 0602  Gross per 24 hour   Intake 2132.5 ml   Output 1400 ml   Net 732.5 ml        Physical Examination:     I had a face to face encounter with this patient and independently examined them on 11/10/2021 as outlined below:          Constitutional:  No acute distress, cooperative, pleasant resting tremor   ENT:  Oral mucosa moist, oropharynx benign. Resp:  CTA bilaterally. No wheezing/rhonchi/rales. No accessory muscle use   CV:  tachy, normal rate, no murmurs, gallops, rubs    GI:  Soft, non distended, non tender. normoactive bowel sounds, no hepatosplenomegaly     Musculoskeletal:  No edema, warm, 2+ pulses throughout    Neurologic:  Moves all extremities. AAOx3, CN II-XII reviewed            Data Review:    Review and/or order of clinical lab test  Review and/or order of tests in the radiology section of CPT  Review and/or order of tests in the medicine section of CPT      Labs:     Recent Labs     11/10/21  0245 11/09/21  0230   WBC 3.5* 4.1   HGB 8.8* 11.2*   HCT 26.8* 33.2*   * 188     Recent Labs     11/10/21  0245 11/09/21  0602 11/09/21  0500 11/09/21  0230    142  --  141   K 3.5 2.7*  --  2.8*    103  --  100   CO2 22 21  --  19*   BUN 5* 6  --  6   CREA 0.90 0.96  --  0.97   * 155*  --  124*   CA 8.5 7.5*  --  8.6   MG 2.1  --  1.3* 1.2*     Recent Labs     11/09/21  0230   ALT 53   *   TBILI 0.9   TP 6.8   ALB 3.0*   GLOB 3.8     No results for input(s): INR, PTP, APTT, INREXT in the last 72 hours. No results for input(s): FE, TIBC, PSAT, FERR in the last 72 hours. No results found for: FOL, RBCF   No results for input(s): PH, PCO2, PO2 in the last 72 hours. No results for input(s): CPK, CKNDX, TROIQ in the last 72 hours.     No lab exists for component: CPKMB  No results found for: CHOL, CHOLX, CHLST, CHOLV, HDL, HDLP, LDL, LDLC, DLDLP, TGLX, TRIGL, TRIGP, CHHD, CHHDX  Lab Results   Component Value Date/Time    Glucose (POC) 185 (H) 10/26/2021 09:00 AM    Glucose (POC) 152 (H) 10/25/2021 06:42 AM     No results found for: COLOR, APPRN, SPGRU, REFSG, RUSLAN, PROTU, GLUCU, KETU, BILU, UROU, ESTRELLA, LEUKU, GLUKE, EPSU, BACTU, WBCU, RBCU, CASTS, UCRY      Medications Reviewed:     Current Facility-Administered Medications   Medication Dose Route Frequency    sodium chloride (NS) flush 5-40 mL  5-40 mL IntraVENous Q8H    sodium chloride (NS) flush 5-40 mL  5-40 mL IntraVENous PRN    acetaminophen (TYLENOL) tablet 650 mg  650 mg Oral Q6H PRN    Or    acetaminophen (TYLENOL) suppository 650 mg  650 mg Rectal Q6H PRN    polyethylene glycol (MIRALAX) packet 17 g  17 g Oral DAILY PRN    ondansetron (ZOFRAN ODT) tablet 4 mg  4 mg Oral Q8H PRN    Or    ondansetron (ZOFRAN) injection 4 mg  4 mg IntraVENous Q6H PRN    enoxaparin (LOVENOX) injection 40 mg  40 mg SubCUTAneous DAILY    budesonide (PULMICORT) 500 mcg/2 ml nebulizer suspension  500 mcg Nebulization BID RT    albuterol-ipratropium (DUO-NEB) 2.5 MG-0.5 MG/3 ML  3 mL Nebulization Q4H RT    arformoteroL (BROVANA) neb solution 15 mcg  2 mL Nebulization BID RT    predniSONE (DELTASONE) tablet 40 mg  40 mg Oral DAILY WITH BREAKFAST    multivitamin, tx-iron-ca-min (THERA-M w/ IRON) tablet 1 Tablet  1 Tablet Oral DAILY    LORazepam (ATIVAN) injection 2 mg  2 mg IntraVENous Q1H PRN    LORazepam (ATIVAN) injection 4 mg  4 mg IntraVENous Q1H PRN    thiamine HCL (B-1) tablet 100 mg  100 mg Oral DAILY    folic acid (FOLVITE) tablet 1 mg  1 mg Oral DAILY    0.9% sodium chloride infusion  125 mL/hr IntraVENous CONTINUOUS    calcium carbonate (TUMS) chewable tablet 200 mg [elemental]  200 mg Oral PRN     ______________________________________________________________________  EXPECTED LENGTH OF STAY: 2d 9h  ACTUAL LENGTH OF STAY:          1                 Hammad Zaidi MD

## 2021-11-10 NOTE — PROGRESS NOTES
0200: Pt becoming extremely restless and agitated, trying to get up out of bed constantly, yelling at and pushing the staff with each attempt to redirect. 4mg of IV ativan was given at 0131 for a CIWA score of 14 and I (RN) have been sitting with the pt ever since as his agitation has only gotten worse since then. Charge RN Notified Helder Roa MD of pt's increasing agitation and restlessness. Orders received to start bilateral soft wrist restraints. 0204: Notified and educated pt's wife, [address], about initiation of bilateral wrist restraints and their use as a safety precaution. [address] stated that she understands and consents to this order. 0215: Dr. Elkin Fitzpatrick has come to assess the pt. Orders placed for 5mg of haldol IM.     9691: Haldol given.

## 2021-11-10 NOTE — WOUND CARE
Wound Care Note:     New consult placed by nurse request for scalp laceration    Chart shows:  Admitted for alcohol withdrawal, COPD and COPD exacerbation   Past Medical History:   Diagnosis Date    Chronic obstructive pulmonary disease ([facility] [address]      WBC = 3.5 on 11/10/21  Admitted from home    Assessment:   Patient is alert and talking, continent with some assistance needed in repositioning. Bed: [facility]  Diet: Adult diet regular  Patient reports no pain. Bilateral upper buttocks and sacral skin intact and without erythema. 1. POA two lacerations to top of head, one is very small, area measures 3 cm x 0.7 cm, wounds are crusted, no drainage, no erythema. Left open to air. 2.  POA bilateral heels with blanchable erythema. Venelex ointment to be ordered. Spoke with Dr. Araceli Patel, wound care orders obtained. Patient sitting on side of bed. Recommendations:    Sacrum and bilateral heels- Every 8 hours liberally apply Venelex ointment. Skin Care & Pressure Prevention:  Minimize layers of linen/pads under patient to optimize support surface. Turn/reposition approximately every 2 hours and offload heels.   Manage incontinence / promote continence   Nourishing Skin Cream to dry skin, minimize use of briefs when able    Discussed above plan with patient & ELBA Jones    Transition of Care: Plan to follow as needed while admitted to hospital.    MILAGRO Kessler, RN, [facility], [facility].  office [phone]  pager [phone] or call  to page

## 2021-11-10 NOTE — PROGRESS NOTES
Patient sleeping comfortably, eyes open to voice when placing BP cuff, returned to sleep. Lola 89 with wife on the phone, to give an update. Wife voiced concern about him coming home, saying that Mr. Sury Richardson says that coming into the house is a trigger for his drinking. When he is home he feels like he needs to drink. She stated that she has tried to hide the alcohol but he just goes out and gets more. She stated she is planning to come to the hospital around  this morning. 0912-patient sleeping. 3327- Patient sitting up, needed to use the urinal. Assisted to stand, 250mL output. Sitting on edge of bed to eat breakfast. Rajeev Matthews, PT in room.

## 2021-11-10 NOTE — PROGRESS NOTES
Problem: Mobility Impaired (Adult and Pediatric)  Goal: *Acute Goals and Plan of Care (Insert Text)  Description: FUNCTIONAL STATUS PRIOR TO ADMISSION: Patient required minimal assistance for basic and instrumental ADLs with pt reporting wife assists him. However, appears patient was still driving    [address]: The patient lived with wife but did not require assist.    Physical Therapy Goals  Initiated 11/10/2021  1. Patient will move from supine to sit and sit to supine , scoot up and down, and roll side to side in bed with supervision/set-up within 7 day(s). 2.  Patient will transfer from bed to chair and chair to bed with supervision/set-up using the least restrictive device within 7 day(s). 3.  Patient will perform sit to stand with supervision/set-up within 7 day(s). 4.  Patient will ambulate with supervision/set-up for 150 feet with the least restrictive device within 7 day(s). 5.  Patient will ascend/descend 4 stairs with 1 handrail(s) with minimal assistance/contact guard assist within 7 day(s). 11/10/2021 1022 by Melissa Kaur, PT  Outcome: Progressing Towards Goal  11/10/2021 1020 by Melissa Kaur, PT  Outcome: Progressing Towards Goal     PHYSICAL THERAPY EVALUATION  Patient: Lesvia Robbins (89 y.o. male)  Date: 11/10/2021  Primary Diagnosis: Alcohol withdrawal ([facility] [F10.239]  COPD (chronic obstructive pulmonary disease) ([facility] [J44.9]  COPD exacerbation ([facility] [J44.1]        Precautions: falls         ASSESSMENT  Based on the objective data described below, the patient presents with COPD and SOB, likely ETOH withdrawal, noted to have a high CIWA and confusion last night however calm today.  Pt limited by c/o dizziness upon standing with quick to fatigue, decreased insight into deficits, decreased safety with DME, decreased functional mobility/balance, decreased activity tolerance,  decreased strength, decreased coordination, cognitive deficits of safety awareness and judgement, A&O to self, location, partial situation but not date, impaired behavioral regulation last night, currently controlled and medicated. Pt tolerates sitting upright with difficulty managing self feeding d/t tremors. Pt assisted with feeding and cues given. Pt tolerates standing x2 with noted jump from 100s to 140s HR. Pt able to maintain O2 sats well sitting and standing on RA however unclear if pt will desaturate during gait, as unable to participate in gait at this time. Returned to supine. Will continue to follow. Current Level of Function Impacting Discharge (mobility/balance): Functional Outcome Measure: The patient scored Total: 40/100 on the Barthel Index which is indicative of high impaired ability to care for basic self needs/dependency on others. Other factors to consider for discharge:      Patient will benefit from skilled therapy intervention to address the above noted impairments. PLAN :  Recommendations and Planned Interventions: bed mobility training, transfer training, gait training, therapeutic exercises, neuromuscular re-education, patient and family training/education, and therapeutic activities      Frequency/Duration: Patient will be followed by physical therapy:  5 times a week to address goals. Recommendation for discharge: (in order for the patient to meet his/her long term goals)  Physical therapy at least 2 days/week in the home     This discharge recommendation:  Has been made in collaboration with the attending provider and/or case management    IF patient discharges home will need the following DME: rolling walker         SUBJECTIVE:   Patient stated the drinking helps me sleep, but I dont think it affects my shaking.     OBJECTIVE DATA SUMMARY:   HISTORY:    Past Medical History:   Diagnosis Date    Chronic obstructive pulmonary disease ([facility] [address] [address]    History reviewed. No pertinent surgical history.     Personal factors and/or comorbidities impacting plan of care:          EXAMINATION/PRESENTATION/DECISION MAKING:   Critical Behavior:  Neurologic State: Restless, Confused  Orientation Level: Oriented X4, Other (Comment) (sometimes disoriented to time)  Cognition: Follows commands, Impulsive, Poor safety awareness     Hearing: Auditory  Auditory Impairment: None  Skin:  intact  Edema: none  Range Of Motion:  AROM: Within functional limits           PROM: Within functional limits           Strength:    Strength: Generally decreased, functional                    Tone & Sensation:   Tone: Abnormal (resting tremor, intention tremor worsens)                              Coordination:  Coordination: Grossly decreased, non-functional  Vision:      Functional Mobility:  Bed Mobility:        Sit to Supine: Minimum assistance  Scooting: Minimum assistance  Transfers:  Sit to Stand: Minimum assistance  Stand to Sit: Minimum assistance                       Balance:   Sitting: Intact  Standing: Impaired; With support  Standing - Static: Fair  Standing - Dynamic : Fair; Poor; Constant support  Ambulation/Gait Training:              Gait Description (WDL):  (unable)             Functional Measure:  Barthel Index:    Bathin  Bladder: 5  Bowels: 10  Groomin  Dressin  Feedin  Mobility: 0  Stairs: 0  Toilet Use: 5  Transfer (Bed to Chair and Back): 10  Total: 40/100       The Barthel ADL Index: Guidelines  1. The index should be used as a record of what a patient does, not as a record of what a patient could do. 2. The main aim is to establish degree of independence from any help, physical or verbal, however minor and for whatever reason. 3. The need for supervision renders the patient not independent. 4. A patient's performance should be established using the best available evidence. Asking the patient, friends/relatives and nurses are the usual sources, but direct observation and common sense are also important.  However direct testing is not needed. 5. Usually the patient's performance over the preceding 24-48 hours is important, but occasionally longer periods will be relevant. 6. Middle categories imply that the patient supplies over 50 per cent of the effort. 7. Use of aids to be independent is allowed. Bryant Mercer., Barthel, D.W. (6767). Functional evaluation: the Barthel Index. [address] [facility] (14)2. LOUIE Lucero, Matt Bruno., Luisito Reed., Jevon, [facility] (1999). Measuring the change indisability after inpatient rehabilitation; comparison of the responsiveness of the Barthel Index and Functional [place] Measure. Journal of Neurology, Neurosurgery, and Psychiatry, 66(4), 432-805. Harry Bryson, N.J.A, TAL Leyva, & Isaura Burnham M.A. (2004.) Assessment of post-stroke quality of life in cost-effectiveness studies: The usefulness of the Barthel Index and the EuroQoL-5D. Quality of Life Research, 15, 820-06        Physical Therapy Evaluation Charge Determination   History Examination Presentation Decision-Making   HIGH Complexity :3+ comorbidities / personal factors will impact the outcome/ POC  HIGH Complexity : 4+ Standardized tests and measures addressing body structure, function, activity limitation and / or participation in recreation  LOW Complexity : Stable, uncomplicated  Other outcome measures barthel  HIGH       Based on the above components, the patient evaluation is determined to be of the following complexity level: LOW     Activity Tolerance:   Fair, SpO2 stable on RA, and tachycardia    After treatment patient left in no apparent distress:   Supine in bed and Call bell within reach    COMMUNICATION/EDUCATION:   The patients plan of care was discussed with: Registered nurse and Case management. Fall prevention education was provided and the patient/caregiver indicated understanding. and Patient/family have participated as able in goal setting and plan of care.     Thank you for this referral.  Jasen Coppola, PT   Time Calculation: 40 mins

## 2021-11-10 NOTE — PROGRESS NOTES
Physical Therapy     Pt currently asleep, RN requests no disturbing pt as he has a very rough night. Pt self-roused immediately after this note entered, PT eval performed. Pt currently unable to ambulate, tachy to 140, O2 appears stable on RA while standing however will require assessment during gait. Pt tremors are significant and complicate ADLs in sitting.      Isabel Brennan, DPT, PT

## 2021-11-11 LAB
ANION GAP SERPL CALC-SCNC: 4 MMOL/L (ref 5–15)
BASOPHILS # BLD: 0 K/UL (ref 0–0.1)
BASOPHILS NFR BLD: 0 % (ref 0–1)
BUN SERPL-MCNC: 6 MG/DL (ref 6–20)
BUN/CREAT SERPL: 9 (ref 12–20)
CALCIUM SERPL-MCNC: 8.4 MG/DL (ref 8.5–10.1)
CHLORIDE SERPL-SCNC: 114 MMOL/L (ref 97–108)
CO2 SERPL-SCNC: 23 MMOL/L (ref 21–32)
CREAT SERPL-MCNC: 0.7 MG/DL (ref 0.7–1.3)
DIFFERENTIAL METHOD BLD: ABNORMAL
EOSINOPHIL # BLD: 0 K/UL (ref 0–0.4)
EOSINOPHIL NFR BLD: 0 % (ref 0–7)
ERYTHROCYTE [DISTWIDTH] IN BLOOD BY AUTOMATED COUNT: 14.3 % (ref 11.5–14.5)
GLUCOSE SERPL-MCNC: 91 MG/DL (ref 65–100)
HCT VFR BLD AUTO: 27.9 % (ref 36.6–50.3)
HGB BLD-MCNC: 8.9 G/DL (ref 12.1–17)
IMM GRANULOCYTES # BLD AUTO: 0 K/UL (ref 0–0.04)
IMM GRANULOCYTES NFR BLD AUTO: 1 % (ref 0–0.5)
LYMPHOCYTES # BLD: 1.2 K/UL (ref 0.8–3.5)
LYMPHOCYTES NFR BLD: 30 % (ref 12–49)
MCH RBC QN AUTO: 31 PG (ref 26–34)
MCHC RBC AUTO-ENTMCNC: 31.9 G/DL (ref 30–36.5)
MCV RBC AUTO: 97.2 FL (ref 80–99)
MONOCYTES # BLD: 0.2 K/UL (ref 0–1)
MONOCYTES NFR BLD: 4 % (ref 5–13)
NEUTS SEG # BLD: 2.6 K/UL (ref 1.8–8)
NEUTS SEG NFR BLD: 65 % (ref 32–75)
NRBC # BLD: 0 K/UL (ref 0–0.01)
NRBC BLD-RTO: 0 PER 100 WBC
PLATELET # BLD AUTO: 109 K/UL (ref 150–400)
PMV BLD AUTO: 9.6 FL (ref 8.9–12.9)
POTASSIUM SERPL-SCNC: 3.7 MMOL/L (ref 3.5–5.1)
RBC # BLD AUTO: 2.87 M/UL (ref 4.1–5.7)
SODIUM SERPL-SCNC: 141 MMOL/L (ref 136–145)
WBC # BLD AUTO: 4.1 K/UL (ref 4.1–11.1)

## 2021-11-11 PROCEDURE — 74011636637 HC RX REV CODE- 636/637: Performed by: PHYSICIAN ASSISTANT

## 2021-11-11 PROCEDURE — 94760 N-INVAS EAR/PLS OXIMETRY 1: CPT

## 2021-11-11 PROCEDURE — 74011250637 HC RX REV CODE- 250/637: Performed by: STUDENT IN AN ORGANIZED HEALTH CARE EDUCATION/TRAINING PROGRAM

## 2021-11-11 PROCEDURE — 94664 DEMO&/EVAL PT USE INHALER: CPT

## 2021-11-11 PROCEDURE — 80048 BASIC METABOLIC PNL TOTAL CA: CPT

## 2021-11-11 PROCEDURE — 36415 COLL VENOUS BLD VENIPUNCTURE: CPT

## 2021-11-11 PROCEDURE — G0378 HOSPITAL OBSERVATION PER HR: HCPCS

## 2021-11-11 PROCEDURE — 74011250636 HC RX REV CODE- 250/636: Performed by: HOSPITALIST

## 2021-11-11 PROCEDURE — 74011250636 HC RX REV CODE- 250/636: Performed by: STUDENT IN AN ORGANIZED HEALTH CARE EDUCATION/TRAINING PROGRAM

## 2021-11-11 PROCEDURE — 94640 AIRWAY INHALATION TREATMENT: CPT

## 2021-11-11 PROCEDURE — 96372 THER/PROPH/DIAG INJ SC/IM: CPT

## 2021-11-11 PROCEDURE — 96376 TX/PRO/DX INJ SAME DRUG ADON: CPT

## 2021-11-11 PROCEDURE — 85025 COMPLETE CBC W/AUTO DIFF WBC: CPT

## 2021-11-11 PROCEDURE — 65660000000 HC RM CCU STEPDOWN

## 2021-11-11 PROCEDURE — A9270 NON-COVERED ITEM OR SERVICE: HCPCS | Performed by: PHYSICIAN ASSISTANT

## 2021-11-11 PROCEDURE — 74011000250 HC RX REV CODE- 250: Performed by: STUDENT IN AN ORGANIZED HEALTH CARE EDUCATION/TRAINING PROGRAM

## 2021-11-11 RX ORDER — IPRATROPIUM BROMIDE AND ALBUTEROL SULFATE 2.5; .5 MG/3ML; MG/3ML
3 SOLUTION RESPIRATORY (INHALATION)
Status: DISCONTINUED | OUTPATIENT
Start: 2021-11-11 | End: 2021-11-19 | Stop reason: HOSPADM

## 2021-11-11 RX ORDER — PREDNISONE 20 MG/1
20 TABLET ORAL
Status: DISCONTINUED | OUTPATIENT
Start: 2021-11-12 | End: 2021-11-15

## 2021-11-11 RX ADMIN — LORAZEPAM 4 MG: 2 INJECTION INTRAMUSCULAR; INTRAVENOUS at 00:57

## 2021-11-11 RX ADMIN — LORAZEPAM 4 MG: 2 INJECTION INTRAMUSCULAR; INTRAVENOUS at 10:43

## 2021-11-11 RX ADMIN — LORAZEPAM 4 MG: 2 INJECTION INTRAMUSCULAR; INTRAVENOUS at 12:32

## 2021-11-11 RX ADMIN — IPRATROPIUM BROMIDE AND ALBUTEROL SULFATE 3 ML: .5; 3 SOLUTION RESPIRATORY (INHALATION) at 19:00

## 2021-11-11 RX ADMIN — Medication 100 MG: at 09:15

## 2021-11-11 RX ADMIN — FOLIC ACID 1 MG: 1 TABLET ORAL at 09:14

## 2021-11-11 RX ADMIN — LORAZEPAM 2 MG: 2 INJECTION INTRAMUSCULAR; INTRAVENOUS at 23:03

## 2021-11-11 RX ADMIN — MULTIPLE VITAMINS W/ MINERALS TAB 1 TABLET: TAB at 09:14

## 2021-11-11 RX ADMIN — PREDNISONE 40 MG: 20 TABLET ORAL at 08:01

## 2021-11-11 RX ADMIN — LORAZEPAM 4 MG: 2 INJECTION INTRAMUSCULAR; INTRAVENOUS at 15:15

## 2021-11-11 RX ADMIN — ARFORMOTEROL TARTRATE 15 MCG: 15 SOLUTION RESPIRATORY (INHALATION) at 19:00

## 2021-11-11 RX ADMIN — Medication 10 ML: at 12:32

## 2021-11-11 RX ADMIN — LORAZEPAM 4 MG: 2 INJECTION INTRAMUSCULAR; INTRAVENOUS at 21:54

## 2021-11-11 RX ADMIN — CHLORDIAZEPOXIDE HYDROCHLORIDE 50 MG: 25 CAPSULE ORAL at 09:15

## 2021-11-11 RX ADMIN — SODIUM CHLORIDE 75 ML/HR: 9 INJECTION, SOLUTION INTRAVENOUS at 22:06

## 2021-11-11 RX ADMIN — LORAZEPAM 4 MG: 2 INJECTION INTRAMUSCULAR; INTRAVENOUS at 05:32

## 2021-11-11 RX ADMIN — IPRATROPIUM BROMIDE AND ALBUTEROL SULFATE 3 ML: .5; 3 SOLUTION RESPIRATORY (INHALATION) at 16:17

## 2021-11-11 RX ADMIN — IPRATROPIUM BROMIDE AND ALBUTEROL SULFATE 3 ML: .5; 3 SOLUTION RESPIRATORY (INHALATION) at 12:25

## 2021-11-11 RX ADMIN — SODIUM CHLORIDE 125 ML/HR: 9 INJECTION, SOLUTION INTRAVENOUS at 09:15

## 2021-11-11 RX ADMIN — BUDESONIDE 500 MCG: 0.5 INHALANT RESPIRATORY (INHALATION) at 19:00

## 2021-11-11 RX ADMIN — CHLORDIAZEPOXIDE HYDROCHLORIDE 50 MG: 25 CAPSULE ORAL at 21:54

## 2021-11-11 RX ADMIN — Medication 10 ML: at 21:55

## 2021-11-11 RX ADMIN — ENOXAPARIN SODIUM 40 MG: 100 INJECTION SUBCUTANEOUS at 09:15

## 2021-11-11 RX ADMIN — LORAZEPAM 4 MG: 2 INJECTION INTRAMUSCULAR; INTRAVENOUS at 17:58

## 2021-11-11 NOTE — PROGRESS NOTES
0940: Patient found lying on the side on the floor. No witness of fall. Patient stated he had a fall and hit his head on the side rail of the bed. No injury noted. Took the vitals and it was stable. Doctor, Nursing supervisor and family were notified. Doctor ordered CT scan. Patient still tried to get out of the bed. He is on restrains as ordered.

## 2021-11-11 NOTE — PROGRESS NOTES
TRANSFER - IN REPORT:    Verbal report received from Angelika(name) on Medhat Herb  being received from 3N(unit) for routine progression of care      Report consisted of patients Situation, Background, Assessment and   Recommendations(SBAR). Information from the following report(s) SBAR, Kardex, [facility] and Recent Results was reviewed with the receiving nurse. Opportunity for questions and clarification was provided. Assessment completed upon patients arrival to unit and care assumed.

## 2021-11-11 NOTE — PROGRESS NOTES
Occupational therapy  11/11/21     OT eval order received and acknowledged. OT eval attempted at 3:22 pm and nursing consulted prior to eval, reports patient just received a dose of Ativan and requesting therapy to hold today, attempt again tomorrow as able. Will continue to follow patient and attempt OT eval at a later time.      Thank you,  Mohit Ba, OTR/L

## 2021-11-11 NOTE — PROGRESS NOTES
[address] [facility] Adult  Hospitalist Group                                                                                          Hospitalist Progress Note  Alyce Brunner, MD  Answering service: [phone] from in house phone        Date of Service:  2021  NAME:  Harjinder Doherty  :  1950  MRN:  [id number]      Admission Summary:   Harjinder Doherty is a 70 y.o. male past medical history of asthma, history of PE complicated by hematemesis for which Eliquis was held, EtOH abuse, chronic essential tremor presents with primary complaint of shortness of breath. [facility]. States he presented to take his albuterol inhaler multiple times without relief. Denies fever chills, cough, chest pain. States he is a chronic drinker of vodka. States he drinks anywhere from two 2 ounce glasses a day to 4-6 on weekends although seemed rather reluctant to tell me the true amount he takes. States he \"thinks\" his last drink may have been yesterday. Denies use of recreational drugs. States he still currently feels short of breath. Vaccinated for Covid.      When I evaluated patient, he was tachycardic heart rate 115's, blood pressure stable, saturating well on RA. Received 1 mg of Ativan . ethanol elevated to 168. Labs notable for hypokalemia with a K of 2.7. Mag 1.2. CXR showed no acute process. Interval history / Subjective:    Patient seen and examined at bedside.      Had a fall last night, now requiring restraints       Assessment & Plan:     Acute alcohol withdrawal  High CIWA score- required 30 mg ativan so far  On librium  -continue CIWA protocol and prn ativan     Asthma exacerbation-improved  -now stable on room air  Pulm following, decrease to 20 mg prednisone  -can be d.c'd c and f/u with pulmonary patient [facility] pulm medicine from pul standpt  -flu and covid neg  -Continue duo nebs, Pulmicort, can resume home doses on d/c       Hypokalemia, hypomagnesemia  Repleted as needed       History of PE complicated by developing hematemesis for which Eliquis was held  -Currently stable on room air saturating well     EtOH abuse  Counseled on abstaining       Diet: Regular   DVT PPx: Lovenox subcu  Code: Full        Care Plan discussed with: Patient/Family and Nurse  Anticipated Disposition: Home w/Family  Anticipated Discharge:>48 hrs      Discussed with wife     Hospital Problems  Date Reviewed: 10/25/2021          Codes Class Noted POA    Alcohol withdrawal ([facility] ICD-10-CM: Z93.024  ICD-9-CM: 291.81  11/9/2021 Unknown        COPD (chronic obstructive pulmonary disease) ([facility] ICD-10-CM: J44.9  ICD-9-CM: 039  11/9/2021 Unknown        COPD exacerbation ([facility] ICD-10-CM: J44.1  ICD-9-CM: 491.21  11/9/2021 Unknown                Review of Systems:   A comprehensive review of systems was negative except for that written in the HPI. Vital Signs:    Last 24hrs VS reviewed since prior progress note. Most recent are:  Visit Vitals  /82 (BP 1 Location: Left upper arm, BP Patient Position: At rest)   Pulse 88   Temp 97.5 °F (36.4 °C)   Resp 16   Ht 5' 7\" (1.702 m)   Wt 72.8 kg (160 lb 7.9 oz)   SpO2 98%   BMI 25.14 kg/m²         Intake/Output Summary (Last 24 hours) at 11/11/2021 1644  Last data filed at 11/11/2021 1504  Gross per 24 hour   Intake    Output 1550 ml   Net -1550 ml        Physical Examination:     I had a face to face encounter with this patient and independently examined them on 11/11/2021 as outlined below:          Constitutional:  agitated   ENT:  Oral mucosa moist,EOMI,anciteric sclera   Resp:  CTA bilaterally. No wheezing/rhonchi/rales.  No accessory muscle use   CV:  tachy, normal rate,S1, S2 wnl    GI:  Soft, non distended, non tender,bowel sounds +    Musculoskeletal:  No LE edema    Neurologic: Awake, alert, oriented himself,moves extremities, did not follow commands            Data Review:    Review and/or order of clinical lab test  Review and/or order of tests in the radiology section of CPT  Review and/or order of tests in the medicine section of CPT      Labs:     Recent Labs     11/11/21  0547 11/10/21  0245   WBC 4.1 3.5*   HGB 8.9* 8.8*   HCT 27.9* 26.8*   * 124*     Recent Labs     11/11/21  0547 11/10/21  0245 11/09/21  0602 11/09/21  0500 11/09/21  0230 11/09/21  0230    136 142  --    < > 141   K 3.7 3.5 2.7*  --    < > 2.8*   * 107 103  --    < > 100   CO2 23 22 21  --    < > 19*   BUN 6 5* 6  --    < > 6   CREA 0.70 0.90 0.96  --    < > 0.97   GLU 91 165* 155*  --    < > 124*   CA 8.4* 8.5 7.5*  --    < > 8.6   MG  --  2.1  --  1.3*  --  1.2*    < > = values in this interval not displayed. Recent Labs     11/09/21 0230   ALT 53   *   TBILI 0.9   TP 6.8   ALB 3.0*   GLOB 3.8     No results for input(s): INR, PTP, APTT, INREXT, INREXT in the last 72 hours. No results for input(s): FE, TIBC, PSAT, FERR in the last 72 hours. No results found for: FOL, RBCF   No results for input(s): PH, PCO2, PO2 in the last 72 hours. No results for input(s): CPK, CKNDX, TROIQ in the last 72 hours.     No lab exists for component: CPKMB  No results found for: CHOL, CHOLX, CHLST, CHOLV, HDL, HDLP, LDL, LDLC, DLDLP, TGLX, TRIGL, TRIGP, CHHD, CHHDX  Lab Results   Component Value Date/Time    Glucose (POC) 185 (H) 10/26/2021 09:00 AM    Glucose (POC) 152 (H) 10/25/2021 06:42 AM     No results found for: COLOR, APPRN, SPGRU, REFSG, RUSLAN, PROTU, GLUCU, KETU, BILU, UROU, ESTRELLA, LEUKU, GLUKE, EPSU, BACTU, WBCU, RBCU, CASTS, UCRY      Medications Reviewed:     Current Facility-Administered Medications   Medication Dose Route Frequency    [START ON 11/12/2021] predniSONE (DELTASONE) tablet 20 mg  20 mg Oral DAILY WITH BREAKFAST    chlordiazePOXIDE (LIBRIUM) capsule 50 mg  50 mg Oral Q12H    albuterol-ipratropium (DUO-NEB) 2.5 MG-0.5 MG/3 ML  3 mL Nebulization QID RT    albuterol (PROVENTIL VENTOLIN) nebulizer solution 2.5 mg  2.5 mg Inhalation Q4H PRN    sodium chloride (NS) flush 5-40 mL  5-40 mL IntraVENous Q8H    sodium chloride (NS) flush 5-40 mL  5-40 mL IntraVENous PRN    acetaminophen (TYLENOL) tablet 650 mg  650 mg Oral Q6H PRN    Or    acetaminophen (TYLENOL) suppository 650 mg  650 mg Rectal Q6H PRN    polyethylene glycol (MIRALAX) packet 17 g  17 g Oral DAILY PRN    ondansetron (ZOFRAN ODT) tablet 4 mg  4 mg Oral Q8H PRN    Or    ondansetron (ZOFRAN) injection 4 mg  4 mg IntraVENous Q6H PRN    enoxaparin (LOVENOX) injection 40 mg  40 mg SubCUTAneous DAILY    budesonide (PULMICORT) 500 mcg/2 ml nebulizer suspension  500 mcg Nebulization BID RT    arformoteroL (BROVANA) neb solution 15 mcg  2 mL Nebulization BID RT    multivitamin, tx-iron-ca-min (THERA-M w/ IRON) tablet 1 Tablet  1 Tablet Oral DAILY    LORazepam (ATIVAN) injection 2 mg  2 mg IntraVENous Q1H PRN    LORazepam (ATIVAN) injection 4 mg  4 mg IntraVENous Q1H PRN    thiamine HCL (B-1) tablet 100 mg  100 mg Oral DAILY    folic acid (FOLVITE) tablet 1 mg  1 mg Oral DAILY    0.9% sodium chloride infusion  75 mL/hr IntraVENous CONTINUOUS    calcium carbonate (TUMS) chewable tablet 200 mg [elemental]  200 mg Oral PRN     ______________________________________________________________________  EXPECTED LENGTH OF STAY: 2d 9h  ACTUAL LENGTH OF STAY:          1                 Harika Ordaz MD

## 2021-11-11 NOTE — PROGRESS NOTES
Post Fall Documentation      Ghulam Vance witnessed/unwitnessed fall occurred on 11/10/2021   (Date) at 2140 (Time). The answers to the following questions summarize the fall: In the patient's own words,:  · What were you attempting to do when you fell? Trying to get out of bed  · Do you know why you fell? I don't know  · Do you have any pain/discomfort or any other complaints? No  · Which part of your body made contact with the floor or other object? Head hit on side rail of the bed. Nurse:  [facility] Was this an assisted fall? no   Was fall witnessed? No   If witnessed, what part of the body made contact with the floor or other object?  head   Patients mental status after the fall/when found: Alert and oriented   Any apparent injury:  No apparent injury   Immediate interventions for injury/suspected injury? No interventions needed   Patient assisted back to bed? Assist X2   Name of provider notified and time, any comments? Dr. John Gan 11/10/2021        Name of family member notified and time: Edilia 21       Immediate VS and physical assessment documented in flow sheets. Neuro assessment every hour x 4 (for potential head injury or unwitnessed fall) documented in flow sheets.       Victor M Mart RN

## 2021-11-11 NOTE — PROGRESS NOTES
Medicare pt has received, reviewed, and signed 1st IM letter informing them of their right to appeal the discharge. Signed copy has been placed on pt bedside chart.     ALHAJI Barnes, ALFA, LMHP-e

## 2021-11-12 LAB
ANION GAP SERPL CALC-SCNC: 5 MMOL/L (ref 5–15)
BASOPHILS # BLD: 0 K/UL (ref 0–0.1)
BASOPHILS NFR BLD: 0 % (ref 0–1)
BUN SERPL-MCNC: 6 MG/DL (ref 6–20)
BUN/CREAT SERPL: 9 (ref 12–20)
CALCIUM SERPL-MCNC: 8.4 MG/DL (ref 8.5–10.1)
CHLORIDE SERPL-SCNC: 111 MMOL/L (ref 97–108)
CO2 SERPL-SCNC: 26 MMOL/L (ref 21–32)
CREAT SERPL-MCNC: 0.67 MG/DL (ref 0.7–1.3)
DIFFERENTIAL METHOD BLD: ABNORMAL
EOSINOPHIL # BLD: 0 K/UL (ref 0–0.4)
EOSINOPHIL NFR BLD: 0 % (ref 0–7)
ERYTHROCYTE [DISTWIDTH] IN BLOOD BY AUTOMATED COUNT: 14.4 % (ref 11.5–14.5)
GLUCOSE SERPL-MCNC: 98 MG/DL (ref 65–100)
HCT VFR BLD AUTO: 28.3 % (ref 36.6–50.3)
HGB BLD-MCNC: 9.1 G/DL (ref 12.1–17)
IMM GRANULOCYTES # BLD AUTO: 0 K/UL (ref 0–0.04)
IMM GRANULOCYTES NFR BLD AUTO: 1 % (ref 0–0.5)
LYMPHOCYTES # BLD: 1.4 K/UL (ref 0.8–3.5)
LYMPHOCYTES NFR BLD: 41 % (ref 12–49)
MAGNESIUM SERPL-MCNC: 2 MG/DL (ref 1.6–2.4)
MCH RBC QN AUTO: 31 PG (ref 26–34)
MCHC RBC AUTO-ENTMCNC: 32.2 G/DL (ref 30–36.5)
MCV RBC AUTO: 96.3 FL (ref 80–99)
MONOCYTES # BLD: 0.1 K/UL (ref 0–1)
MONOCYTES NFR BLD: 4 % (ref 5–13)
NEUTS SEG # BLD: 1.8 K/UL (ref 1.8–8)
NEUTS SEG NFR BLD: 54 % (ref 32–75)
NRBC # BLD: 0 K/UL (ref 0–0.01)
NRBC BLD-RTO: 0 PER 100 WBC
PHOSPHATE SERPL-MCNC: 3.4 MG/DL (ref 2.6–4.7)
PLATELET # BLD AUTO: 109 K/UL (ref 150–400)
PMV BLD AUTO: 9.7 FL (ref 8.9–12.9)
POTASSIUM SERPL-SCNC: 3.5 MMOL/L (ref 3.5–5.1)
RBC # BLD AUTO: 2.94 M/UL (ref 4.1–5.7)
SODIUM SERPL-SCNC: 142 MMOL/L (ref 136–145)
WBC # BLD AUTO: 3.4 K/UL (ref 4.1–11.1)

## 2021-11-12 PROCEDURE — 94664 DEMO&/EVAL PT USE INHALER: CPT

## 2021-11-12 PROCEDURE — 85025 COMPLETE CBC W/AUTO DIFF WBC: CPT

## 2021-11-12 PROCEDURE — 36415 COLL VENOUS BLD VENIPUNCTURE: CPT

## 2021-11-12 PROCEDURE — 97535 SELF CARE MNGMENT TRAINING: CPT

## 2021-11-12 PROCEDURE — 65660000000 HC RM CCU STEPDOWN

## 2021-11-12 PROCEDURE — 97530 THERAPEUTIC ACTIVITIES: CPT

## 2021-11-12 PROCEDURE — 74011000250 HC RX REV CODE- 250: Performed by: STUDENT IN AN ORGANIZED HEALTH CARE EDUCATION/TRAINING PROGRAM

## 2021-11-12 PROCEDURE — 74011636637 HC RX REV CODE- 636/637: Performed by: HOSPITALIST

## 2021-11-12 PROCEDURE — 83735 ASSAY OF MAGNESIUM: CPT

## 2021-11-12 PROCEDURE — 74011250636 HC RX REV CODE- 250/636: Performed by: STUDENT IN AN ORGANIZED HEALTH CARE EDUCATION/TRAINING PROGRAM

## 2021-11-12 PROCEDURE — 97165 OT EVAL LOW COMPLEX 30 MIN: CPT

## 2021-11-12 PROCEDURE — 74011250637 HC RX REV CODE- 250/637: Performed by: STUDENT IN AN ORGANIZED HEALTH CARE EDUCATION/TRAINING PROGRAM

## 2021-11-12 PROCEDURE — 84100 ASSAY OF PHOSPHORUS: CPT

## 2021-11-12 PROCEDURE — 94640 AIRWAY INHALATION TREATMENT: CPT

## 2021-11-12 PROCEDURE — 80048 BASIC METABOLIC PNL TOTAL CA: CPT

## 2021-11-12 RX ADMIN — BUDESONIDE 500 MCG: 0.5 INHALANT RESPIRATORY (INHALATION) at 07:48

## 2021-11-12 RX ADMIN — ONDANSETRON HYDROCHLORIDE 4 MG: 2 SOLUTION INTRAMUSCULAR; INTRAVENOUS at 10:44

## 2021-11-12 RX ADMIN — ALBUTEROL SULFATE 2.5 MG: 2.5 SOLUTION RESPIRATORY (INHALATION) at 04:03

## 2021-11-12 RX ADMIN — CHLORDIAZEPOXIDE HYDROCHLORIDE 50 MG: 25 CAPSULE ORAL at 10:25

## 2021-11-12 RX ADMIN — PREDNISONE 20 MG: 20 TABLET ORAL at 10:25

## 2021-11-12 RX ADMIN — ARFORMOTEROL TARTRATE 15 MCG: 15 SOLUTION RESPIRATORY (INHALATION) at 20:15

## 2021-11-12 RX ADMIN — LORAZEPAM 2 MG: 2 INJECTION INTRAMUSCULAR; INTRAVENOUS at 10:25

## 2021-11-12 RX ADMIN — LORAZEPAM 4 MG: 2 INJECTION INTRAMUSCULAR; INTRAVENOUS at 05:26

## 2021-11-12 RX ADMIN — MULTIPLE VITAMINS W/ MINERALS TAB 1 TABLET: TAB at 10:25

## 2021-11-12 RX ADMIN — ARFORMOTEROL TARTRATE 15 MCG: 15 SOLUTION RESPIRATORY (INHALATION) at 07:48

## 2021-11-12 RX ADMIN — BUDESONIDE 500 MCG: 0.5 INHALANT RESPIRATORY (INHALATION) at 20:15

## 2021-11-12 RX ADMIN — LORAZEPAM 2 MG: 2 INJECTION INTRAMUSCULAR; INTRAVENOUS at 03:45

## 2021-11-12 RX ADMIN — FOLIC ACID 1 MG: 1 TABLET ORAL at 10:25

## 2021-11-12 RX ADMIN — LORAZEPAM 2 MG: 2 INJECTION INTRAMUSCULAR; INTRAVENOUS at 19:01

## 2021-11-12 RX ADMIN — LORAZEPAM 2 MG: 2 INJECTION INTRAMUSCULAR; INTRAVENOUS at 12:54

## 2021-11-12 RX ADMIN — CHLORDIAZEPOXIDE HYDROCHLORIDE 50 MG: 25 CAPSULE ORAL at 21:27

## 2021-11-12 RX ADMIN — ENOXAPARIN SODIUM 40 MG: 100 INJECTION SUBCUTANEOUS at 10:25

## 2021-11-12 RX ADMIN — Medication 100 MG: at 10:25

## 2021-11-12 NOTE — PROGRESS NOTES
[address] [facility] Adult  Hospitalist Group                                                                                          Hospitalist Progress Note  Sheridan Black MD  Answering service: [phone] OR 36 from in house phone        Date of Service:  2021  NAME:  Jazzmine Harrison  :  1950  MRN:  [id number]      Admission Summary:   Jazzmine Harrison is a 70 y.o. male past medical history of asthma, history of PE complicated by hematemesis for which Eliquis was held, EtOH abuse, chronic essential tremor presents with primary complaint of shortness of breath. [facility]. States he presented to take his albuterol inhaler multiple times without relief. Denies fever chills, cough, chest pain. States he is a chronic drinker of vodka. States he drinks anywhere from two 2 ounce glasses a day to 4-6 on weekends although seemed rather reluctant to tell me the true amount he takes. States he \"thinks\" his last drink may have been yesterday. Denies use of recreational drugs. States he still currently feels short of breath. Vaccinated for Covid.      When I evaluated patient, he was tachycardic heart rate 115's, blood pressure stable, saturating well on RA. Received 1 mg of Ativan . ethanol elevated to 168. Labs notable for hypokalemia with a K of 2.7. Mag 1.2. CXR showed no acute process. Interval history / Subjective:    Patient seen and examined at bedside.      He was sleeping during my eval,received lower doses of ativan today       Assessment & Plan:     Acute alcohol withdrawal  On librium  -continue CIWA protocol and prn ativan  Received 10 mg ativan since this morning     Asthma exacerbation-resolved  -now stable on room air  Pulm signed off, decrease to 20 mg prednisone  -can be d.c'd c and f/u with pulmonary patient [facility] pul medicine from pul standpt  -flu and covid neg  -Continue duo nebs, Pulmicort, can resume home doses on d/c       Hypokalemia, hypomagnesemia  Repleted as needed       History of PE complicated by developing hematemesis for which Eliquis was held PTA  -Currently stable on room air saturating well     EtOH abuse  Counseled on abstaining       Diet: Regular   DVT PPx: Lovenox subcu  Code: Full        Care Plan discussed with: Patient/Family and Nurse  Anticipated Disposition: rehab  Anticipated Discharge:>48 hrs      Discussed with wife     Hospital Problems  Date Reviewed: 10/25/2021          Codes Class Noted POA    Alcohol withdrawal ([facility] ICD-10-CM: F90.135  ICD-9-CM: 291.81  11/9/2021 Unknown        COPD (chronic obstructive pulmonary disease) ([facility] ICD-10-CM: J44.9  ICD-9-CM: 744  11/9/2021 Unknown        COPD exacerbation ([facility] ICD-10-CM: J44.1  ICD-9-CM: 491.21  11/9/2021 Unknown                Review of Systems:   Unable to obtain      Vital Signs:    Last 24hrs VS reviewed since prior progress note. Most recent are:  Visit Vitals  /67 (BP 1 Location: Left upper arm, BP Patient Position: At rest)   Pulse 70   Temp 97.4 °F (36.3 °C)   Resp 20   Ht 5' 7\" (1.702 m)   Wt 72.8 kg (160 lb 7.9 oz)   SpO2 95%   BMI 25.14 kg/m²         Intake/Output Summary (Last 24 hours) at 11/12/2021 1527  Last data filed at 11/12/2021 0749  Gross per 24 hour   Intake 4895 ml   Output 725 ml   Net 4170 ml        Physical Examination:     I had a face to face encounter with this patient and independently examined them on 11/12/2021 as outlined below:          Constitutional:  sleeping today   ENT:  Normal conjunctiva,anciteric sclera   Resp:  CTA bilaterally. No wheezing/rhonchi/rales.  No accessory muscle use   CV:  tachy, normal rate,S1, S2 wnl    GI:  Soft, non distended, non tender,bowel sounds +    Musculoskeletal:  No LE edema    Neurologic: Lethargic/sleeping            Data Review:    Review and/or order of clinical lab test    Review and/or order of tests in the medicine section of [facility]      Labs:     Recent Labs     11/12/21  5022 11/11/21  0547   WBC 3.4* 4.1   HGB 9.1* 8.9*   HCT 28.3* 27.9*   * 109*     Recent Labs     11/12/21  0357 11/11/21  0547 11/10/21  0245    141 136   K 3.5 3.7 3.5   * 114* 107   CO2 26 23 22   BUN 6 6 5*   CREA 0.67* 0.70 0.90   GLU 98 91 165*   CA 8.4* 8.4* 8.5   MG 2.0  --  2.1   PHOS 3.4  --   --      No results for input(s): ALT, AP, TBIL, TBILI, TP, ALB, GLOB, GGT, AML, LPSE in the last 72 hours. No lab exists for component: SGOT, GPT, AMYP, HLPSE  No results for input(s): INR, PTP, APTT, INREXT, INREXT in the last 72 hours. No results for input(s): FE, TIBC, PSAT, FERR in the last 72 hours. No results found for: FOL, RBCF   No results for input(s): PH, PCO2, PO2 in the last 72 hours. No results for input(s): CPK, CKNDX, TROIQ in the last 72 hours.     No lab exists for component: CPKMB  No results found for: CHOL, CHOLX, CHLST, CHOLV, HDL, HDLP, LDL, LDLC, DLDLP, TGLX, TRIGL, TRIGP, CHHD, CHHDX  Lab Results   Component Value Date/Time    Glucose (POC) 185 (H) 10/26/2021 09:00 AM    Glucose (POC) 152 (H) 10/25/2021 06:42 AM     No results found for: COLOR, APPRN, SPGRU, REFSG, RUSLAN, PROTU, GLUCU, KETU, BILU, UROU, ESTRELLA, LEUKU, GLUKE, EPSU, BACTU, WBCU, RBCU, CASTS, UCRY      Medications Reviewed:     Current Facility-Administered Medications   Medication Dose Route Frequency    predniSONE (DELTASONE) tablet 20 mg  20 mg Oral DAILY WITH BREAKFAST    albuterol-ipratropium (DUO-NEB) 2.5 MG-0.5 MG/3 ML  3 mL Nebulization Q4H PRN    chlordiazePOXIDE (LIBRIUM) capsule 50 mg  50 mg Oral Q12H    albuterol (PROVENTIL VENTOLIN) nebulizer solution 2.5 mg  2.5 mg Inhalation Q4H PRN    sodium chloride (NS) flush 5-40 mL  5-40 mL IntraVENous Q8H    sodium chloride (NS) flush 5-40 mL  5-40 mL IntraVENous PRN    acetaminophen (TYLENOL) tablet 650 mg  650 mg Oral Q6H PRN    Or    acetaminophen (TYLENOL) suppository 650 mg  650 mg Rectal Q6H PRN    polyethylene glycol (MIRALAX) packet 17 g  17 g Oral DAILY PRN    ondansetron (ZOFRAN ODT) tablet 4 mg  4 mg Oral Q8H PRN    Or    ondansetron (ZOFRAN) injection 4 mg  4 mg IntraVENous Q6H PRN    enoxaparin (LOVENOX) injection 40 mg  40 mg SubCUTAneous DAILY    budesonide (PULMICORT) 500 mcg/2 ml nebulizer suspension  500 mcg Nebulization BID RT    arformoteroL (BROVANA) neb solution 15 mcg  2 mL Nebulization BID RT    multivitamin, tx-iron-ca-min (THERA-M w/ IRON) tablet 1 Tablet  1 Tablet Oral DAILY    LORazepam (ATIVAN) injection 2 mg  2 mg IntraVENous Q1H PRN    LORazepam (ATIVAN) injection 4 mg  4 mg IntraVENous Q1H PRN    thiamine HCL (B-1) tablet 100 mg  100 mg Oral DAILY    folic acid (FOLVITE) tablet 1 mg  1 mg Oral DAILY    0.9% sodium chloride infusion  75 mL/hr IntraVENous CONTINUOUS    calcium carbonate (TUMS) chewable tablet 200 mg [elemental]  200 mg Oral PRN     ______________________________________________________________________  EXPECTED LENGTH OF STAY: 3d 9h  ACTUAL LENGTH OF STAY:          2                 Gunner Carter MD

## 2021-11-12 NOTE — ROUTINE PROCESS
Bedside shift change report given to Jam Dalal (oncoming nurse) by Marybel Linares (offgoing nurse). Report included the following information SBAR, Kardex, Intake/Output, MAR and Recent Results.

## 2021-11-12 NOTE — PROGRESS NOTES
Transition of Care Plan   RUR- 15% Moderate Risk   DISPOSITION: The disposition plan is pending medical progression and recommendation.  F/U with PCP/Specialist     Transport: [facility]/family     At 11:45am - CM contacted patients spouse per request, Hawa Awad - [phone] who reports that she spoke with patients PCP and they agreed that patient would benefit from going to a Rehab for Substance Abuse. Patients spouse also requested that attending contact patients spouse with a medical update. CM perfect served attending. CM will continue to provide updates as they become available. CM will continue to follow, provide support and assist with ERICH needs as they arise.     Mendel Penning, ALHAJI, CRM, LMHP-e

## 2021-11-12 NOTE — PROGRESS NOTES
Bedside shift change report given to Cuca Elaine RN (oncoming nurse) by Beto Yates RN (offgoing nurse). Report included the following information SBAR and Kardex.

## 2021-11-12 NOTE — PROGRESS NOTES
Problem: Mobility Impaired (Adult and Pediatric)  Goal: *Acute Goals and Plan of Care (Insert Text)  Description: FUNCTIONAL STATUS PRIOR TO ADMISSION: Patient required minimal assistance for basic and instrumental ADLs with pt reporting wife assists him. However, appears patient was still driving    [address]: The patient lived with wife but did not require assist.    Physical Therapy Goals  Initiated 11/10/2021  1. Patient will move from supine to sit and sit to supine , scoot up and down, and roll side to side in bed with supervision/set-up within 7 day(s). 2.  Patient will transfer from bed to chair and chair to bed with supervision/set-up using the least restrictive device within 7 day(s). 3.  Patient will perform sit to stand with supervision/set-up within 7 day(s). 4.  Patient will ambulate with supervision/set-up for 150 feet with the least restrictive device within 7 day(s). 5.  Patient will ascend/descend 4 stairs with 1 handrail(s) with minimal assistance/contact guard assist within 7 day(s). Outcome: Progressing Towards Goal   PHYSICAL THERAPY TREATMENT  Patient: Loren Hale (54 y.o. male)  Date: 11/12/2021  Diagnosis: Alcohol withdrawal ([facility] [F10.239]  COPD (chronic obstructive pulmonary disease) ([facility] [J44.9]  COPD exacerbation ([facility] [J44.1]   <principal problem not specified>       Precautions: Fall, seizure, CIWA  Chart, physical therapy assessment, plan of care and goals were reviewed. ASSESSMENT  Patient continues with skilled PT services and is progressing towards goals. Barriers to function with mobility include confusion, UE tremors, general weakness, decreased sitting/ standing balance, decreased activity tolerance, gait dysfunction, increased risk for fall. Pt sleeping at beginning of session, but awakened to verbal stim and remained cooperative throughout session. Pt continues to require increased time and 2 person assist to safely mobilize. Tolerated functional dynamic sitting activity EOB with assist for balance. Pt requested use of toilet and completed stand pivot transfer to [facility] with min A x 2 for balance, wide RUMA, cues for sequence and safety. Pt then able to sidestep toward [facility] with wide RUMA and min HHA x 2. Motivated to eat breakfast, and agreed to bed in chair position at end of session. Pt remains below functional baseline at this time. Will benefit from con't PT for balance/ mobility progression as tolerated. Recommend follow up IPR to facilitate pt return to max level of functional indep. Current Level of Function Impacting Discharge (mobility/balance): bed mob min/ transfers/ sidestep min A x 2    Other factors to consider for discharge: ongoing ETOH detox         PLAN :  Patient continues to benefit from skilled intervention to address the above impairments. Continue treatment per established plan of care. to address goals. Recommendation for discharge: (in order for the patient to meet his/her long term goals)  Therapy 3 hours per day 5-7 days per week    This discharge recommendation:  Has not yet been discussed the attending provider and/or case management    IF patient discharges home will need the following DME: to be determined (TBD)       SUBJECTIVE:   Patient stated I want my scrambled eggs.     OBJECTIVE DATA SUMMARY:   Critical Behavior:  Neurologic State: Drowsy, Alert, Confused (initially drowsy)  Orientation Level: Oriented to person, Disoriented to place, Disoriented to situation, Disoriented to time (able to recall year but not month)  Cognition: Decreased attention/concentration, Follows commands, Impaired decision making, Poor safety awareness  Safety/Judgement: Awareness of environment, Decreased awareness of need for assistance, Decreased awareness of need for safety, Decreased insight into deficits, Fall prevention  Functional Mobility Training:  Bed Mobility:     Supine to Sit: Minimum assistance; Assist x2  Sit to Supine: Minimum assistance           Transfers:  Sit to Stand: Minimum assistance; Assist x2  Stand to Sit: Minimum assistance; Assist x2                             Balance:  Sitting: Impaired  Sitting - Static: Fair (occasional)  Sitting - Dynamic: Fair (occasional); Poor (constant support)  Standing: Impaired; With support  Standing - Static: Constant support; Fair; Poor  Standing - Dynamic : Constant support; Poor  Ambulation/Gait Training:         Sidestep toward R with min A x 2, cues for sequence     Pain Rating:  No c/o pain    Activity Tolerance:   Fair and requires rest breaks    After treatment patient left in no apparent distress:   Call bell within reach, Bed / chair alarm activated, Side rails x 3, and bed in chair position with breakfast tray set up    COMMUNICATION/COLLABORATION:   The patients plan of care was discussed with: Occupational therapist and Registered nurse.      Antonella Gar PT   Time Calculation: 30 mins

## 2021-11-12 NOTE — PROGRESS NOTES
Orders received, chart reviewed and patient evaluated by occupational therapy. Pending progression with skilled acute occupational therapy, recommend: To be determined: IPR vs. HHOT with increased assist pending progress     Recommend with nursing ADLs with supervision/setup, OOB to chair 3x/day and toileting via beside commode 2 assist and gait belt. Thank you for completing as able in order to maintain patient strength, endurance and independence. Full evaluation to follow.

## 2021-11-12 NOTE — PROGRESS NOTES
Verbal shift change report given to Uyen Morillo RN (oncoming nurse) by Sammy Martinez RN (offgoing nurse). Report included the following information SBAR, Kardex, Intake/Output, MAR, Recent Results, Cardiac Rhythm NSR, Alarm Parameters  and Quality Measures. 2154: Shift assessment completed, see flowsheet documentation. CIWA 22, PRN given    2304: CIWA 13, PRN given    0015: Verbal shift change report given to Mily Abdalla RN (oncoming nurse) by [employer], RN (offgoing nurse). Report included the following information SBAR, Kardex, Intake/Output, MAR, Cardiac Rhythm NSR, Alarm Parameters  and Quality Measures.

## 2021-11-12 NOTE — PROGRESS NOTES
Problem: Self Care Deficits Care Plan (Adult)  Goal: *Acute Goals and Plan of Care (Insert Text)  Description:   FUNCTIONAL STATUS PRIOR TO ADMISSION: Patient was independent and active without use of DME.     HOME SUPPORT: The patient lived with wife but did not require assist.    Occupational Therapy Goals  Initiated 11/12/2021  1. Patient will perform grooming in standing for 5 minutes with CGA within 7 day(s). 2.  Patient will perform bathing using most appropriate DME with minimal assistance/contact guard assist within 7 day(s). 3.  Patient will perform lower body dressing with minimal assistance/contact guard assist within 7 day(s). 4.  Patient will perform toilet transfers with minimal assistance/contact guard assist within 7 day(s). 5.  Patient will perform all aspects of toileting with minimal assistance/contact guard assist within 7 day(s). 6.  Patient will participate in upper extremity therapeutic exercise/activities with supervision/set-up for 5 minutes within 7 day(s). 7.  Patient will utilize energy conservation techniques during functional activities with verbal cues within 7 day(s). Outcome: Not Met    OCCUPATIONAL THERAPY EVALUATION  Patient: Norah Rai (67 y.o. male)  Date: 11/12/2021  Primary Diagnosis: Alcohol withdrawal ([facility] [F10.239]  COPD (chronic obstructive pulmonary disease) ([facility] [J44.9]  COPD exacerbation (H[facility]) [J44.1]        Precautions:  Fall    ASSESSMENT  Based on the objective data described below, the patient presents with limited ADL performance s/p admission for alcohol withdrawal and COPD. Patient ADLs limited by impaired balance, generalized weakness, decreased functional activity tolerance, limited lower body access and impaired cognition (attention to task, command follow, complex processing, insight into deficits, safety awareness). At baseline, patient lives with wife and was IND with ADLs.      Today, patient required min A x2 for bed mobility and mod A x2 to stand and transfer to [facility]. Patient required mod A for toileting and min A to return to supine. Patient required min A for self feeding (total A for container management but otherwise setup). Patient left sitting in modified chair position with call bell in reach, RN aware, alarm active, PCT bedside to supervise patient with eating 2/2 bilateral wrist restraints. Will continue to follow - anticipate good progress, however, may require short rehab stay pending ability to safely mobilize and complete ADLs. Current Level of Function Impacting Discharge (ADLs/self-care): up mod A x2 for mobility    Functional Outcome Measure: The patient scored Total: 25/100 on the Barthel Index outcome measure which is indicative of being very dependent in basic self-care. Other factors to consider for discharge: was IND with ADLs PTA     Patient will benefit from skilled therapy intervention to address the above noted impairments. PLAN :  Recommendations and Planned Interventions: self care training, functional mobility training, therapeutic exercise, balance training, therapeutic activities, endurance activities, patient education, home safety training, and family training/education    Frequency/Duration: Patient will be followed by occupational therapy 5 times a week to address goals. Recommendation for discharge: (in order for the patient to meet his/her long term goals)  To be determined: IPR vs. Charles Haque pending progress    This discharge recommendation:  Has not yet been discussed the attending provider and/or case management    IF patient discharges home will need the following DME: bedside commode and shower chair       SUBJECTIVE:   Patient stated I live with my wife.     OBJECTIVE DATA SUMMARY:   HISTORY:   Past Medical History:   Diagnosis Date    Chronic obstructive pulmonary disease ([facility]    History reviewed. No pertinent surgical history.     Expanded or extensive additional review of patient history:     Home Situation  Home Environment: Private residence  One/Two Story Residence: One story (with basement)  Living Alone: No  Support Systems: Spouse/Significant Other  Current DME Used/Available at Home: Cane, straight, Grab bars, Shower chair, Walker, rollator, Raised toilet seat  Tub or Shower Type: Shower (built in seat)    Hand dominance: Right    EXAMINATION OF PERFORMANCE DEFICITS:  Cognitive/Behavioral Status:  Neurologic State: Drowsy; Alert; Confused (initially drowsy)  Orientation Level: Oriented to person; Disoriented to place; Disoriented to situation; Disoriented to time (able to recall year but not month)  Cognition: Decreased attention/concentration; Follows commands; Impaired decision making; Poor safety awareness  Perception: Cues to maintain midline in sitting; Verbal  Perseveration: No perseveration noted  Safety/Judgement: Awareness of environment; Decreased awareness of need for assistance; Decreased awareness of need for safety; Decreased insight into deficits; Fall prevention    Skin: appears grossly intact    Edema: none noted in BUEs    Hearing: Auditory  Auditory Impairment: None    Vision/Perceptual:    Tracking: Able to track stimulus in all quadrants w/o difficulty    Diplopia: No    Acuity: Within Defined Limits      Range of Motion:  In BUEs  AROM: Generally decreased, functional    Strength: In BUEs  Strength: Generally decreased, functional    Coordination:  Coordination: Generally decreased, functional  Fine Motor Skills-Upper: Left Impaired; Right Impaired (baseline tremor)    Gross Motor Skills-Upper: Left Intact; Right Intact    Tone & Sensation:  In BUEs  Tone: Abnormal (resting tremors)  Sensation: Impaired (numbness in WENDY hands and feet at baseline)    Balance:  Sitting: Impaired  Sitting - Static: Fair (occasional)  Sitting - Dynamic: Fair (occasional); Poor (constant support)  Standing: Impaired;  With support  Standing - Static: Constant support; Fair; Poor  Standing - Dynamic : Constant support; Poor    Functional Mobility and Transfers for ADLs:  Bed Mobility:  Supine to Sit: Minimum assistance; Assist x2  Sit to Supine: Minimum assistance    Transfers:  Sit to Stand: Minimum assistance; Assist x2  Stand to Sit: Minimum assistance; Assist x2  Toilet Transfer : Moderate assistance; Assist x2 (to/from [facility])  Assistive Device : Gait Belt    ADL Assessment:  Feeding: Minimum assistance    Oral Facial Hygiene/Grooming: Minimum assistance    Bathing: Moderate assistance    Upper Body Dressing: Minimum assistance    Lower Body Dressing: Moderate assistance    Toileting: Moderate assistance    ADL Intervention and task modifications:  Feeding  Container Management: Total assistance (dependent)  Utensil Management: Set-up  Food to Mouth: Independent  Drink to Mouth: Independent  Cues: Physical assistance; Verbal cues provided    Lower Body Dressing Assistance  Socks: Moderate assistance  Leg Crossed Method Used: Yes  Position Performed: Seated edge of bed  Cues: Erminio Loach; Physical assistance; Verbal cues provided    Toileting  Bladder Hygiene: Maximum assistance (catheter)  Bowel Hygiene: Minimum assistance  Clothing Management: Maximum assistance  Cues: Physical assistance for pants down; Physical assistance for pants up; Verbal cues provided    Cognitive Retraining  Safety/Judgement: Awareness of environment; Decreased awareness of need for assistance; Decreased awareness of need for safety; Decreased insight into deficits; Fall prevention    Functional Measure:    Barthel Index:  Bathin  Bladder: 0  Bowels: 10  Groomin  Dressin  Feedin  Mobility: 0  Stairs: 0  Toilet Use: 5  Transfer (Bed to Chair and Back): 5  Total: 25/100      The Barthel ADL Index: Guidelines  1. The index should be used as a record of what a patient does, not as a record of what a patient could do.   2. The main aim is to establish degree of independence from any help, physical or verbal, however minor and for whatever reason. 3. The need for supervision renders the patient not independent. 4. A patient's performance should be established using the best available evidence. Asking the patient, friends/relatives and nurses are the usual sources, but direct observation and common sense are also important. However direct testing is not needed. 5. Usually the patient's performance over the preceding 24-48 hours is important, but occasionally longer periods will be relevant. 6. Middle categories imply that the patient supplies over 50 per cent of the effort. 7. Use of aids to be independent is allowed. Score Interpretation (from [facility])    Independent   60-79 Minimally independent   40-59 Partially dependent   20-39 Very dependent   <20 Totally dependent     -Brandon Ruiz., Barthel, DLeslieW. (1965). Functional evaluation: the Barthel Index. [facility] ([address] (1997). The Barthel activities of daily living index: self-reporting versus actual performance in the old (> or = 75 years). Journal [address] 45(7), [facility], J..LeslieF, Nova Hickman., Kodi Gonzalez. (1999). Measuring the change in disability after inpatient rehabilitation; comparison of the responsiveness of the Barthel Index and Functional [place] Measure. Journal of Neurology, Neurosurgery, and Psychiatry, 66(4), 075-179. KRISTI Ortiz, TAL Leyva, & Norma Andrea MPAM. (2004) Assessment of post-stroke quality of life in cost-effectiveness studies: The usefulness of the Barthel Index and the EuroQoL-5D.  Quality of Life Research, 15, 395-36     Occupational Therapy Evaluation Charge Determination   History Examination Decision-Making   LOW Complexity : Brief history review  MEDIUM Complexity : 3-5 performance deficits relating to physical, cognitive , or psychosocial skils that result in activity limitations and / or participation restrictions MEDIUM Complexity : Patient may present with comorbidities that affect occupational performnce. Miniml to moderate modification of tasks or assistance (eg, physical or verbal ) with assesment(s) is necessary to enable patient to complete evaluation       Based on the above components, the patient evaluation is determined to be of the following complexity level: MEDIUM  Pain Rating:  Reporting no pain    Activity Tolerance:   Fair and requires rest breaks    After treatment patient left in no apparent distress:    Call bell within reach, Bed / chair alarm activated, Side rails x 3, and sitting in chair position in bed, PCT bedside  to assist with feeding    COMMUNICATION/EDUCATION:   The patients plan of care was discussed with: Physical therapist and Registered nurse. Home safety education was provided and the patient/caregiver indicated understanding. and Patient/family have participated as able in goal setting and plan of care. This patients plan of care is appropriate for delegation to [facility].     Thank you for this referral.  Julian Gordillo OT  Time Calculation: 30 mins

## 2021-11-13 PROCEDURE — 74011000250 HC RX REV CODE- 250: Performed by: STUDENT IN AN ORGANIZED HEALTH CARE EDUCATION/TRAINING PROGRAM

## 2021-11-13 PROCEDURE — 74011636637 HC RX REV CODE- 636/637: Performed by: HOSPITALIST

## 2021-11-13 PROCEDURE — 74011250637 HC RX REV CODE- 250/637: Performed by: STUDENT IN AN ORGANIZED HEALTH CARE EDUCATION/TRAINING PROGRAM

## 2021-11-13 PROCEDURE — 74011250636 HC RX REV CODE- 250/636: Performed by: STUDENT IN AN ORGANIZED HEALTH CARE EDUCATION/TRAINING PROGRAM

## 2021-11-13 PROCEDURE — 94640 AIRWAY INHALATION TREATMENT: CPT

## 2021-11-13 PROCEDURE — 65660000000 HC RM CCU STEPDOWN

## 2021-11-13 RX ADMIN — LORAZEPAM 4 MG: 2 INJECTION INTRAMUSCULAR; INTRAVENOUS at 19:45

## 2021-11-13 RX ADMIN — Medication 10 ML: at 23:34

## 2021-11-13 RX ADMIN — PREDNISONE 20 MG: 20 TABLET ORAL at 10:00

## 2021-11-13 RX ADMIN — MULTIPLE VITAMINS W/ MINERALS TAB 1 TABLET: TAB at 10:00

## 2021-11-13 RX ADMIN — BUDESONIDE 500 MCG: 0.5 INHALANT RESPIRATORY (INHALATION) at 07:54

## 2021-11-13 RX ADMIN — CHLORDIAZEPOXIDE HYDROCHLORIDE 50 MG: 25 CAPSULE ORAL at 10:00

## 2021-11-13 RX ADMIN — FOLIC ACID 1 MG: 1 TABLET ORAL at 10:00

## 2021-11-13 RX ADMIN — ARFORMOTEROL TARTRATE 15 MCG: 15 SOLUTION RESPIRATORY (INHALATION) at 20:32

## 2021-11-13 RX ADMIN — ONDANSETRON HYDROCHLORIDE 4 MG: 2 SOLUTION INTRAMUSCULAR; INTRAVENOUS at 10:00

## 2021-11-13 RX ADMIN — LORAZEPAM 2 MG: 2 INJECTION INTRAMUSCULAR; INTRAVENOUS at 06:48

## 2021-11-13 RX ADMIN — BUDESONIDE 500 MCG: 0.5 INHALANT RESPIRATORY (INHALATION) at 20:32

## 2021-11-13 RX ADMIN — CHLORDIAZEPOXIDE HYDROCHLORIDE 50 MG: 25 CAPSULE ORAL at 23:31

## 2021-11-13 RX ADMIN — LORAZEPAM 2 MG: 2 INJECTION INTRAMUSCULAR; INTRAVENOUS at 23:31

## 2021-11-13 RX ADMIN — LORAZEPAM 2 MG: 2 INJECTION INTRAMUSCULAR; INTRAVENOUS at 11:17

## 2021-11-13 RX ADMIN — Medication 100 MG: at 10:00

## 2021-11-13 RX ADMIN — ENOXAPARIN SODIUM 40 MG: 100 INJECTION SUBCUTANEOUS at 10:00

## 2021-11-13 RX ADMIN — ARFORMOTEROL TARTRATE 15 MCG: 15 SOLUTION RESPIRATORY (INHALATION) at 07:54

## 2021-11-13 NOTE — PROGRESS NOTES
Bedside shift change report given to Jasmyne Mortno (oncoming nurse) by Avelina Mary RN (offgoing nurse). Report included the following information SBAR and Kardex.

## 2021-11-13 NOTE — PROGRESS NOTES
[facility] Adult  Hospitalist Group                                                                                          Hospitalist Progress Note  Deng Kwok MD  Answering service: [phone] from in house phone        Date of Service:  2021  NAME:  Moose Tena  :  1950  MRN:  [id number]      Admission Summary:   Moose Tena is a 70 y.o. male past medical history of asthma, history of PE complicated by hematemesis for which Eliquis was held, EtOH abuse, chronic essential tremor presents with primary complaint of shortness of breath. [facility]. States he presented to take his albuterol inhaler multiple times without relief. Denies fever chills, cough, chest pain. States he is a chronic drinker of vodka. States he drinks anywhere from two 2 ounce glasses a day to 4-6 on weekends although seemed rather reluctant to tell me the true amount he takes. States he \"thinks\" his last drink may have been yesterday. Denies use of recreational drugs. States he still currently feels short of breath. Vaccinated for Covid.      When I evaluated patient, he was tachycardic heart rate 115's, blood pressure stable, saturating well on RA. Received 1 mg of Ativan . ethanol elevated to 168. Labs notable for hypokalemia with a K of 2.7. Mag 1.2. CXR showed no acute process. Interval history / Subjective:    Patient seen and examined at bedside.      Alert and oriented X 2- himself,place   Denied any pain  In restraints due to fall risk     Assessment & Plan:     Acute alcohol withdrawal-  On librium  -continue CIWA protocol and prn ativan  Ativan requirements have decreased in the last 24 hrs-received 8 mg     Asthma exacerbation-resolved  -now stable on room air  Pulm signed off, decrease to 20 mg prednisone  -can be d.c'd c and f/u with pulmonary patient [facility] pulm medicine from pul standpt  -flu and covid neg  -Continue duo nebs, Pulmicort, can resume home doses on d/c       Hypokalemia, hypomagnesemia  Repleted as needed       History of PE complicated by developing hematemesis for which Eliquis was held PTA  -Currently stable on room air saturating well     EtOH abuse  Counseled on abstaining       Diet: Regular   DVT PPx: Lovenox subcu  Code: Full        Care Plan discussed with: Patient/Family and Nurse  Anticipated Disposition: rehab  Anticipated Discharge:>48 hrs      Discussed with wife      Hospital Problems  Date Reviewed: 10/25/2021          Codes Class Noted POA    Alcohol withdrawal ([facility] ICD-10-CM: A02.904  ICD-9-CM: 291.81  11/9/2021 Unknown        COPD (chronic obstructive pulmonary disease) ([facility] ICD-10-CM: J44.9  ICD-9-CM: 742  11/9/2021 Unknown        COPD exacerbation ([facility] ICD-10-CM: J44.1  ICD-9-CM: 491.21  11/9/2021 Unknown                Review of Systems:   Unable to obtain      Vital Signs:    Last 24hrs VS reviewed since prior progress note. Most recent are:  Visit Vitals  /74 (BP 1 Location: Left upper arm, BP Patient Position: At rest)   Pulse (!) 109   Temp 98.4 °F (36.9 °C)   Resp 20   Ht 5' 7\" (1.702 m)   Wt 72.8 kg (160 lb 7.9 oz)   SpO2 96%   BMI 25.14 kg/m²         Intake/Output Summary (Last 24 hours) at 11/13/2021 1119  Last data filed at 11/13/2021 0331  Gross per 24 hour   Intake    Output 1600 ml   Net -1600 ml        Physical Examination:     I had a face to face encounter with this patient and independently examined them on 11/13/2021 as outlined below:          Constitutional:  in restraints, no distress   ENT:  Normal conjunctiva,anciteric sclera   Resp:  CTA bilaterally. No wheezing/rhonchi/rales.  No accessory muscle use   CV:  tachy, normal rate,S1, S2 wnl    GI:  Soft, non distended, non tender,bowel sounds +    Musculoskeletal:  No LE edema    Neurologic: Alert and oriented X 2, moves all extremities            Data Review:    Review and/or order of clinical lab test    Review and/or order of tests in the medicine section of [facility]      Labs:     Recent Labs     11/12/21 0357 11/11/21  0547   WBC 3.4* 4.1   HGB 9.1* 8.9*   HCT 28.3* 27.9*   * 109*     Recent Labs     11/12/21 0357 11/11/21  0547    141   K 3.5 3.7   * 114*   CO2 26 23   BUN 6 6   CREA 0.67* 0.70   GLU 98 91   CA 8.4* 8.4*   MG 2.0  --    PHOS 3.4  --      No results for input(s): ALT, AP, TBIL, TBILI, TP, ALB, GLOB, GGT, AML, LPSE in the last 72 hours. No lab exists for component: SGOT, GPT, AMYP, HLPSE  No results for input(s): INR, PTP, APTT, INREXT, INREXT in the last 72 hours. No results for input(s): FE, TIBC, PSAT, FERR in the last 72 hours. No results found for: FOL, RBCF   No results for input(s): PH, PCO2, PO2 in the last 72 hours. No results for input(s): CPK, CKNDX, TROIQ in the last 72 hours.     No lab exists for component: CPKMB  No results found for: CHOL, CHOLX, CHLST, CHOLV, HDL, HDLP, LDL, LDLC, DLDLP, TGLX, TRIGL, TRIGP, CHHD, CHHDX  Lab Results   Component Value Date/Time    Glucose (POC) 185 (H) 10/26/2021 09:00 AM    Glucose (POC) 152 (H) 10/25/2021 06:42 AM     No results found for: COLOR, APPRN, SPGRU, REFSG, RUSLAN, PROTU, GLUCU, KETU, BILU, UROU, ESTRELLA, LEUKU, GLUKE, EPSU, BACTU, WBCU, RBCU, CASTS, UCRY      Medications Reviewed:     Current Facility-Administered Medications   Medication Dose Route Frequency    predniSONE (DELTASONE) tablet 20 mg  20 mg Oral DAILY WITH BREAKFAST    albuterol-ipratropium (DUO-NEB) 2.5 MG-0.5 MG/3 ML  3 mL Nebulization Q4H PRN    chlordiazePOXIDE (LIBRIUM) capsule 50 mg  50 mg Oral Q12H    albuterol (PROVENTIL VENTOLIN) nebulizer solution 2.5 mg  2.5 mg Inhalation Q4H PRN    sodium chloride (NS) flush 5-40 mL  5-40 mL IntraVENous Q8H    sodium chloride (NS) flush 5-40 mL  5-40 mL IntraVENous PRN    acetaminophen (TYLENOL) tablet 650 mg  650 mg Oral Q6H PRN    Or    acetaminophen (TYLENOL) suppository 650 mg  650 mg Rectal Q6H PRN    polyethylene glycol (MIRALAX) packet 17 g  17 g Oral DAILY PRN    ondansetron (ZOFRAN ODT) tablet 4 mg  4 mg Oral Q8H PRN    Or    ondansetron (ZOFRAN) injection 4 mg  4 mg IntraVENous Q6H PRN    enoxaparin (LOVENOX) injection 40 mg  40 mg SubCUTAneous DAILY    budesonide (PULMICORT) 500 mcg/2 ml nebulizer suspension  500 mcg Nebulization BID RT    arformoteroL (BROVANA) neb solution 15 mcg  2 mL Nebulization BID RT    multivitamin, tx-iron-ca-min (THERA-M w/ IRON) tablet 1 Tablet  1 Tablet Oral DAILY    LORazepam (ATIVAN) injection 2 mg  2 mg IntraVENous Q1H PRN    LORazepam (ATIVAN) injection 4 mg  4 mg IntraVENous Q1H PRN    thiamine HCL (B-1) tablet 100 mg  100 mg Oral DAILY    folic acid (FOLVITE) tablet 1 mg  1 mg Oral DAILY    0.9% sodium chloride infusion  75 mL/hr IntraVENous CONTINUOUS    calcium carbonate (TUMS) chewable tablet 200 mg [elemental]  200 mg Oral PRN     ______________________________________________________________________  EXPECTED LENGTH OF STAY: 3d 9h  ACTUAL LENGTH OF STAY:          3                 Imani Palomares MD

## 2021-11-13 NOTE — PROGRESS NOTES
Problem: Falls - Risk of  Goal: *Absence of Falls  Description: Document Hunter Mustafa Fall Risk and appropriate interventions in the flowsheet. Outcome: Progressing Towards Goal  Note: Fall Risk Interventions:  Mobility Interventions: Bed/chair exit alarm, OT consult for ADLs, Strengthening exercises (ROM-active/passive), Utilize walker, cane, or other assistive device    Mentation Interventions: Adequate sleep, hydration, pain control, Bed/chair exit alarm, Door open when patient unattended, Evaluate medications/consider consulting pharmacy, Increase mobility, More frequent rounding, Reorient patient, Room close to nurse's station, Update white board    Medication Interventions: Bed/chair exit alarm, Evaluate medications/consider consulting pharmacy, Patient to call before getting OOB, Teach patient to arise slowly    Elimination Interventions: Bed/chair exit alarm, Call light in reach, Patient to call for help with toileting needs, Stay With Me (per policy), Toilet paper/wipes in reach, Toileting schedule/hourly rounds    History of Falls Interventions: Bed/chair exit alarm, Door open when patient unattended, Evaluate medications/consider consulting pharmacy, Room close to nurse's station         Problem: Pressure Injury - Risk of  Goal: *Prevention of pressure injury  Description: Document Papo Scale and appropriate interventions in the flowsheet.   Outcome: Progressing Towards Goal  Note: Pressure Injury Interventions:  Sensory Interventions: Assess changes in LOC    Moisture Interventions: Absorbent underpads, Apply protective barrier, creams and emollients, Check for incontinence Q2 hours and as needed, Internal/External urinary devices, Maintain skin hydration (lotion/cream), Minimize layers, Moisture barrier    Activity Interventions: Increase time out of bed, Pressure redistribution bed/mattress(bed type)    Mobility Interventions: Float heels, HOB 30 degrees or less, Pressure redistribution bed/mattress (bed type), Turn and reposition approx.  every two hours(pillow and wedges)    Nutrition Interventions: Document food/fluid/supplement intake, Discuss nutritional consult with provider    Friction and Shear Interventions: Apply protective barrier, creams and emollients, Foam dressings/transparent film/skin sealants, HOB 30 degrees or less, Lift sheet, Lift team/patient mobility team, Minimize layers, Transferring/repositioning devices

## 2021-11-13 NOTE — PROGRESS NOTES
ERICH- Pending referral to [facility]    Reason for Admission:    Shortness of breath                    RUR Score:    15%              PCP: First and Last name:   Dede Wu MD     Name of Practice:    Are you a current patient: Yes/No:    Approximate date of last visit:    Can you participate in a virtual visit if needed:     Do you (patient/family) have any concerns for transition/discharge? no                Plan for utilizing home health:   TBD    Current Advanced Directive/Advance Care Plan:  Full Code      Healthcare Decision Maker:   Click here to complete [address] including selection of the Healthcare Decision Maker Relationship (ie \"Primary\")              Transition of Care Plan:   CM spoke with pt to introduce him to the role of CM. Pt deferred to his wife. Cm called pt's wife, Rubin Medeiros, to introduce her to the role of CM and transition of care. This pt lives at home with his wife. Prior to admission, this pt was independent with ADL's and IADL's. He is active and still drives. CM informed pt's wife that the therapists are recommending inpatient rehab for this pt. CM offered choice and she would like a referral to be sent to [facility]. CM sent a referral to [address] [place] [address] via [address] [place] [address],Ground Floor. [facility] 9W Management Interventions  PCP Verified by CM:  Yes  Palliative Care Criteria Met (RRAT>21 & CHF Dx)?: No  Transition of Care Consult (CM Consult): Discharge Planning  MyChart Signup: No  Discharge Durable Medical Equipment: No  Physical Therapy Consult: No  Occupational Therapy Consult: No  Speech Therapy Consult: No  Support Systems: Spouse/Significant Other  Confirm Follow Up Transport: Family  The Plan for Transition of Care is Related to the Following Treatment Goals : safe d/c  The Patient and/or Patient Representative was Provided with a Choice of Provider and Agrees with the Discharge Plan?: Yes  Freedom of Choice List was Provided with Basic Dialogue that Supports the Patient's Individualized Plan of Care/Goals, Treatment Preferences and Shares the Quality Data Associated with the Providers?: Yes  The [employer] Information Provided?: No

## 2021-11-13 NOTE — ROUTINE PROCESS
Bedside shift change report given to Jason Higgins (oncoming nurse) by Manju (offgoing nurse). Report included the following information SBAR, Kardex, Intake/Output, MAR and Recent Results.

## 2021-11-14 PROCEDURE — 74011250636 HC RX REV CODE- 250/636: Performed by: STUDENT IN AN ORGANIZED HEALTH CARE EDUCATION/TRAINING PROGRAM

## 2021-11-14 PROCEDURE — 94640 AIRWAY INHALATION TREATMENT: CPT

## 2021-11-14 PROCEDURE — 74011636637 HC RX REV CODE- 636/637: Performed by: HOSPITALIST

## 2021-11-14 PROCEDURE — 74011250637 HC RX REV CODE- 250/637: Performed by: STUDENT IN AN ORGANIZED HEALTH CARE EDUCATION/TRAINING PROGRAM

## 2021-11-14 PROCEDURE — 74011000250 HC RX REV CODE- 250: Performed by: STUDENT IN AN ORGANIZED HEALTH CARE EDUCATION/TRAINING PROGRAM

## 2021-11-14 PROCEDURE — 65660000000 HC RM CCU STEPDOWN

## 2021-11-14 RX ADMIN — LORAZEPAM 2 MG: 2 INJECTION INTRAMUSCULAR; INTRAVENOUS at 05:43

## 2021-11-14 RX ADMIN — ENOXAPARIN SODIUM 40 MG: 100 INJECTION SUBCUTANEOUS at 11:42

## 2021-11-14 RX ADMIN — BUDESONIDE 500 MCG: 0.5 INHALANT RESPIRATORY (INHALATION) at 07:51

## 2021-11-14 RX ADMIN — CHLORDIAZEPOXIDE HYDROCHLORIDE 50 MG: 25 CAPSULE ORAL at 11:42

## 2021-11-14 RX ADMIN — ALBUTEROL SULFATE 2.5 MG: 2.5 SOLUTION RESPIRATORY (INHALATION) at 05:46

## 2021-11-14 RX ADMIN — CHLORDIAZEPOXIDE HYDROCHLORIDE 50 MG: 25 CAPSULE ORAL at 21:30

## 2021-11-14 RX ADMIN — Medication 10 ML: at 21:32

## 2021-11-14 RX ADMIN — LORAZEPAM 4 MG: 2 INJECTION INTRAMUSCULAR; INTRAVENOUS at 12:37

## 2021-11-14 RX ADMIN — ARFORMOTEROL TARTRATE 15 MCG: 15 SOLUTION RESPIRATORY (INHALATION) at 07:51

## 2021-11-14 RX ADMIN — LORAZEPAM 4 MG: 2 INJECTION INTRAMUSCULAR; INTRAVENOUS at 23:27

## 2021-11-14 RX ADMIN — Medication 100 MG: at 11:42

## 2021-11-14 RX ADMIN — FOLIC ACID 1 MG: 1 TABLET ORAL at 11:42

## 2021-11-14 RX ADMIN — CALCIUM CARBONATE (ANTACID) CHEW TAB 500 MG 200 MG: 500 CHEW TAB at 21:28

## 2021-11-14 RX ADMIN — PREDNISONE 20 MG: 20 TABLET ORAL at 11:45

## 2021-11-14 RX ADMIN — ARFORMOTEROL TARTRATE 15 MCG: 15 SOLUTION RESPIRATORY (INHALATION) at 19:29

## 2021-11-14 RX ADMIN — BUDESONIDE 500 MCG: 0.5 INHALANT RESPIRATORY (INHALATION) at 19:29

## 2021-11-14 RX ADMIN — MULTIPLE VITAMINS W/ MINERALS TAB 1 TABLET: TAB at 11:42

## 2021-11-14 RX ADMIN — LORAZEPAM 4 MG: 2 INJECTION INTRAMUSCULAR; INTRAVENOUS at 21:30

## 2021-11-14 NOTE — PROGRESS NOTES
Physician Progress Note      PATIENT:               [facility] #:                  [id number]  :                       1950  ADMIT DATE:       2021 2:06 AM  DISCH DATE:  RESPONDING  PROVIDER #:        Smamy Navarro MD          QUERY TEXT:    Pt admitted with COPD . Pt noted to have ETOH withdrawal  If possible, please document in the progress notes and discharge summary if you are evaluating and / or treating any of the following: The medical record reflects the following:  Risk Factors: ETOH  Clinical Indicators:  11/10 nurses note  0200: Pt becoming extremely restless and agitated, trying to get up out of bed constantly, yelling at and pushing the staff with each attempt to redirect. 4mg of IV ativan was given at 0131 for a CIWA score of 14 and I (RN) have been sitting with the pt ever since as his agitation has only gotten worse since then. Charge RN Notified Ursula Mondragon MD of pt's increasing agitation and restlessness. Orders received to start bilateral soft wrist restraints. ?  2693: Notified and educated pt's wife, Preston Stevens. Amira, about initiation of bilateral wrist restraints and their use as a safety precaution. [address] stated that she understands and consents to this order. Treatment: 5mg of haldol IM, bilateral wrist restraints , high fall risk precautions    Thank you,  Tori Whitman RN, CDI, [facility], CCDS  Certified Clinical   [phone]  Options provided:  -- Alcoholic encephalopathy  -- Alcoholic delirium  -- Delirium due to, Please specify cause. -- Other - I will add my own diagnosis  -- Disagree - Not applicable / Not valid  -- Disagree - Clinically unable to determine / Unknown  -- Refer to Clinical Documentation Reviewer    PROVIDER RESPONSE TEXT:    This patient has alcoholic delirium .     Query created by: Evelyn Deluna on 2021 10:54 AM      Electronically signed by:  Sammy Navarro MD 2021 10:58 AM

## 2021-11-14 NOTE — PROGRESS NOTES
[address] [facility] Adult  Hospitalist Group                                                                                          Hospitalist Progress Note  Adron Gowers, MD  Answering service: [phone] from in house phone        Date of Service:  2021  NAME:  Shavonne Patel  :  1950  MRN:  [id number]      Admission Summary:   Shavonne Patel is a 70 y.o. male past medical history of asthma, history of PE complicated by hematemesis for which Eliquis was held, EtOH abuse, chronic essential tremor presents with primary complaint of shortness of breath. [facility]. States he presented to take his albuterol inhaler multiple times without relief. Denies fever chills, cough, chest pain. States he is a chronic drinker of vodka. States he drinks anywhere from two 2 ounce glasses a day to 4-6 on weekends although seemed rather reluctant to tell me the true amount he takes. States he \"thinks\" his last drink may have been yesterday. Denies use of recreational drugs. States he still currently feels short of breath. Vaccinated for Covid.      When I evaluated patient, he was tachycardic heart rate 115's, blood pressure stable, saturating well on RA. Received 1 mg of Ativan . ethanol elevated to 168. Labs notable for hypokalemia with a K of 2.7. Mag 1.2. CXR showed no acute process. Interval history / Subjective:    Patient seen and examined at bedside.      Remains alert and oriented X 2, denied pain,SOB  Off restraints     Assessment & Plan:     Acute alcohol withdrawal-improving  On librium  -continue CIWA protocol and prn ativan  Ativan requirements have decreased in the last 24 hrs-received 6 mg     Asthma exacerbation-resolved  -now stable on room air  Pulm signed off, decrease to 20 mg prednisone  -can be d.c'd c and f/u with pulmonary patient [facility] pulm medicine from pul standpt  -flu and covid neg  -Continue duo nebs, Pulmicort, can resume home doses on d/c       Hypokalemia, hypomagnesemia-imrpoved  Repleted as needed       History of PE complicated by developing hematemesis for which Eliquis was held PTA  -Currently stable on room air saturating well     EtOH abuse  was counseled on abstaining       Diet: Regular   DVT PPx: Lovenox subcu  Code: Full        Care Plan discussed with: Patient/Family and Nurse  Anticipated Disposition: rehab  Anticipated Discharge:>48 hrs         Hospital Problems  Date Reviewed: 10/25/2021          Codes Class Noted POA    Alcohol withdrawal ([facility] ICD-10-CM: B06.022  ICD-9-CM: 291.81  11/9/2021 Unknown        COPD (chronic obstructive pulmonary disease) ([facility] ICD-10-CM: J44.9  ICD-9-CM: 582  11/9/2021 Unknown        COPD exacerbation ([facility] ICD-10-CM: J44.1  ICD-9-CM: 491.21  11/9/2021 Unknown                Review of Systems:   Unable to obtain      Vital Signs:    Last 24hrs VS reviewed since prior progress note. Most recent are:  Visit Vitals  /71 (BP 1 Location: Left upper arm, BP Patient Position: At rest)   Pulse (!) 111   Temp 97.9 °F (36.6 °C)   Resp 17   Ht 5' 7\" (1.702 m)   Wt 72.8 kg (160 lb 7.9 oz)   SpO2 98%   BMI 25.14 kg/m²         Intake/Output Summary (Last 24 hours) at 11/14/2021 1314  Last data filed at 11/14/2021 7782  Gross per 24 hour   Intake    Output 1200 ml   Net -1200 ml        Physical Examination:     I had a face to face encounter with this patient and independently examined them on 11/14/2021 as outlined below:          Constitutional:  no distress   ENT:  Normal conjunctiva,anciteric sclera   Resp:  CTA bilaterally. No wheezing/rhonchi/rales.  No accessory muscle use   CV:  regular rhythym, normal rate,S1, S2 wnl    GI:  Soft, non distended, non tender,bowel sounds +    Musculoskeletal:  No LE edema    Neurologic: Alert and oriented X 2, moves all extremities            Data Review:    Review and/or order of clinical lab test    Review and/or order of tests in the medicine section of CPT      Labs:     Recent Labs     11/12/21  0357   WBC 3.4*   HGB 9.1*   HCT 28.3*   *     Recent Labs     11/12/21  0357      K 3.5   *   CO2 26   BUN 6   CREA 0.67*   GLU 98   CA 8.4*   MG 2.0   PHOS 3.4     No results for input(s): ALT, AP, TBIL, TBILI, TP, ALB, GLOB, GGT, AML, LPSE in the last 72 hours. No lab exists for component: SGOT, GPT, AMYP, HLPSE  No results for input(s): INR, PTP, APTT, INREXT, INREXT in the last 72 hours. No results for input(s): FE, TIBC, PSAT, FERR in the last 72 hours. No results found for: FOL, RBCF   No results for input(s): PH, PCO2, PO2 in the last 72 hours. No results for input(s): CPK, CKNDX, TROIQ in the last 72 hours.     No lab exists for component: CPKMB  No results found for: CHOL, CHOLX, CHLST, CHOLV, HDL, HDLP, LDL, LDLC, DLDLP, TGLX, TRIGL, TRIGP, CHHD, CHHDX  Lab Results   Component Value Date/Time    Glucose (POC) 185 (H) 10/26/2021 09:00 AM    Glucose (POC) 152 (H) 10/25/2021 06:42 AM     No results found for: COLOR, APPRN, SPGRU, REFSG, RUSLAN, PROTU, GLUCU, KETU, BILU, UROU, ESTRELLA, LEUKU, GLUKE, EPSU, BACTU, WBCU, RBCU, CASTS, UCRY      Medications Reviewed:     Current Facility-Administered Medications   Medication Dose Route Frequency    predniSONE (DELTASONE) tablet 20 mg  20 mg Oral DAILY WITH BREAKFAST    albuterol-ipratropium (DUO-NEB) 2.5 MG-0.5 MG/3 ML  3 mL Nebulization Q4H PRN    chlordiazePOXIDE (LIBRIUM) capsule 50 mg  50 mg Oral Q12H    albuterol (PROVENTIL VENTOLIN) nebulizer solution 2.5 mg  2.5 mg Inhalation Q4H PRN    sodium chloride (NS) flush 5-40 mL  5-40 mL IntraVENous Q8H    sodium chloride (NS) flush 5-40 mL  5-40 mL IntraVENous PRN    acetaminophen (TYLENOL) tablet 650 mg  650 mg Oral Q6H PRN    Or    acetaminophen (TYLENOL) suppository 650 mg  650 mg Rectal Q6H PRN    polyethylene glycol (MIRALAX) packet 17 g  17 g Oral DAILY PRN    ondansetron (ZOFRAN ODT) tablet 4 mg  4 mg Oral Q8H PRN    Or    ondansetron (ZOFRAN) injection 4 mg  4 mg IntraVENous Q6H PRN    enoxaparin (LOVENOX) injection 40 mg  40 mg SubCUTAneous DAILY    budesonide (PULMICORT) 500 mcg/2 ml nebulizer suspension  500 mcg Nebulization BID RT    arformoteroL (BROVANA) neb solution 15 mcg  2 mL Nebulization BID RT    multivitamin, tx-iron-ca-min (THERA-M w/ IRON) tablet 1 Tablet  1 Tablet Oral DAILY    LORazepam (ATIVAN) injection 2 mg  2 mg IntraVENous Q1H PRN    LORazepam (ATIVAN) injection 4 mg  4 mg IntraVENous Q1H PRN    thiamine HCL (B-1) tablet 100 mg  100 mg Oral DAILY    folic acid (FOLVITE) tablet 1 mg  1 mg Oral DAILY    0.9% sodium chloride infusion  75 mL/hr IntraVENous CONTINUOUS    calcium carbonate (TUMS) chewable tablet 200 mg [elemental]  200 mg Oral PRN     ______________________________________________________________________  EXPECTED LENGTH OF STAY: 3d 9h  ACTUAL LENGTH OF STAY:          4                 Xavier Chandler MD

## 2021-11-14 NOTE — PROGRESS NOTES
Bedside shift change report given to Mabel Carrero (oncoming nurse) by Sony Ko RN (offgoing nurse). Report included the following information SBAR and Kardex.

## 2021-11-14 NOTE — ROUTINE PROCESS
Bedside shift change report given to Sarai Kwok (oncoming nurse) by Tea Rose (offgoing nurse). Report included the following information SBAR, Kardex, Intake/Output, MAR and Recent Results.

## 2021-11-15 LAB
ALBUMIN SERPL-MCNC: 2.2 G/DL (ref 3.5–5)
ANION GAP SERPL CALC-SCNC: 2 MMOL/L (ref 5–15)
BASOPHILS # BLD: 0 K/UL (ref 0–0.1)
BASOPHILS NFR BLD: 0 % (ref 0–1)
BUN SERPL-MCNC: 13 MG/DL (ref 6–20)
BUN/CREAT SERPL: 20 (ref 12–20)
CALCIUM SERPL-MCNC: 8.3 MG/DL (ref 8.5–10.1)
CHLORIDE SERPL-SCNC: 113 MMOL/L (ref 97–108)
CO2 SERPL-SCNC: 27 MMOL/L (ref 21–32)
CREAT SERPL-MCNC: 0.65 MG/DL (ref 0.7–1.3)
DIFFERENTIAL METHOD BLD: ABNORMAL
EOSINOPHIL # BLD: 0 K/UL (ref 0–0.4)
EOSINOPHIL NFR BLD: 0 % (ref 0–7)
ERYTHROCYTE [DISTWIDTH] IN BLOOD BY AUTOMATED COUNT: 14.8 % (ref 11.5–14.5)
GLUCOSE SERPL-MCNC: 102 MG/DL (ref 65–100)
HCT VFR BLD AUTO: 27.6 % (ref 36.6–50.3)
HGB BLD-MCNC: 8.9 G/DL (ref 12.1–17)
IMM GRANULOCYTES # BLD AUTO: 0 K/UL (ref 0–0.04)
IMM GRANULOCYTES NFR BLD AUTO: 0 % (ref 0–0.5)
LYMPHOCYTES # BLD: 0.8 K/UL (ref 0.8–3.5)
LYMPHOCYTES NFR BLD: 24 % (ref 12–49)
MCH RBC QN AUTO: 31.4 PG (ref 26–34)
MCHC RBC AUTO-ENTMCNC: 32.2 G/DL (ref 30–36.5)
MCV RBC AUTO: 97.5 FL (ref 80–99)
MONOCYTES # BLD: 0.3 K/UL (ref 0–1)
MONOCYTES NFR BLD: 8 % (ref 5–13)
NEUTS SEG # BLD: 2.3 K/UL (ref 1.8–8)
NEUTS SEG NFR BLD: 68 % (ref 32–75)
NRBC # BLD: 0 K/UL (ref 0–0.01)
NRBC BLD-RTO: 0 PER 100 WBC
PHOSPHATE SERPL-MCNC: 3 MG/DL (ref 2.6–4.7)
PLATELET # BLD AUTO: 114 K/UL (ref 150–400)
PMV BLD AUTO: 9.8 FL (ref 8.9–12.9)
POTASSIUM SERPL-SCNC: 3.8 MMOL/L (ref 3.5–5.1)
RBC # BLD AUTO: 2.83 M/UL (ref 4.1–5.7)
SODIUM SERPL-SCNC: 142 MMOL/L (ref 136–145)
WBC # BLD AUTO: 3.5 K/UL (ref 4.1–11.1)

## 2021-11-15 PROCEDURE — 94640 AIRWAY INHALATION TREATMENT: CPT

## 2021-11-15 PROCEDURE — 65660000000 HC RM CCU STEPDOWN

## 2021-11-15 PROCEDURE — 74011250637 HC RX REV CODE- 250/637: Performed by: STUDENT IN AN ORGANIZED HEALTH CARE EDUCATION/TRAINING PROGRAM

## 2021-11-15 PROCEDURE — 74011636637 HC RX REV CODE- 636/637: Performed by: HOSPITALIST

## 2021-11-15 PROCEDURE — 80069 RENAL FUNCTION PANEL: CPT

## 2021-11-15 PROCEDURE — 74011250636 HC RX REV CODE- 250/636: Performed by: STUDENT IN AN ORGANIZED HEALTH CARE EDUCATION/TRAINING PROGRAM

## 2021-11-15 PROCEDURE — 74011000250 HC RX REV CODE- 250: Performed by: STUDENT IN AN ORGANIZED HEALTH CARE EDUCATION/TRAINING PROGRAM

## 2021-11-15 PROCEDURE — 85025 COMPLETE CBC W/AUTO DIFF WBC: CPT

## 2021-11-15 PROCEDURE — 74011250637 HC RX REV CODE- 250/637: Performed by: HOSPITALIST

## 2021-11-15 PROCEDURE — 36415 COLL VENOUS BLD VENIPUNCTURE: CPT

## 2021-11-15 RX ORDER — CHLORDIAZEPOXIDE HYDROCHLORIDE 25 MG/1
25 CAPSULE, GELATIN COATED ORAL EVERY 12 HOURS
Status: DISCONTINUED | OUTPATIENT
Start: 2021-11-15 | End: 2021-11-18

## 2021-11-15 RX ADMIN — MULTIPLE VITAMINS W/ MINERALS TAB 1 TABLET: TAB at 08:35

## 2021-11-15 RX ADMIN — ARFORMOTEROL TARTRATE 15 MCG: 15 SOLUTION RESPIRATORY (INHALATION) at 07:21

## 2021-11-15 RX ADMIN — ENOXAPARIN SODIUM 40 MG: 100 INJECTION SUBCUTANEOUS at 08:35

## 2021-11-15 RX ADMIN — PREDNISONE 20 MG: 20 TABLET ORAL at 07:07

## 2021-11-15 RX ADMIN — LORAZEPAM 4 MG: 2 INJECTION INTRAMUSCULAR; INTRAVENOUS at 05:07

## 2021-11-15 RX ADMIN — Medication 10 ML: at 16:55

## 2021-11-15 RX ADMIN — BUDESONIDE 500 MCG: 0.5 INHALANT RESPIRATORY (INHALATION) at 20:38

## 2021-11-15 RX ADMIN — CHLORDIAZEPOXIDE HYDROCHLORIDE 25 MG: 25 CAPSULE ORAL at 21:38

## 2021-11-15 RX ADMIN — ONDANSETRON HYDROCHLORIDE 4 MG: 2 SOLUTION INTRAMUSCULAR; INTRAVENOUS at 20:18

## 2021-11-15 RX ADMIN — Medication 100 MG: at 08:35

## 2021-11-15 RX ADMIN — LORAZEPAM 2 MG: 2 INJECTION INTRAMUSCULAR; INTRAVENOUS at 20:18

## 2021-11-15 RX ADMIN — ONDANSETRON HYDROCHLORIDE 4 MG: 2 SOLUTION INTRAMUSCULAR; INTRAVENOUS at 10:14

## 2021-11-15 RX ADMIN — Medication 10 ML: at 05:07

## 2021-11-15 RX ADMIN — LORAZEPAM 2 MG: 2 INJECTION INTRAMUSCULAR; INTRAVENOUS at 10:14

## 2021-11-15 RX ADMIN — BUDESONIDE 500 MCG: 0.5 INHALANT RESPIRATORY (INHALATION) at 07:21

## 2021-11-15 RX ADMIN — LORAZEPAM 4 MG: 2 INJECTION INTRAMUSCULAR; INTRAVENOUS at 01:28

## 2021-11-15 RX ADMIN — Medication 10 ML: at 21:38

## 2021-11-15 RX ADMIN — LORAZEPAM 2 MG: 2 INJECTION INTRAMUSCULAR; INTRAVENOUS at 11:39

## 2021-11-15 RX ADMIN — FOLIC ACID 1 MG: 1 TABLET ORAL at 08:35

## 2021-11-15 RX ADMIN — CHLORDIAZEPOXIDE HYDROCHLORIDE 50 MG: 25 CAPSULE ORAL at 08:35

## 2021-11-15 RX ADMIN — ARFORMOTEROL TARTRATE 15 MCG: 15 SOLUTION RESPIRATORY (INHALATION) at 20:38

## 2021-11-15 RX ADMIN — LORAZEPAM 2 MG: 2 INJECTION INTRAMUSCULAR; INTRAVENOUS at 08:39

## 2021-11-15 RX ADMIN — LORAZEPAM 2 MG: 2 INJECTION INTRAMUSCULAR; INTRAVENOUS at 18:38

## 2021-11-15 NOTE — PROGRESS NOTES
Occupational Therapy    Chart reviewed in prep for skilled OT treatment; however, RN reports pt with N&V this A. M., with medication administered, requesting therapy to hold at this time. Will defer and follow up later as able and appropriate.     Thank you,  Yu Hickman, OT

## 2021-11-15 NOTE — PROGRESS NOTES
Physical Therapy    PT treatment deferred after discussion with RN due to pt recently vomiting, just received zofran and ativan, unable to tolerate therapy at this time. Will con't to follow.     Clemencia Maier, PT, MPT

## 2021-11-15 NOTE — PROGRESS NOTES
Patient has been out of restraints since 0800 11/14/2021. Problem: Falls - Risk of  Goal: *Absence of Falls  Description: Document Graciela Reese Fall Risk and appropriate interventions in the flowsheet. Outcome: Progressing Towards Goal  Note: Fall Risk Interventions:  Mobility Interventions: Bed/chair exit alarm, Communicate number of staff needed for ambulation/transfer    Mentation Interventions: Adequate sleep, hydration, pain control, Bed/chair exit alarm, Door open when patient unattended, Evaluate medications/consider consulting pharmacy    Medication Interventions: Bed/chair exit alarm, Evaluate medications/consider consulting pharmacy, Patient to call before getting OOB, Teach patient to arise slowly    Elimination Interventions: Patient to call for help with toileting needs, Call light in reach, Bed/chair exit alarm, Toileting schedule/hourly rounds    History of Falls Interventions: Bed/chair exit alarm, Door open when patient unattended, Evaluate medications/consider consulting pharmacy, Room close to nurse's station         Problem: Patient Education: Go to Patient Education Activity  Goal: Patient/Family Education  Outcome: Progressing Towards Goal     Problem: Non-Violent Restraints  Goal: Removal from restraints as soon as assessed to be safe  Outcome: Progressing Towards Goal  Goal: No harm/injury to patient while restraints in use  Outcome: Progressing Towards Goal  Goal: Patient's dignity will be maintained  Outcome: Progressing Towards Goal  Goal: Patient Interventions  Outcome: Progressing Towards Goal     Problem: Patient Education: Go to Patient Education Activity  Goal: Patient/Family Education  Outcome: Progressing Towards Goal     Problem: Pressure Injury - Risk of  Goal: *Prevention of pressure injury  Description: Document Papo Scale and appropriate interventions in the flowsheet.   Outcome: Progressing Towards Goal  Note: Pressure Injury Interventions:  Sensory Interventions: Assess changes in LOC    Moisture Interventions: Apply protective barrier, creams and emollients, Absorbent underpads, Internal/External urinary devices, Limit adult briefs, Maintain skin hydration (lotion/cream)    Activity Interventions: Increase time out of bed, Pressure redistribution bed/mattress(bed type)    Mobility Interventions: HOB 30 degrees or less, Pressure redistribution bed/mattress (bed type), Float heels    Nutrition Interventions: Document food/fluid/supplement intake    Friction and Shear Interventions: Lift sheet, Minimize layers, Apply protective barrier, creams and emollients                Problem: Patient Education: Go to Patient Education Activity  Goal: Patient/Family Education  Outcome: Progressing Towards Goal     Problem: Patient Education: Go to Patient Education Activity  Goal: Patient/Family Education  Outcome: Progressing Towards Goal     Problem: Breathing Pattern - Ineffective  Goal: *Absence of hypoxia  Outcome: Progressing Towards Goal

## 2021-11-15 NOTE — PROGRESS NOTES
Bedside and Verbal shift change report given to Mando R[address] (oncoming nurse) by Zoe Qureshi RN (offgoing nurse). Report included the following information SBAR, Kardex, Intake/Output and MAR.

## 2021-11-15 NOTE — PROGRESS NOTES
[address] [facility] Adult  Hospitalist Group                                                                                          Hospitalist Progress Note  Alyce Brunner, MD  Answering service: [phone] from in house phone        Date of Service:  11/15/2021  NAME:  Harjinder Doherty  :  1950  MRN:  [id number]      Admission Summary:   Harjinder Doherty is a 70 y.o. male past medical history of asthma, history of PE complicated by hematemesis for which Eliquis was held, EtOH abuse, chronic essential tremor presents with primary complaint of shortness of breath. [facility]. States he presented to take his albuterol inhaler multiple times without relief. Denies fever chills, cough, chest pain. States he is a chronic drinker of vodka. States he drinks anywhere from two 2 ounce glasses a day to 4-6 on weekends although seemed rather reluctant to tell me the true amount he takes. States he \"thinks\" his last drink may have been yesterday. Denies use of recreational drugs. States he still currently feels short of breath. Vaccinated for Covid.      When I evaluated patient, he was tachycardic heart rate 115's, blood pressure stable, saturating well on RA. Received 1 mg of Ativan . ethanol elevated to 168. Labs notable for hypokalemia with a K of 2.7. Mag 1.2. CXR showed no acute process. Interval history / Subjective:    Patient seen and examined at bedside.      Remains alert and oriented X 2-3 today, denied pain,SOB  Off restraints now     Assessment & Plan:     Acute alcohol withdrawal-improving  Decrease to 25 mg librium BID  -continue CIWA protocol and prn ativan  -ativan requirements decreasing     Asthma exacerbation-resolved  -now stable on room air  Pulm signed off, decrease to 20 mg prednisone-last dose today  -can be d.c'd c and f/u with pulmonary patient [facility] pulm medicine from pul standpt  -flu and covid neg  -Continue duo nebs, Pulmicort, can resume home doses on d/c       Hypokalemia, hypomagnesemia-imrpoved  Repleted as needed       History of PE complicated by developing hematemesis for which Eliquis was held PTA  -Currently stable on room air saturating well     EtOH abuse  was counseled on abstaining       Diet: Regular   DVT PPx: Lovenox subcu  Code: Full        Care Plan discussed with: Patient/Family and Nurse  Anticipated Disposition: rehab  Anticipated Discharge:>48 hrs         Hospital Problems  Date Reviewed: 10/25/2021          Codes Class Noted POA    Alcohol withdrawal ([facility] ICD-10-CM: P37.459  ICD-9-CM: 291.81  11/9/2021 Unknown        COPD (chronic obstructive pulmonary disease) ([facility] ICD-10-CM: J44.9  ICD-9-CM: 277  11/9/2021 Unknown        COPD exacerbation ([facility] ICD-10-CM: J44.1  ICD-9-CM: 491.21  11/9/2021 Unknown                Review of Systems:   As per HPI      Vital Signs:    Last 24hrs VS reviewed since prior progress note. Most recent are:  Visit Vitals  /80 (BP 1 Location: Left upper arm, BP Patient Position: At rest)   Pulse 88   Temp 97.7 °F (36.5 °C)   Resp 18   Ht 5' 7\" (1.702 m)   Wt 72.8 kg (160 lb 7.9 oz)   SpO2 96%   BMI 25.14 kg/m²       No intake or output data in the 24 hours ending 11/15/21 5052     Physical Examination:     I had a face to face encounter with this patient and independently examined them on 11/15/2021 as outlined below:          Constitutional:  no distress   ENT:  Normal conjunctiva,anciteric sclera   Resp:  CTA bilaterally. No wheezing/rhonchi/rales.  No accessory muscle use   CV:  regular rhythym, normal rate,S1, S2 wnl    GI:  Soft, non distended, non tender,bowel sounds +    Musculoskeletal:  No LE edema    Neurologic: Alert and oriented X 2-3, moves all extremities            Data Review:    Review and/or order of clinical lab test    Review and/or order of tests in the medicine section of CPT      Labs:     Recent Labs     11/15/21  0110   WBC 3.5*   HGB 8.9*   HCT 27.6*   * Recent Labs     11/15/21  0110      K 3.8   *   CO2 27   BUN 13   CREA 0.65*   *   CA 8.3*   PHOS 3.0     Recent Labs     11/15/21  0110   ALB 2.2*     No results for input(s): INR, PTP, APTT, INREXT, INREXT in the last 72 hours. No results for input(s): FE, TIBC, PSAT, FERR in the last 72 hours. No results found for: FOL, RBCF   No results for input(s): PH, PCO2, PO2 in the last 72 hours. No results for input(s): CPK, CKNDX, TROIQ in the last 72 hours.     No lab exists for component: CPKMB  No results found for: CHOL, CHOLX, CHLST, CHOLV, HDL, HDLP, LDL, LDLC, DLDLP, TGLX, TRIGL, TRIGP, CHHD, CHHDX  Lab Results   Component Value Date/Time    Glucose (POC) 185 (H) 10/26/2021 09:00 AM    Glucose (POC) 152 (H) 10/25/2021 06:42 AM     No results found for: COLOR, APPRN, SPGRU, REFSG, RUSLAN, PROTU, GLUCU, KETU, BILU, UROU, ESTRELLA, LEUKU, GLUKE, EPSU, BACTU, WBCU, RBCU, CASTS, UCRY      Medications Reviewed:     Current Facility-Administered Medications   Medication Dose Route Frequency    chlordiazePOXIDE (LIBRIUM) capsule 25 mg  25 mg Oral Q12H    predniSONE (DELTASONE) tablet 20 mg  20 mg Oral DAILY WITH BREAKFAST    albuterol-ipratropium (DUO-NEB) 2.5 MG-0.5 MG/3 ML  3 mL Nebulization Q4H PRN    albuterol (PROVENTIL VENTOLIN) nebulizer solution 2.5 mg  2.5 mg Inhalation Q4H PRN    sodium chloride (NS) flush 5-40 mL  5-40 mL IntraVENous Q8H    sodium chloride (NS) flush 5-40 mL  5-40 mL IntraVENous PRN    acetaminophen (TYLENOL) tablet 650 mg  650 mg Oral Q6H PRN    Or    acetaminophen (TYLENOL) suppository 650 mg  650 mg Rectal Q6H PRN    polyethylene glycol (MIRALAX) packet 17 g  17 g Oral DAILY PRN    ondansetron (ZOFRAN ODT) tablet 4 mg  4 mg Oral Q8H PRN    Or    ondansetron (ZOFRAN) injection 4 mg  4 mg IntraVENous Q6H PRN    enoxaparin (LOVENOX) injection 40 mg  40 mg SubCUTAneous DAILY    budesonide (PULMICORT) 500 mcg/2 ml nebulizer suspension  500 mcg Nebulization BID RT  arformoteroL (BROVANA) neb solution 15 mcg  2 mL Nebulization BID RT    multivitamin, tx-iron-ca-min (THERA-M w/ IRON) tablet 1 Tablet  1 Tablet Oral DAILY    LORazepam (ATIVAN) injection 2 mg  2 mg IntraVENous Q1H PRN    thiamine HCL (B-1) tablet 100 mg  100 mg Oral DAILY    folic acid (FOLVITE) tablet 1 mg  1 mg Oral DAILY    0.9% sodium chloride infusion  75 mL/hr IntraVENous CONTINUOUS    calcium carbonate (TUMS) chewable tablet 200 mg [elemental]  200 mg Oral PRN     ______________________________________________________________________  EXPECTED LENGTH OF STAY: 3d 9h  ACTUAL LENGTH OF STAY:          5                 Lane Poon MD

## 2021-11-16 ENCOUNTER — APPOINTMENT (OUTPATIENT)
Dept: GENERAL RADIOLOGY | Age: 71
DRG: 897 | End: 2021-11-16
Attending: HOSPITALIST
Payer: MEDICARE

## 2021-11-16 LAB
ALBUMIN SERPL-MCNC: 2.7 G/DL (ref 3.5–5)
AMMONIA PLAS-SCNC: 16 UMOL/L
ANION GAP SERPL CALC-SCNC: 4 MMOL/L (ref 5–15)
ARTERIAL PATENCY WRIST A: POSITIVE
BASE EXCESS BLD CALC-SCNC: 4.1 MMOL/L
BASOPHILS # BLD: 0 K/UL (ref 0–0.1)
BASOPHILS NFR BLD: 0 % (ref 0–1)
BDY SITE: ABNORMAL
BUN SERPL-MCNC: 10 MG/DL (ref 6–20)
BUN/CREAT SERPL: 13 (ref 12–20)
CALCIUM SERPL-MCNC: 8.5 MG/DL (ref 8.5–10.1)
CHLORIDE SERPL-SCNC: 112 MMOL/L (ref 97–108)
CO2 SERPL-SCNC: 26 MMOL/L (ref 21–32)
CREAT SERPL-MCNC: 0.77 MG/DL (ref 0.7–1.3)
DIFFERENTIAL METHOD BLD: ABNORMAL
EOSINOPHIL # BLD: 0.1 K/UL (ref 0–0.4)
EOSINOPHIL NFR BLD: 1 % (ref 0–7)
ERYTHROCYTE [DISTWIDTH] IN BLOOD BY AUTOMATED COUNT: 15.2 % (ref 11.5–14.5)
GAS FLOW.O2 O2 DELIVERY SYS: ABNORMAL L/MIN
GLUCOSE SERPL-MCNC: 98 MG/DL (ref 65–100)
HCO3 BLD-SCNC: 27.3 MMOL/L (ref 22–26)
HCT VFR BLD AUTO: 33.3 % (ref 36.6–50.3)
HGB BLD-MCNC: 10.4 G/DL (ref 12.1–17)
IMM GRANULOCYTES # BLD AUTO: 0 K/UL (ref 0–0.04)
IMM GRANULOCYTES NFR BLD AUTO: 1 % (ref 0–0.5)
LYMPHOCYTES # BLD: 1.9 K/UL (ref 0.8–3.5)
LYMPHOCYTES NFR BLD: 36 % (ref 12–49)
MCH RBC QN AUTO: 30.6 PG (ref 26–34)
MCHC RBC AUTO-ENTMCNC: 31.2 G/DL (ref 30–36.5)
MCV RBC AUTO: 97.9 FL (ref 80–99)
MONOCYTES # BLD: 0.5 K/UL (ref 0–1)
MONOCYTES NFR BLD: 10 % (ref 5–13)
NEUTS SEG # BLD: 2.8 K/UL (ref 1.8–8)
NEUTS SEG NFR BLD: 52 % (ref 32–75)
NRBC # BLD: 0 K/UL (ref 0–0.01)
NRBC BLD-RTO: 0 PER 100 WBC
PCO2 BLD: 34.5 MMHG (ref 35–45)
PH BLD: 7.51 [PH] (ref 7.35–7.45)
PHOSPHATE SERPL-MCNC: 3.6 MG/DL (ref 2.6–4.7)
PLATELET # BLD AUTO: 138 K/UL (ref 150–400)
PMV BLD AUTO: 9.8 FL (ref 8.9–12.9)
PO2 BLD: 113 MMHG (ref 80–100)
POTASSIUM SERPL-SCNC: 3.6 MMOL/L (ref 3.5–5.1)
RBC # BLD AUTO: 3.4 M/UL (ref 4.1–5.7)
SAO2 % BLD: 98.9 % (ref 92–97)
SODIUM SERPL-SCNC: 142 MMOL/L (ref 136–145)
SPECIMEN TYPE: ABNORMAL
WBC # BLD AUTO: 5.4 K/UL (ref 4.1–11.1)

## 2021-11-16 PROCEDURE — 36415 COLL VENOUS BLD VENIPUNCTURE: CPT

## 2021-11-16 PROCEDURE — 74011000250 HC RX REV CODE- 250: Performed by: STUDENT IN AN ORGANIZED HEALTH CARE EDUCATION/TRAINING PROGRAM

## 2021-11-16 PROCEDURE — 94640 AIRWAY INHALATION TREATMENT: CPT

## 2021-11-16 PROCEDURE — 65660000000 HC RM CCU STEPDOWN

## 2021-11-16 PROCEDURE — 36600 WITHDRAWAL OF ARTERIAL BLOOD: CPT

## 2021-11-16 PROCEDURE — 74011250637 HC RX REV CODE- 250/637: Performed by: HOSPITALIST

## 2021-11-16 PROCEDURE — 85025 COMPLETE CBC W/AUTO DIFF WBC: CPT

## 2021-11-16 PROCEDURE — 92610 EVALUATE SWALLOWING FUNCTION: CPT

## 2021-11-16 PROCEDURE — 80069 RENAL FUNCTION PANEL: CPT

## 2021-11-16 PROCEDURE — 74011250636 HC RX REV CODE- 250/636: Performed by: STUDENT IN AN ORGANIZED HEALTH CARE EDUCATION/TRAINING PROGRAM

## 2021-11-16 PROCEDURE — 82803 BLOOD GASES ANY COMBINATION: CPT

## 2021-11-16 PROCEDURE — 97535 SELF CARE MNGMENT TRAINING: CPT

## 2021-11-16 PROCEDURE — 97530 THERAPEUTIC ACTIVITIES: CPT

## 2021-11-16 PROCEDURE — 82140 ASSAY OF AMMONIA: CPT

## 2021-11-16 PROCEDURE — 71045 X-RAY EXAM CHEST 1 VIEW: CPT

## 2021-11-16 RX ORDER — SUCRALFATE 1 G/1
1 TABLET ORAL
Status: DISCONTINUED | OUTPATIENT
Start: 2021-11-16 | End: 2021-11-19 | Stop reason: HOSPADM

## 2021-11-16 RX ORDER — PANTOPRAZOLE SODIUM 40 MG/1
40 TABLET, DELAYED RELEASE ORAL
Status: DISCONTINUED | OUTPATIENT
Start: 2021-11-16 | End: 2021-11-16

## 2021-11-16 RX ORDER — PANTOPRAZOLE SODIUM 40 MG/1
40 TABLET, DELAYED RELEASE ORAL
Status: DISCONTINUED | OUTPATIENT
Start: 2021-11-16 | End: 2021-11-19 | Stop reason: HOSPADM

## 2021-11-16 RX ADMIN — ONDANSETRON HYDROCHLORIDE 4 MG: 2 SOLUTION INTRAMUSCULAR; INTRAVENOUS at 16:25

## 2021-11-16 RX ADMIN — Medication 10 ML: at 16:31

## 2021-11-16 RX ADMIN — CHLORDIAZEPOXIDE HYDROCHLORIDE 25 MG: 25 CAPSULE ORAL at 10:47

## 2021-11-16 RX ADMIN — BUDESONIDE 500 MCG: 0.5 INHALANT RESPIRATORY (INHALATION) at 07:29

## 2021-11-16 RX ADMIN — SUCRALFATE 1 G: 1 TABLET ORAL at 16:25

## 2021-11-16 RX ADMIN — SUCRALFATE 1 G: 1 TABLET ORAL at 12:49

## 2021-11-16 RX ADMIN — BUDESONIDE 500 MCG: 0.5 INHALANT RESPIRATORY (INHALATION) at 22:01

## 2021-11-16 RX ADMIN — PANTOPRAZOLE SODIUM 40 MG: 40 TABLET, DELAYED RELEASE ORAL at 18:10

## 2021-11-16 RX ADMIN — ARFORMOTEROL TARTRATE 15 MCG: 15 SOLUTION RESPIRATORY (INHALATION) at 07:29

## 2021-11-16 RX ADMIN — ENOXAPARIN SODIUM 40 MG: 100 INJECTION SUBCUTANEOUS at 08:31

## 2021-11-16 RX ADMIN — LORAZEPAM 2 MG: 2 INJECTION INTRAMUSCULAR; INTRAVENOUS at 02:03

## 2021-11-16 RX ADMIN — PANTOPRAZOLE SODIUM 40 MG: 40 TABLET, DELAYED RELEASE ORAL at 11:49

## 2021-11-16 RX ADMIN — ONDANSETRON HYDROCHLORIDE 4 MG: 2 SOLUTION INTRAMUSCULAR; INTRAVENOUS at 10:41

## 2021-11-16 RX ADMIN — Medication 10 ML: at 05:26

## 2021-11-16 RX ADMIN — LORAZEPAM 2 MG: 2 INJECTION INTRAMUSCULAR; INTRAVENOUS at 05:24

## 2021-11-16 RX ADMIN — LORAZEPAM 2 MG: 2 INJECTION INTRAMUSCULAR; INTRAVENOUS at 03:16

## 2021-11-16 RX ADMIN — ARFORMOTEROL TARTRATE 15 MCG: 15 SOLUTION RESPIRATORY (INHALATION) at 22:01

## 2021-11-16 NOTE — PROGRESS NOTES
TRANSITION OF CARE  RUR--14%  Disposition--TBD. Referred to [facility][note 3] 11/13/21with acceptance pending. Transport--BLS    Patient's primary family contact: wife quincy Neil Dears. CM reviewed case with treatment team during [address]. Patient has issues with swallowing and intermittent vomiting. AREN spoke with Claudia Floyd Liaison [phone], who said that [facility] continues to follow. Decision regarding acceptance is contingent on patient's working with therapies.

## 2021-11-16 NOTE — PROGRESS NOTES
Speech pathology brief note; full note to follow. Patient with [facility] oral/pharyngeal swallow, and recommend regular/thin liquid diet. Consider GI consult given coughing and vomiting after meals.

## 2021-11-16 NOTE — PROGRESS NOTES
Problem: Mobility Impaired (Adult and Pediatric)  Goal: *Acute Goals and Plan of Care (Insert Text)  Description: FUNCTIONAL STATUS PRIOR TO ADMISSION: Patient required minimal assistance for basic and instrumental ADLs with pt reporting wife assists him. However, appears patient was still driving    [address]: The patient lived with wife but did not require assist.    Physical Therapy Goals  Initiated 11/10/2021  1. Patient will move from supine to sit and sit to supine , scoot up and down, and roll side to side in bed with supervision/set-up within 7 day(s). 2.  Patient will transfer from bed to chair and chair to bed with supervision/set-up using the least restrictive device within 7 day(s). 3.  Patient will perform sit to stand with supervision/set-up within 7 day(s). 4.  Patient will ambulate with supervision/set-up for 150 feet with the least restrictive device within 7 day(s). 5.  Patient will ascend/descend 4 stairs with 1 handrail(s) with minimal assistance/contact guard assist within 7 day(s). Outcome: Progressing Towards Goal   PHYSICAL THERAPY TREATMENT  Patient: Aleshia Foley (21 y.o. male)  Date: 11/16/2021  Diagnosis: Alcohol withdrawal ([facility] [F10.239]  COPD (chronic obstructive pulmonary disease) ([facility] [J44.9]  COPD exacerbation ([facility] [J44.1]   <principal problem not specified>       Precautions: Fall  Chart, physical therapy assessment, plan of care and goals were reviewed. ASSESSMENT  Patient continues with skilled PT services and is slowly progressing towards goals. Barriers to indep with functional mobility include impaired cognition, decreased balance/ coordination, general weakness, decreased activity tolerance, increased risk for fall. Pt alert and cooperative throughout session. Tolerated transfer to/ from [facility] with HHA x 2 for lift/ balance. Noted pt with difficulty sequencing pivot, decreased foot clearance bilat, wide RUMA.  May benefit from use of RW for OOB mobility next session. Pt remains below functional baseline. Will benefit from con't PT for balance /mobility progression as tolerated. Recommend follow up rehab at d/c to facilitate pt return to max level of functional independence. Current Level of Function Impacting Discharge (mobility/balance): bed mob mod A, SPT min/ mod A x 2    Other factors to consider for discharge: h/o ETOH, wife able to provide support         PLAN :  Patient continues to benefit from skilled intervention to address the above impairments. Continue treatment per established plan of care. to address goals. Recommendation for discharge: (in order for the patient to meet his/her long term goals)  Therapy 3 hours per day 5-7 days per week    This discharge recommendation:  Has been made in collaboration with the attending provider and/or case management    IF paatient discharges home will need the following DME: to be determined (TBD)       SUBJECTIVE:   Patient indicated need to use toilet    OBJECTIVE DATA SUMMARY:   Critical Behavior:  Neurologic State: Alert  Orientation Level: Oriented to person, Oriented to place  Cognition: Follows commands  Safety/Judgement: Awareness of environment, Decreased awareness of need for assistance, Decreased awareness of need for safety, Decreased insight into deficits, Fall prevention  Functional Mobility Training:  Bed Mobility:     Supine to Sit: Moderate assistance; Bed Modified; Additional time              Transfers:  Sit to Stand: Moderate assistance; Assist x2  Stand to Sit: Minimum assistance; Assist x2  Stand Pivot Transfers: Minimum assistance; Moderate assistance; Assist x2 (bed<->BSC w/ B HHA, wide RUMA, cues for sequence, small steps)                          Balance:  Sitting: Impaired  Sitting - Static: Good (unsupported)  Sitting - Dynamic: Fair (occasional)  Standing: Impaired;  With support  Standing - Static: Fair; Constant support  Standing - Dynamic : Poor; Constant support      Pain Rating:  No c/o pain during session    Activity Tolerance:   Fair    After treatment patient left in no apparent distress:   Call bell within reach, Bed / chair alarm activated, Side rails x 3 and sitting up in bed with breakfast tray set up    COMMUNICATION/COLLABORATION:   The patients plan of care was discussed with: Registered nurse.      Jacqui Rubin, PT   Time Calculation: 22 mins

## 2021-11-16 NOTE — PROGRESS NOTES
Comprehensive Nutrition Assessment    Type and Reason for Visit: Initial, RD nutrition re-screen/LOS    Nutrition Recommendations/Plan:    1. Consider GI consult if N/V continue despite carafate  2. Encouragement and prompting with all meals   - should be drinking Ensure if less than 75% meals consumed   - wife may bring foods from home as tolerated  3. Weekly weights on standing scale    Nutrition Assessment:    Pt admitted for ETOH withdrawal. PMHx: COPD, ETOH abuse. ETOH withdrawal on admit with asthma exacerbation. Confusion improving. Nausea and vomiting developing yesterday with coughing and vomiting after meals. Seen by SLP today but with normal swallow. ?consider GI consult. Per MD notes and discussion with wife pt does have hx of esophagitis and esophageal ulcers. Carafate started QID. Pt visited today but unable to provider any hx. Not overt muscle or fat wasting noted. Per wife last wt she remembers him reporting was 179# but vague on when that was. Wt down to 160# on 11/11 on standing scale. Fair/good appetite earlier this admit noted but down over past few days and now with the N/V. Ensure BID but will increase to TID (1050kcal, 60g protein) with suspected esophagitis may be easier to tolerate. Will also trial Magic Cup (290klca, 9g protein)    Malnutrition Assessment:  Malnutrition Status:  Insufficient data      Nutritionally Significant Medications: librium, folic acid, MVI + iron, thiamine; PRN: zofran, miralax, ativan    Estimated Daily Nutrient Needs:  Energy (kcal): 1730-1902kcal (MSJ x 1.2 x 1-1.1); Weight Used for Energy Requirements: Current (72.8kg)  Protein (g): 87-102g (1.2-1.4g/kg);  Weight Used for Protein Requirements: Current (72.8kg)  Fluid (ml/day): 1800ml; Method Used for Fluid Requirements: 1 ml/kcal    Nutrition Related Findings:       BM: 11/16  Edema: none documented  Wounds:  None       Current Nutrition Therapies:   Diet: regular  Supplements: Ensure Enlive BID  Meal intake:   Patient Vitals for the past 168 hrs:   % Diet Eaten   11/15/21 0836 51 - 75%   11/11/21 1531 26 - 50%   11/11/21 1109 76 - 100%   11/10/21 1334 76 - 100%   11/10/21 0925 76 - 100%   11/09/21 1800 76 - 100%     Anthropometric Measures:  · Height:  5' 7\" (170.2 cm)  · Current Body Wt:  72.8 kg (160 lb 7.9 oz)   · Admission Body Wt:       · Usual Body Wt:        · Ideal Body Wt:  148:  108.4 %   Wt Readings from Last 10 Encounters:   11/10/21 72.8 kg (160 lb 7.9 oz)   10/24/21 76 kg (167 lb 9.6 oz)     Nutrition Diagnosis:   · Inadequate oral intake related to altered GI function as evidenced by nausea, vomiting, GI abnormality, intake 51-75% (?esophagitis)    Nutrition Interventions:   Food and/or Nutrient Delivery: Continue current diet, Start oral nutrition supplement, Modify oral nutrition supplement  Nutrition Education and Counseling: No recommendations at this time  Coordination of Nutrition Care: Continue to monitor while inpatient    Goals:  Consumption of at least 75% meals and 2-3 supplements/day in 3-4 days       Nutrition Monitoring and Evaluation:   Behavioral-Environmental Outcomes: None identified  Food/Nutrient Intake Outcomes: Food and nutrient intake, Supplement intake  Physical Signs/Symptoms Outcomes: Weight, GI status, Nausea/vomiting    Discharge Planning:     Too soon to determine     Ananya Kilgore RD  [facility], Contact via [employer]

## 2021-11-16 NOTE — PROGRESS NOTES
SPEECH PATHOLOGY BEDSIDE SWALLOW EVALUATION/DISCHARGE  Patient: Hugo Sandoval (58 y.o. male)  Date: 11/16/2021  Primary Diagnosis: Alcohol withdrawal ([facility] [F10.239]  COPD (chronic obstructive pulmonary disease) ([facility] [J44.9]  COPD exacerbation ([facility] [J44.1]       Precautions:   Fall    ASSESSMENT :  Based on the objective data described below, the patient presents with no oral/pharyngeal dysphagia, and no overt s/s aspiration observed. SLP was consulted due to patient coughing and vomiting with meals, however this does not appear related to oral/pharyngeal swallow. Suspect esophageal, and note per chart review patient with history of GI issues. Skilled acute therapy provided by a speech-language pathologist is not indicated at this time. PLAN :  Recommendations:  -Regular/thin liquid diet  -Consider GI consult given emesis  Discharge Recommendations: None     SUBJECTIVE:   Patient drowsy, no verbalizations. OBJECTIVE:     Past Medical History:   Diagnosis Date    Chronic obstructive pulmonary disease ([facility]    History reviewed. No pertinent surgical history.   Prior Level of Function/Home Situation:   Home Situation  Home Environment: Private residence  One/Two Story Residence: One story (with basement)  Living Alone: No  Support Systems: Spouse/Significant Other  Current DME Used/Available at Home: Cane, straight, Grab bars, Shower chair, Walker, rollator, Raised toilet seat  Tub or Shower Type: Shower (built in seat)  Diet prior to admission: regular/thin  Current Diet:  Regular/thin   Cognitive and Communication Status:  Neurologic State: Drowsy  Orientation Level: Unable to verbalize (due to drowsiness)  Cognition: Decreased attention/concentration, Decreased command following  Perception: Appears intact  Perseveration: No perseveration noted  Safety/Judgement: Decreased awareness of need for assistance, Decreased awareness of need for safety, Decreased insight into deficits  Oral Assessment:  Oral Assessment  Labial: Other (comment) (unable to assess due to drowsiness)  P.O. Trials:  Patient Position: upright in bed  Vocal quality prior to P.O.: Other (comment) (no verbalizations)  Consistency Presented: Thin liquid; Puree; Solid  How Presented: Self-fed/presented; SLP-fed/presented; Straw; Successive swallows; Spoon     Bolus Acceptance: No impairment  Bolus Formation/Control: No impairment     Propulsion: No impairment  Oral Residue: None  Initiation of Swallow: No impairment  Laryngeal Elevation: Functional  Aspiration Signs/Symptoms: None  Pharyngeal Phase Characteristics: No impairment, issues, or problems              Oral Phase Severity: No impairment  Pharyngeal Phase Severity : No impairment  NOMS:   The NOMS functional outcome measure was used to quantify this patient's level of swallowing impairment. Based on the NOMS, the patient was determined to be at level 7 for swallow function     NOMS Swallowing Levels:  Level 1 (CN): NPO  Level 2 (CM): NPO but takes consistency in therapy  Level 3 (CL): Takes less than 50% of nutrition p.o. and continues with nonoral feedings; and/or safe with mod cues; and/or max diet restriction  Level 4 (CK): Safe swallow but needs mod cues; and/or mod diet restriction; and/or still requires some nonoral feeding/supplements  Level 5 (CJ): Safe swallow with min diet restriction; and/or needs min cues  Level 6 (CI): Independent with p.o.; rare cues; usually self cues; may need to avoid some foods or needs extra time  Level 7 ([address] [place] [address]): Independent for all p.o.  VI. (2003). National Outcomes Measurement System (NOMS): Adult Speech-Language Pathology User's Guide. Pain:  Pain Scale 1: Numeric (0 - 10)  Pain Intensity 1: 0     After treatment:   Patient left in no apparent distress in bed, Call bell within reach and Nursing notified    COMMUNICATION/EDUCATION:   Patient was educated regarding his deficit(s) of WFL swallow as this relates to his diagnosis.   He demonstrated Fair understanding as evidenced by nodding. The patient's plan of care including recommendations, planned interventions, and recommended diet changes were discussed with: Registered nurse.      Thank you for this referral.  PAULINO Jimenez  Time Calculation: 15 mins

## 2021-11-16 NOTE — PROGRESS NOTES
Bedside and Verbal shift change report given to M[address] (oncoming nurse) by Tianna Chacon RN (offgoing nurse). Report included the following information SBAR, Kardex, Intake/Output and MAR.

## 2021-11-16 NOTE — PROGRESS NOTES
[facility] Adult  Hospitalist Group                                                                                          Hospitalist Progress Note  Teresa Heller MD  Answering service: [phone] OR 36 from in house phone        Date of Service:  2021  NAME:  Norah Rai  :  1950  MRN:  [id number]      Admission Summary:   Norah Rai is a 70 y.o. male past medical history of asthma, history of PE complicated by hematemesis for which Eliquis was held, EtOH abuse, chronic essential tremor presents with primary complaint of shortness of breath. [facility]. States he presented to take his albuterol inhaler multiple times without relief. Denies fever chills, cough, chest pain. States he is a chronic drinker of vodka. States he drinks anywhere from two 2 ounce glasses a day to 4-6 on weekends although seemed rather reluctant to tell me the true amount he takes. States he \"thinks\" his last drink may have been yesterday. Denies use of recreational drugs. States he still currently feels short of breath. Vaccinated for Covid.      When I evaluated patient, he was tachycardic heart rate 115's, blood pressure stable, saturating well on RA. Received 1 mg of Ativan . ethanol elevated to 168. Labs notable for hypokalemia with a K of 2.7. Mag 1.2. CXR showed no acute process. Interval history / Subjective:    Patient seen and examined at bedside.      He had some cough and vomiting while eating breakfast today  Received ativan last night due to agitation     Assessment & Plan:     Acute alcohol withdrawal-improving  on  25 mg librium BID  -continue CIWA protocol and prn ativan  -he received ativan last night as he was agitated/trying to get out of bed, CIWA score was high-will add seroquel tonight  CIWA scores 4-5 since this morning     Asthma exacerbation-resolved  -now stable on room air  Pulm signed off, decrease to 20 mg prednisone-last dose today  -can be d.c'd c and f/u with pulmonary patient [facility] pulm medicine from pulm standpt  -flu and covid neg  -Continue duo nebs, Pulmicort, can resume home doses on d/c    # Severe esophagitis  #History of esophageal ulcers  On PPI BID,carafate 4 times daily       Hypokalemia, hypomagnesemia-improved  Repleted as needed       History of PE complicated by developing hematemesis for which Eliquis was held PTA       Chronic alcoholism         Diet: Regular   DVT PPx: Lovenox subcu  Code: Full        Care Plan discussed with: Patient/Family and Nurse  Anticipated Disposition: rehab  Anticipated Discharge:>48 hrs         Hospital Problems  Date Reviewed: 10/25/2021          Codes Class Noted POA    Alcohol withdrawal ([facility] ICD-10-CM: M10.707  ICD-9-CM: 291.81  11/9/2021 Unknown        COPD (chronic obstructive pulmonary disease) ([facility] ICD-10-CM: J44.9  ICD-9-CM: 496  11/9/2021 Unknown        COPD exacerbation ([facility] ICD-10-CM: J44.1  ICD-9-CM: 491.21  11/9/2021 Unknown                Review of Systems:   As per HPI      Vital Signs:    Last 24hrs VS reviewed since prior progress note. Most recent are:  Visit Vitals  /79 (BP 1 Location: Left upper arm, BP Patient Position: At rest)   Pulse 80   Temp 98.5 °F (36.9 °C)   Resp 24   Ht 5' 7\" (1.702 m)   Wt 72.8 kg (160 lb 7.9 oz)   SpO2 96%   BMI 25.14 kg/m²         Intake/Output Summary (Last 24 hours) at 11/16/2021 1335  Last data filed at 11/16/2021 0804  Gross per 24 hour   Intake    Output 350 ml   Net -350 ml        Physical Examination:     I had a face to face encounter with this patient and independently examined them on 11/16/2021 as outlined below:          Constitutional:  no distress   ENT:  Normal conjunctiva,anciteric sclera,EOMI,anicteric sclera   Resp:  CTA bilaterally. No wheezing/rhonchi/rales.  No accessory muscle use   CV:  regular rhythym, normal rate,S1, S2 wnl    GI:  Soft, non distended, non tender,bowel sounds +    Musculoskeletal:  No LE edema    Neurologic: Sleepy/lethargic            Data Review:    Review and/or order of clinical lab test  Review and/or order of tests in the medicine section of [facility]      Labs:     Recent Labs     11/16/21  0225 11/15/21  0110   WBC 5.4 3.5*   HGB 10.4* 8.9*   HCT 33.3* 27.6*   * 114*     Recent Labs     11/16/21  0225 11/15/21  0110    142   K 3.6 3.8   * 113*   CO2 26 27   BUN 10 13   CREA 0.77 0.65*   GLU 98 102*   CA 8.5 8.3*   PHOS 3.6 3.0     Recent Labs     11/16/21  0225 11/15/21  0110   ALB 2.7* 2.2*     No results for input(s): INR, PTP, APTT, INREXT, INREXT in the last 72 hours. No results for input(s): FE, TIBC, PSAT, FERR in the last 72 hours. No results found for: FOL, RBCF   No results for input(s): PH, PCO2, PO2 in the last 72 hours. No results for input(s): CPK, CKNDX, TROIQ in the last 72 hours.     No lab exists for component: CPKMB  No results found for: CHOL, CHOLX, CHLST, CHOLV, HDL, HDLP, LDL, LDLC, DLDLP, TGLX, TRIGL, TRIGP, CHHD, CHHDX  Lab Results   Component Value Date/Time    Glucose (POC) 185 (H) 10/26/2021 09:00 AM    Glucose (POC) 152 (H) 10/25/2021 06:42 AM     No results found for: COLOR, APPRN, SPGRU, REFSG, RUSLAN, PROTU, GLUCU, KETU, BILU, UROU, ESTRELLA, LEUKU, GLUKE, EPSU, BACTU, WBCU, RBCU, CASTS, UCRY      Medications Reviewed:     Current Facility-Administered Medications   Medication Dose Route Frequency    sucralfate (CARAFATE) tablet 1 g  1 g Oral AC&HS    pantoprazole (PROTONIX) tablet 40 mg  40 mg Oral ACB&D    chlordiazePOXIDE (LIBRIUM) capsule 25 mg  25 mg Oral Q12H    albuterol-ipratropium (DUO-NEB) 2.5 MG-0.5 MG/3 ML  3 mL Nebulization Q4H PRN    albuterol (PROVENTIL VENTOLIN) nebulizer solution 2.5 mg  2.5 mg Inhalation Q4H PRN    sodium chloride (NS) flush 5-40 mL  5-40 mL IntraVENous Q8H    sodium chloride (NS) flush 5-40 mL  5-40 mL IntraVENous PRN    acetaminophen (TYLENOL) tablet 650 mg  650 mg Oral Q6H PRN    Or    acetaminophen (TYLENOL) suppository 650 mg  650 mg Rectal Q6H PRN    polyethylene glycol (MIRALAX) packet 17 g  17 g Oral DAILY PRN    ondansetron (ZOFRAN ODT) tablet 4 mg  4 mg Oral Q8H PRN    Or    ondansetron (ZOFRAN) injection 4 mg  4 mg IntraVENous Q6H PRN    [Held by provider] enoxaparin (LOVENOX) injection 40 mg  40 mg SubCUTAneous DAILY    budesonide (PULMICORT) 500 mcg/2 ml nebulizer suspension  500 mcg Nebulization BID RT    arformoteroL (BROVANA) neb solution 15 mcg  2 mL Nebulization BID RT    multivitamin, tx-iron-ca-min (THERA-M w/ IRON) tablet 1 Tablet  1 Tablet Oral DAILY    LORazepam (ATIVAN) injection 2 mg  2 mg IntraVENous Q1H PRN    thiamine HCL (B-1) tablet 100 mg  100 mg Oral DAILY    folic acid (FOLVITE) tablet 1 mg  1 mg Oral DAILY    calcium carbonate (TUMS) chewable tablet 200 mg [elemental]  200 mg Oral PRN     ______________________________________________________________________  EXPECTED LENGTH OF STAY: 3d 9h  ACTUAL LENGTH OF STAY:          6                 Elizabeth Mckeon MD

## 2021-11-16 NOTE — PROGRESS NOTES
Problem: Self Care Deficits Care Plan (Adult)  Goal: *Acute Goals and Plan of Care (Insert Text)  Description:   FUNCTIONAL STATUS PRIOR TO ADMISSION: Patient was independent and active without use of DME.     HOME SUPPORT: The patient lived with wife but did not require assist.    Occupational Therapy Goals  Initiated 11/12/2021  1. Patient will perform grooming in standing for 5 minutes with CGA within 7 day(s). 2.  Patient will perform bathing using most appropriate DME with minimal assistance/contact guard assist within 7 day(s). 3.  Patient will perform lower body dressing with minimal assistance/contact guard assist within 7 day(s). 4.  Patient will perform toilet transfers with minimal assistance/contact guard assist within 7 day(s). 5.  Patient will perform all aspects of toileting with minimal assistance/contact guard assist within 7 day(s). 6.  Patient will participate in upper extremity therapeutic exercise/activities with supervision/set-up for 5 minutes within 7 day(s). 7.  Patient will utilize energy conservation techniques during functional activities with verbal cues within 7 day(s). Outcome: Progressing Towards Goal   OCCUPATIONAL THERAPY TREATMENT  Patient: Angela Job (30 y.o. male)  Date: 11/16/2021  Diagnosis: Alcohol withdrawal ([facility] [F10.239]  COPD (chronic obstructive pulmonary disease) ([facility] [J44.9]  [facility] [J44.1]   <principal problem not specified>       Precautions: Fall  Chart, occupational therapy assessment, plan of care, and goals were reviewed. ASSESSMENT  Patient continues with skilled OT services and is progressing towards goals. Patient received reclined in bed, calling for help. Patient requesting to use bedside commode, educated on use of call light to get help for toileting in future. Completed bed mobility with Min A, Mod Ax2 to stand, Minx2 small steps towards bedside commode.  Required extended time attempting to have a BM, however unsuccessful. Returned to bedlevel and set up with all needs in reach and with breakfast tray. Reviewed use of call light with good recall of which button, RN aware of left status. Current Level of Function Impacting Discharge (ADLs): cues for safety    Other factors to consider for discharge: below baseline, fall risk         PLAN :  Patient continues to benefit from skilled intervention to address the above impairments. Continue treatment per established plan of care to address goals. Recommend with staff: OOB 3x daily for meals, functional mobility to [facility]    Recommend next OT session: standing tolerance. Recommendation for discharge: (in order for the patient to meet his/her long term goals)  Therapy 3 hours per day 5-7 days per week    This discharge recommendation:  Has not yet been discussed the attending provider and/or case management    IF patient discharges home will need the following DME: TBD pending progress       SUBJECTIVE:   Patient stated I need to use that.  in regards to [facility]    OBJECTIVE DATA SUMMARY:   Cognitive/Behavioral Status:  Neurologic State: Drowsy  Orientation Level: Unable to verbalize (due to drowsiness)  Cognition: Decreased attention/concentration; Decreased command following  Perception: Appears intact  Perseveration: No perseveration noted  Safety/Judgement: Decreased awareness of need for assistance; Decreased awareness of need for safety; Decreased insight into deficits    Functional Mobility and Transfers for ADLs:  Bed Mobility:  Rolling: Minimum assistance  Supine to Sit: Minimum assistance (Simultaneous filing. User may not have seen previous data.)    Transfers:  Sit to Stand: Moderate assistance; Assist x2 (Simultaneous filing. User may not have seen previous data.)  Functional Transfers  Toilet Transfer : Minimum assistance; Assist x2       Balance:  Sitting: Impaired (Simultaneous filing.  User may not have seen previous data.)  Sitting - Static: [address] [facility] (occasional) (Simultaneous filing. User may not have seen previous data.)  Sitting - Dynamic: Fair (occasional) (Simultaneous filing. User may not have seen previous data.)  Standing: Impaired; With support (Simultaneous filing. User may not have seen previous data.)  Standing - Static: Constant support; Fair (Simultaneous filing. User may not have seen previous data.)  Standing - Dynamic : Constant support; Fair (Simultaneous filing. User may not have seen previous data.)    ADL Intervention:                                     Cognitive Retraining  Safety/Judgement: Decreased awareness of need for assistance; Decreased awareness of need for safety; Decreased insight into deficits    Pain:  None reported    Activity Tolerance:   Fair    After treatment patient left in no apparent distress:   Supine in bed and Call bell within reach    COMMUNICATION/COLLABORATION:   The patients plan of care was discussed with: Physical therapist and Registered nurse.      Lashanda Terrazas OT  Time Calculation: 27 mins

## 2021-11-17 LAB
ALBUMIN SERPL-MCNC: 2.3 G/DL (ref 3.5–5)
ANION GAP SERPL CALC-SCNC: 4 MMOL/L (ref 5–15)
BASOPHILS # BLD: 0 K/UL (ref 0–0.1)
BASOPHILS NFR BLD: 1 % (ref 0–1)
BUN SERPL-MCNC: 8 MG/DL (ref 6–20)
BUN/CREAT SERPL: 13 (ref 12–20)
CALCIUM SERPL-MCNC: 8.6 MG/DL (ref 8.5–10.1)
CHLORIDE SERPL-SCNC: 108 MMOL/L (ref 97–108)
CO2 SERPL-SCNC: 28 MMOL/L (ref 21–32)
COMMENT, HOLDF: NORMAL
CREAT SERPL-MCNC: 0.62 MG/DL (ref 0.7–1.3)
DIFFERENTIAL METHOD BLD: ABNORMAL
EOSINOPHIL # BLD: 0.1 K/UL (ref 0–0.4)
EOSINOPHIL NFR BLD: 3 % (ref 0–7)
ERYTHROCYTE [DISTWIDTH] IN BLOOD BY AUTOMATED COUNT: 14.7 % (ref 11.5–14.5)
GLUCOSE SERPL-MCNC: 88 MG/DL (ref 65–100)
HCT VFR BLD AUTO: 29.8 % (ref 36.6–50.3)
HGB BLD-MCNC: 9.3 G/DL (ref 12.1–17)
IMM GRANULOCYTES # BLD AUTO: 0 K/UL (ref 0–0.04)
IMM GRANULOCYTES NFR BLD AUTO: 0 % (ref 0–0.5)
LYMPHOCYTES # BLD: 1.4 K/UL (ref 0.8–3.5)
LYMPHOCYTES NFR BLD: 38 % (ref 12–49)
MCH RBC QN AUTO: 30.3 PG (ref 26–34)
MCHC RBC AUTO-ENTMCNC: 31.2 G/DL (ref 30–36.5)
MCV RBC AUTO: 97.1 FL (ref 80–99)
MONOCYTES # BLD: 0.3 K/UL (ref 0–1)
MONOCYTES NFR BLD: 8 % (ref 5–13)
NEUTS SEG # BLD: 1.8 K/UL (ref 1.8–8)
NEUTS SEG NFR BLD: 50 % (ref 32–75)
NRBC # BLD: 0 K/UL (ref 0–0.01)
NRBC BLD-RTO: 0 PER 100 WBC
PHOSPHATE SERPL-MCNC: 3.6 MG/DL (ref 2.6–4.7)
PLATELET # BLD AUTO: 148 K/UL (ref 150–400)
PMV BLD AUTO: 10.1 FL (ref 8.9–12.9)
POTASSIUM SERPL-SCNC: 3.4 MMOL/L (ref 3.5–5.1)
RBC # BLD AUTO: 3.07 M/UL (ref 4.1–5.7)
SAMPLES BEING HELD,HOLD: NORMAL
SODIUM SERPL-SCNC: 140 MMOL/L (ref 136–145)
WBC # BLD AUTO: 3.6 K/UL (ref 4.1–11.1)

## 2021-11-17 PROCEDURE — 74011250636 HC RX REV CODE- 250/636: Performed by: STUDENT IN AN ORGANIZED HEALTH CARE EDUCATION/TRAINING PROGRAM

## 2021-11-17 PROCEDURE — 97535 SELF CARE MNGMENT TRAINING: CPT

## 2021-11-17 PROCEDURE — 36415 COLL VENOUS BLD VENIPUNCTURE: CPT

## 2021-11-17 PROCEDURE — 65660000000 HC RM CCU STEPDOWN

## 2021-11-17 PROCEDURE — 74011250637 HC RX REV CODE- 250/637: Performed by: STUDENT IN AN ORGANIZED HEALTH CARE EDUCATION/TRAINING PROGRAM

## 2021-11-17 PROCEDURE — 74011000250 HC RX REV CODE- 250: Performed by: STUDENT IN AN ORGANIZED HEALTH CARE EDUCATION/TRAINING PROGRAM

## 2021-11-17 PROCEDURE — 94640 AIRWAY INHALATION TREATMENT: CPT

## 2021-11-17 PROCEDURE — 97530 THERAPEUTIC ACTIVITIES: CPT

## 2021-11-17 PROCEDURE — 74011250637 HC RX REV CODE- 250/637: Performed by: HOSPITALIST

## 2021-11-17 PROCEDURE — 85025 COMPLETE CBC W/AUTO DIFF WBC: CPT

## 2021-11-17 PROCEDURE — 80069 RENAL FUNCTION PANEL: CPT

## 2021-11-17 PROCEDURE — 97116 GAIT TRAINING THERAPY: CPT

## 2021-11-17 RX ADMIN — CHLORDIAZEPOXIDE HYDROCHLORIDE 25 MG: 25 CAPSULE ORAL at 21:35

## 2021-11-17 RX ADMIN — SUCRALFATE 1 G: 1 TABLET ORAL at 00:00

## 2021-11-17 RX ADMIN — ENOXAPARIN SODIUM 40 MG: 100 INJECTION SUBCUTANEOUS at 10:19

## 2021-11-17 RX ADMIN — Medication 10 ML: at 07:38

## 2021-11-17 RX ADMIN — Medication 10 ML: at 14:00

## 2021-11-17 RX ADMIN — CHLORDIAZEPOXIDE HYDROCHLORIDE 25 MG: 25 CAPSULE ORAL at 10:17

## 2021-11-17 RX ADMIN — Medication 10 ML: at 22:00

## 2021-11-17 RX ADMIN — CHLORDIAZEPOXIDE HYDROCHLORIDE 25 MG: 25 CAPSULE ORAL at 00:00

## 2021-11-17 RX ADMIN — SUCRALFATE 1 G: 1 TABLET ORAL at 21:35

## 2021-11-17 RX ADMIN — BUDESONIDE 500 MCG: 0.5 INHALANT RESPIRATORY (INHALATION) at 08:00

## 2021-11-17 RX ADMIN — ARFORMOTEROL TARTRATE 15 MCG: 15 SOLUTION RESPIRATORY (INHALATION) at 08:00

## 2021-11-17 RX ADMIN — Medication 10 ML: at 00:00

## 2021-11-17 RX ADMIN — ARFORMOTEROL TARTRATE 15 MCG: 15 SOLUTION RESPIRATORY (INHALATION) at 21:47

## 2021-11-17 RX ADMIN — FOLIC ACID 1 MG: 1 TABLET ORAL at 10:17

## 2021-11-17 RX ADMIN — MULTIPLE VITAMINS W/ MINERALS TAB 1 TABLET: TAB at 10:17

## 2021-11-17 RX ADMIN — BUDESONIDE 500 MCG: 0.5 INHALANT RESPIRATORY (INHALATION) at 21:46

## 2021-11-17 RX ADMIN — Medication 100 MG: at 10:17

## 2021-11-17 RX ADMIN — SUCRALFATE 1 G: 1 TABLET ORAL at 07:38

## 2021-11-17 RX ADMIN — PANTOPRAZOLE SODIUM 40 MG: 40 TABLET, DELAYED RELEASE ORAL at 07:38

## 2021-11-17 NOTE — PROGRESS NOTES
Bedside shift change report given to Smith   (oncoming nurse) by Teofilo Brumfield  (offgoing nurse). Report included the following information SBAR, Kardex, MAR and Recent Results.

## 2021-11-17 NOTE — PROGRESS NOTES
Bedside and Verbal shift change report given to Beba Mendoza RN (oncoming nurse) by Noelle Raza RN  (offgoing nurse). Report included the following information SBAR, Kardex, Procedure Summary, Intake/Output, Recent Results and Quality Measures.

## 2021-11-17 NOTE — PROGRESS NOTES
Problem: Self Care Deficits Care Plan (Adult)  Goal: *Acute Goals and Plan of Care (Insert Text)  Description:   FUNCTIONAL STATUS PRIOR TO ADMISSION: Patient was independent and active without use of DME.     HOME SUPPORT: The patient lived with wife but did not require assist.    Occupational Therapy Goals  Initiated 11/12/2021  1. Patient will perform grooming in standing for 5 minutes with CGA within 7 day(s). 2.  Patient will perform bathing using most appropriate DME with minimal assistance/contact guard assist within 7 day(s). 3.  Patient will perform lower body dressing with minimal assistance/contact guard assist within 7 day(s). 4.  Patient will perform toilet transfers with minimal assistance/contact guard assist within 7 day(s). 5.  Patient will perform all aspects of toileting with minimal assistance/contact guard assist within 7 day(s). 6.  Patient will participate in upper extremity therapeutic exercise/activities with supervision/set-up for 5 minutes within 7 day(s). 7.  Patient will utilize energy conservation techniques during functional activities with verbal cues within 7 day(s). Outcome: Progressing Towards Goal   OCCUPATIONAL THERAPY TREATMENT  Patient: Ashley Barnard (62 y.o. male)  Date: 11/17/2021  Diagnosis: Alcohol withdrawal ([facility] [F10.239]  COPD (chronic obstructive pulmonary disease) ([facility] [J44.9]  [facility] ([facility] [J44.1]   <principal problem not specified>       Precautions: Fall, Seizure, Skin  Chart, occupational therapy assessment, plan of care, and goals were reviewed. ASSESSMENT  Patient continues with skilled OT services and  progressing towards goals. Patient cooperative but limited by decreased dynamic sitting and standing balance, decreased endurance, and increased time needed for all tasks; patient with impaired processing skills, requiring increased time to answer basic questions, which are eventually correct overall.  Patient with poor functional endurance, tolerating out of bed 1 hour and 8 minutes with marked fatigue and sense that he was unable to sit up longer. Functional mobility skills did decrease after 1 hour up in chair, min/mod when fresh, mod/max when fatigued. Current Level of Function Impacting Discharge (ADLs): set up, min A UE ADLs, Min A-max A LE ADLs, Mod A/max A functional mobility with skills declined with out of bed fatigue    Other factors to consider for discharge: supportive wife present part of tx         PLAN :  Patient continues to benefit from skilled intervention to address the above impairments. Continue treatment per established plan of care to address goals. Recommend with staff: up to chair for ALL meals, min A transfer out of bed, mod/max A back to bed    Recommend next OT session: standing tolerance,. LE ADLs, MoCA    Recommendation for discharge: (in order for the patient to meet his/her long term goals)  Therapy 3 hours per day 5-7 days per week    This discharge recommendation:  Has been made in collaboration with the attending provider and/or case management    IF patient discharges home will need the following DME: AE: long handled bathing, AE: long handled dressing, bedside commode, transfer bench, and walker: rolling       SUBJECTIVE:   Patient stated I think I can sit up for 20 minutes.  (stayed up 72)    OBJECTIVE DATA SUMMARY:   Cognitive/Behavioral Status:  Neurologic State: Drowsy; Alert  Orientation Level: Oriented to person; Oriented to place; Oriented to situation; Oriented to time  Cognition: Impulsive; Impaired decision making; Decreased attention/concentration; Follows commands  Perception: Appears intact  Perseveration: No perseveration noted  Safety/Judgement: Awareness of environment; Decreased insight into deficits;  Decreased awareness of need for safety; Decreased awareness of need for assistance    Functional Mobility and Transfers for ADLs:  Bed Mobility:  Rolling: Minimum assistance; Moderate assistance  Supine to Sit: Moderate assistance  Sit to Supine: Moderate assistance; Assist x2; Additional time  Scooting: Minimum assistance    Transfers:  Sit to Stand: Minimum assistance; Moderate assistance (Mod A to maintain standing w/RW)  Functional Transfers  Toilet Transfer : Minimum assistance; Maximum assistance (strength declined after 1 hour up in chair)  Bed to Chair: Moderate assistance; Additional time    Balance:  Sitting: Impaired  Sitting - Static: Good (unsupported); Fair (occasional) (occasionally listing to L, seated edge of bed)  Sitting - Dynamic: Fair (occasional)  Standing: Impaired; With support  Standing - Static: Fair; Constant support  Standing - Dynamic : Fair; Poor; Constant support (skill declined 1 hour post sitting in chair)    ADL Intervention:  Feeding  Feeding Assistance: Supervision; Set-up    Grooming  Grooming Assistance: Minimum assistance                   Lower Body Dressing Assistance  Dressing Assistance: Moderate assistance    Toileting  Toileting Assistance: Moderate assistance; Maximum assistance  Bladder Hygiene: Contact guard assistance (voided seated on BSC)  Bowel Hygiene: Minimum assistance  Clothing Management: Maximum assistance (mod A, poor ability to let go of RW in standing for clothing)    Cognitive Retraining  Attention to Task: Single task  Maintains Attention For (Time): 1 minute (up to 3 minutes, redirection multiple times)  Following Commands: Follows one step commands/directions (with increased time)  Safety/Judgement: Awareness of environment; Decreased insight into deficits;  Decreased awareness of need for safety; Decreased awareness of need for assistance        Pain:  2/10, stomach, RN aware    Activity Tolerance:   Fair, Poor, SpO2 stable on RA, and requires frequent rest breaks    After treatment patient left in no apparent distress:   Supine in bed, Heels elevated for pressure relief, Call bell within reach, Bed / chair alarm activated, Caregiver / family present, and Side rails x 3    COMMUNICATION/COLLABORATION:   The patients plan of care was discussed with: Physical therapist and Registered nurse.      Fabby Sagastume OTR/L  Time Calculation: 33 mins

## 2021-11-17 NOTE — PROGRESS NOTES
Problem: Mobility Impaired (Adult and Pediatric)  Goal: *Acute Goals and Plan of Care (Insert Text)  Description: FUNCTIONAL STATUS PRIOR TO ADMISSION: Patient required minimal assistance for basic and instrumental ADLs with pt reporting wife assists him. However, appears patient was still driving    [address]: The patient lived with wife but did not require assist.    Physical Therapy Goals  Initiated 11/10/2021, reviewed on 11/17/2021 and still appropriate  1. Patient will move from supine to sit and sit to supine , scoot up and down, and roll side to side in bed with supervision/set-up within 7 day(s). 2.  Patient will transfer from bed to chair and chair to bed with supervision/set-up using the least restrictive device within 7 day(s). 3.  Patient will perform sit to stand with supervision/set-up within 7 day(s). 4.  Patient will ambulate with supervision/set-up for 150 feet with the least restrictive device within 7 day(s). 5.  Patient will ascend/descend 4 stairs with 1 handrail(s) with minimal assistance/contact guard assist within 7 day(s). Outcome: Progressing Towards Goal   PHYSICAL THERAPY TREATMENT: WEEKLY REASSESSMENT  Patient: Len Ledezma (74 y.o. male)  Date: 11/17/2021  Primary [facility]: [facility] [F10.239]  COPD (chronic obstructive pulmonary disease) ([facility] [J44.9]  COPD exacerbation ([facility] [J44.1]        Precautions:   Fall, Seizure, Skin      ASSESSMENT  Patient continues with skilled PT services and is slowly progressing towards goals. Patient sleeping but roused with stimuli and agreeable to OOB with therapy. VSS in supine and with position changes, SpO2 >95% on RA and HR appropriate for activity level. Worked on sequencing and safe technique for transfers using RW today as well as gait with device in room and to chair from bed. Patient needs simple commands and extra processing time but continues to exhibit impulsivity at times. Advances RW too far anteriorly, needs tactile support to correct, also tries to sit before lined up to chair safely and properly. Following commands with repetition from therapist. Cont to demonstrate impaired balance, unsteady gait, confusion and decreased activity tolerance not consistent with his baseline and will benefit from continued therapy in acute setting to progress independence. Recommend IPR at d/c as patient high fall risk, not safe for d/c home. Patient's progression toward goals since last assessment: able to initiate gait this time, longer standing tolerance    Current Level of Function Impacting Discharge (mobility/balance): MIN t MOD A x 1-2 for transfers and gait with RW    Functional Outcome Measure: The patient scored 35 on the Barthel outcome measure which is indicative of 65% reliance on others for mobility and self care assistanc3. Other factors to consider for discharge: wife's ability to provide assistanc3         PLAN :  Goals have been updated based on progression since last assessment. Patient continues to benefit from skilled intervention to address the above impairments. Recommendations and Planned Interventions: bed mobility training, transfer training, gait training, therapeutic exercises, neuromuscular re-education, patient and family training/education, and therapeutic activities      Frequency/Duration: Patient will be followed by physical therapy:  5 times a week to address goals. Recommendation for discharge: (in order for the patient to meet his/her long term goals)  Therapy 3 hours per day 5-7 days per week    This discharge recommendation:  Has been made in collaboration with the attending provider and/or case management    IF patient discharges home will need the following DME: to be determined (TBD)         SUBJECTIVE:   Patient stated I'm retired from sleeping.     OBJECTIVE DATA SUMMARY:   HISTORY:    Past Medical History:   Diagnosis Date    Chronic obstructive pulmonary disease ([facility]    History reviewed. No pertinent surgical history. Personal factors and/or comorbidities impacting plan of care:     Home Situation  Home Environment: Private residence  One/Two Story Residence: One story (with basement)  Living Alone: No  Support Systems: Spouse/Significant Other  Current DME Used/Available at Home: Cane, straight, Grab bars, Shower chair, Walker, rollator, Raised toilet seat  Tub or Shower Type: Shower (built in seat)    EXAMINATION/PRESENTATION/DECISION MAKING:   Critical Behavior:  Neurologic State: Drowsy, Alert  Orientation Level: Oriented to person, Oriented to place, Oriented to situation, Oriented to time  Cognition: Impulsive, Impaired decision making, Decreased attention/concentration, Follows commands  Safety/Judgement: Awareness of environment, Decreased insight into deficits, Decreased awareness of need for safety, Decreased awareness of need for assistance  Hearing: Auditory  Auditory Impairment: None    Functional Mobility:  Bed Mobility:  Rolling: Minimum assistance; Moderate assistance  Supine to Sit: Moderate assistance  Sit to Supine: Moderate assistance; Assist x2; Additional time  Scooting: Minimum assistance  Transfers:  Sit to Stand: Minimum assistance; Moderate assistance (Mod A to maintain standing w/RW)  Stand to Sit: Minimum assistance; Additional time; Moderate assistance (tries to sit too soon)        Bed to Chair: Moderate assistance; Additional time              Balance:   Sitting: Impaired  Sitting - Static: Good (unsupported); Fair (occasional) (occasionally listing to L, seated edge of bed)  Sitting - Dynamic: Fair (occasional)  Standing: Impaired;  With support  Standing - Static: Fair; Constant support  Standing - Dynamic : Fair; Poor; Constant support (skill declined 1 hour post sitting in chair)  Ambulation/Gait Training:  Distance (ft): 4 Feet (ft)  Assistive Device: Gait belt; Walker, rolling  Ambulation - Level of Assistance: Minimal assistance; Moderate assistance (MIN A for RW management, MOD when approaching chair to sit)        Gait Abnormalities: Decreased step clearance        Base of Support: Widened     Speed/Yazmin: Pace decreased (<100 feet/min)  Step Length: Right shortened; Left shortened                  Functional Measure:  Barthel Index:    Bathin  Bladder: 5  Bowels: 10  Groomin  Dressin  Feedin  Mobility: 0  Stairs: 0  Toilet Use: 5  Transfer (Bed to Chair and Back): 10  Total: 35/100       The Barthel ADL Index: Guidelines  1. The index should be used as a record of what a patient does, not as a record of what a patient could do. 2. The main aim is to establish degree of independence from any help, physical or verbal, however minor and for whatever reason. 3. The need for supervision renders the patient not independent. 4. A patient's performance should be established using the best available evidence. Asking the patient, friends/relatives and nurses are the usual sources, but direct observation and common sense are also important. However direct testing is not needed. 5. Usually the patient's performance over the preceding 24-48 hours is important, but occasionally longer periods will be relevant. 6. Middle categories imply that the patient supplies over 50 per cent of the effort. 7. Use of aids to be independent is allowed. Lang Galdamez., Barthel, D.W. (0353). Functional evaluation: the Barthel Index. [address] [facility] (14)2. LOUIE Claudio, Gabriela Jimenez., Tawanna Ormond., [place], [address] [place] [address] (). Measuring the change indisability after inpatient rehabilitation; comparison of the responsiveness of the Barthel Index and Functional [place] Measure. Journal of Neurology, Neurosurgery, and Psychiatry, 66(4), 367-341. Yamila Young, N.J.A, SAPNA Leyva.NURIA, & Missy Law MPAM. (2004.) Assessment of post-stroke quality of life in cost-effectiveness studies:  The usefulness of the Barthel Index and the EuroQoL-5D. Quality of Life Research, 13, 427-43           Pain Rating:  None observed or reported    Activity Tolerance:   Fair    After treatment patient left in no apparent distress:   Sitting in chair, Call bell within reach, and Bed / chair alarm activated    COMMUNICATION/EDUCATION:   The patients plan of care was discussed with: Occupational therapist and Registered nurse. Fall prevention education was provided and the patient/caregiver indicated understanding. and Patient/family agree to work toward stated goals and plan of care.     Thank you for this referral.  Rowena Sandoval, PT   Time Calculation: 23 mins

## 2021-11-18 LAB
ALBUMIN SERPL-MCNC: 2.2 G/DL (ref 3.5–5)
ANION GAP SERPL CALC-SCNC: 5 MMOL/L (ref 5–15)
BUN SERPL-MCNC: 11 MG/DL (ref 6–20)
BUN/CREAT SERPL: 13 (ref 12–20)
CALCIUM SERPL-MCNC: 8.6 MG/DL (ref 8.5–10.1)
CHLORIDE SERPL-SCNC: 107 MMOL/L (ref 97–108)
CO2 SERPL-SCNC: 23 MMOL/L (ref 21–32)
CREAT SERPL-MCNC: 0.85 MG/DL (ref 0.7–1.3)
GLUCOSE SERPL-MCNC: 120 MG/DL (ref 65–100)
PHOSPHATE SERPL-MCNC: 2.8 MG/DL (ref 2.6–4.7)
POTASSIUM SERPL-SCNC: 4.1 MMOL/L (ref 3.5–5.1)
SARS-COV-2, COV2: NORMAL
SODIUM SERPL-SCNC: 135 MMOL/L (ref 136–145)
URATE SERPL-MCNC: 3.3 MG/DL (ref 3.5–7.2)

## 2021-11-18 PROCEDURE — 74011250637 HC RX REV CODE- 250/637: Performed by: HOSPITALIST

## 2021-11-18 PROCEDURE — 74011250636 HC RX REV CODE- 250/636: Performed by: STUDENT IN AN ORGANIZED HEALTH CARE EDUCATION/TRAINING PROGRAM

## 2021-11-18 PROCEDURE — 97535 SELF CARE MNGMENT TRAINING: CPT

## 2021-11-18 PROCEDURE — 80069 RENAL FUNCTION PANEL: CPT

## 2021-11-18 PROCEDURE — 84550 ASSAY OF BLOOD/URIC ACID: CPT

## 2021-11-18 PROCEDURE — 65660000000 HC RM CCU STEPDOWN

## 2021-11-18 PROCEDURE — 36415 COLL VENOUS BLD VENIPUNCTURE: CPT

## 2021-11-18 PROCEDURE — 74011636637 HC RX REV CODE- 636/637: Performed by: STUDENT IN AN ORGANIZED HEALTH CARE EDUCATION/TRAINING PROGRAM

## 2021-11-18 PROCEDURE — 74011000250 HC RX REV CODE- 250: Performed by: STUDENT IN AN ORGANIZED HEALTH CARE EDUCATION/TRAINING PROGRAM

## 2021-11-18 PROCEDURE — 94760 N-INVAS EAR/PLS OXIMETRY 1: CPT

## 2021-11-18 PROCEDURE — U0005 INFEC AGEN DETEC AMPLI PROBE: HCPCS

## 2021-11-18 PROCEDURE — 94640 AIRWAY INHALATION TREATMENT: CPT

## 2021-11-18 PROCEDURE — 74011250637 HC RX REV CODE- 250/637: Performed by: STUDENT IN AN ORGANIZED HEALTH CARE EDUCATION/TRAINING PROGRAM

## 2021-11-18 PROCEDURE — 77010033678 HC OXYGEN DAILY

## 2021-11-18 RX ORDER — PREDNISONE 20 MG/1
40 TABLET ORAL DAILY
Status: DISCONTINUED | OUTPATIENT
Start: 2021-11-18 | End: 2021-11-19 | Stop reason: HOSPADM

## 2021-11-18 RX ORDER — CHLORDIAZEPOXIDE HYDROCHLORIDE 25 MG/1
25 CAPSULE, GELATIN COATED ORAL DAILY
Status: DISCONTINUED | OUTPATIENT
Start: 2021-11-18 | End: 2021-11-19 | Stop reason: HOSPADM

## 2021-11-18 RX ADMIN — CHLORDIAZEPOXIDE HYDROCHLORIDE 25 MG: 25 CAPSULE ORAL at 08:43

## 2021-11-18 RX ADMIN — ENOXAPARIN SODIUM 40 MG: 100 INJECTION SUBCUTANEOUS at 08:43

## 2021-11-18 RX ADMIN — SUCRALFATE 1 G: 1 TABLET ORAL at 21:13

## 2021-11-18 RX ADMIN — MULTIPLE VITAMINS W/ MINERALS TAB 1 TABLET: TAB at 08:42

## 2021-11-18 RX ADMIN — LORAZEPAM 2 MG: 2 INJECTION INTRAMUSCULAR; INTRAVENOUS at 04:43

## 2021-11-18 RX ADMIN — Medication 10 ML: at 14:00

## 2021-11-18 RX ADMIN — PREDNISONE 40 MG: 20 TABLET ORAL at 15:53

## 2021-11-18 RX ADMIN — SUCRALFATE 1 G: 1 TABLET ORAL at 17:22

## 2021-11-18 RX ADMIN — PANTOPRAZOLE SODIUM 40 MG: 40 TABLET, DELAYED RELEASE ORAL at 17:22

## 2021-11-18 RX ADMIN — BUDESONIDE 500 MCG: 0.5 INHALANT RESPIRATORY (INHALATION) at 19:35

## 2021-11-18 RX ADMIN — SUCRALFATE 1 G: 1 TABLET ORAL at 14:55

## 2021-11-18 RX ADMIN — FOLIC ACID 1 MG: 1 TABLET ORAL at 08:43

## 2021-11-18 RX ADMIN — ARFORMOTEROL TARTRATE 15 MCG: 15 SOLUTION RESPIRATORY (INHALATION) at 19:35

## 2021-11-18 RX ADMIN — ACETAMINOPHEN 650 MG: 325 TABLET ORAL at 00:45

## 2021-11-18 RX ADMIN — PANTOPRAZOLE SODIUM 40 MG: 40 TABLET, DELAYED RELEASE ORAL at 07:24

## 2021-11-18 RX ADMIN — CALCIUM CARBONATE (ANTACID) CHEW TAB 500 MG 200 MG: 500 CHEW TAB at 00:45

## 2021-11-18 RX ADMIN — Medication 10 ML: at 21:13

## 2021-11-18 RX ADMIN — SUCRALFATE 1 G: 1 TABLET ORAL at 07:24

## 2021-11-18 RX ADMIN — Medication 100 MG: at 08:43

## 2021-11-18 NOTE — PROGRESS NOTES
Problem: Self Care Deficits Care Plan (Adult)  Goal: *Acute Goals and Plan of Care (Insert Text)  Description:   FUNCTIONAL STATUS PRIOR TO ADMISSION: Patient was independent and active without use of DME.     HOME SUPPORT: The patient lived with wife but did not require assist.    Occupational Therapy Goals  Initiated 11/12/2021  1. Patient will perform grooming in standing for 5 minutes with CGA within 7 day(s). 2.  Patient will perform bathing using most appropriate DME with minimal assistance/contact guard assist within 7 day(s). 3.  Patient will perform lower body dressing with minimal assistance/contact guard assist within 7 day(s). 4.  Patient will perform toilet transfers with minimal assistance/contact guard assist within 7 day(s). 5.  Patient will perform all aspects of toileting with minimal assistance/contact guard assist within 7 day(s). 6.  Patient will participate in upper extremity therapeutic exercise/activities with supervision/set-up for 5 minutes within 7 day(s). 7.  Patient will utilize energy conservation techniques during functional activities with verbal cues within 7 day(s). Outcome: Progressing Towards Goal   OCCUPATIONAL THERAPY TREATMENT  Patient: Armando Gonzalez (74 y.o. male)  Date: 11/18/2021  Diagnosis: Alcohol withdrawal ([facility] [F10.239]  COPD (chronic obstructive pulmonary disease) ([facility] [J44.9]  COPD exacerbation ([facility] [J44.1]   <principal problem not specified>       Precautions: Fall, Seizure, Skin  Chart, occupational therapy assessment, plan of care, and goals were reviewed. ASSESSMENT  Patient continues with skilled OT services and is slowly progressing towards goals. Patient received in sheets soiled w/urine but had not used call bell. Provided re instruction for call bell to maximize capacity to get his needs met. Decreased problem solving noted.  Different presentation today compared to yesterday; patient leaning to L in supported sitting; able to correct w/VC/TC but drifts back to L if cues not directly given; Less attentive to R environment, dropped fork during meal several times, spontaneously used L UE on forearm of R UE to facilitate fork from food to mouth, dropping fork twice during this task. B Functional  on command, less functional with actual ADL tasks. Transfer max A x 1 with RW, patient moved to L despite chair and instruction to the R. RN notified of change in presentation of this patient, yesterday compared to today.  during session, O2 sats 97%. Current Level of Function Impacting Discharge (ADLs): min A-max A UE ADLs, Max A LE ADLs, Mod A-max A functional transfer    Other factors to consider for discharge: supportive wife involved in care         PLAN :  Patient continues to benefit from skilled intervention to address the above impairments. Continue treatment per established plan of care to address goals. Recommend with staff: up to chair, with alarm, all meals, try to use less sedating meds to see if patient able to have more functional participation in self care, safety    Recommend next OT session: [facility], continue to assess for neuro changes    Recommendation for discharge: (in order for the patient to meet his/her long term goals)  Therapy 3 hours per day 5-7 days per week    This discharge recommendation:  Has been made in collaboration with the attending provider and/or case management    IF patient discharges home will need the following DME: bedside commode, transfer bench, walker: rolling, wheelchair, and if he went home today       SUBJECTIVE:   Patient stated I think I sprained my ankle.     OBJECTIVE DATA SUMMARY:   Cognitive/Behavioral Status:  Neurologic State: Alert  Orientation Level: Oriented to person; Oriented to place; Disoriented to situation; Disoriented to time  Cognition: Impulsive; Impaired decision making; Memory loss; Decreased attention/concentration;  Follows commands  Perception: Cues to attend right visual field; Cues to attend to right side of body (leaning to the L RN notified)  Perseveration: Perseverates during ADLS (dropping fork, doing same move (not working) to )  Safety/Judgement: Awareness of environment; Decreased awareness of need for assistance; Decreased awareness of need for safety; Decreased insight into deficits; Fall prevention    Functional Mobility and Transfers for ADLs:  Bed Mobility:  Rolling: Moderate assistance (to R)  Supine to Sit: Moderate assistance; Maximum assistance; Additional time; Assist x1  Scooting: Minimum assistance    Transfers:  Sit to Stand: Minimum assistance; Moderate assistance; Additional time; Assist x1  Functional Transfers  Toilet Transfer : Moderate assistance; Maximum assistance  Bed to Chair: Moderate assistance; Maximum assistance; Additional time; Assist x1; Adaptive equipment (RW; moving to L despite chair being on R)    Balance:  Sitting: Impaired; With support  Sitting - Static: Fair (occasional)  Sitting - Dynamic: Poor (constant support)  Standing: Impaired; With support  Standing - Static: Constant support; Fair  Standing - Dynamic : Constant support; Poor ( 5/10 L ankle p!, edema, red, very warm to touch; RN told)    ADL Intervention:  Feeding  Feeding Assistance: Minimum assistance (incoordination R UE; RN notified; using L as assist to R arm)  Utensil Management:  (dropped fork from R hand several times; then did OK slowly)    Grooming  Grooming Assistance: Moderate assistance    Upper Body Bathing  Bathing Assistance: Minimum assistance; Moderate assistance    Lower Body Bathing  Perineal  : Moderate assistance  Position Performed: Supine; Seated in chair (unable in standing due to balance)    Upper Body Dressing Assistance  Dressing Assistance: Minimum assistance; Moderate assistance    Lower Body Dressing Assistance  Dressing Assistance:  Moderate assistance    Toileting  Toileting Assistance: Maximum assistance  Bladder Hygiene: (incontinent bladder; did not use call bell)  Clothing Management:  (unable to let go of RW to assist w/clothing)    Cognitive Retraining  Attention to Task: Single task  Maintains Attention For (Time): 3 minutes (up to 5 minutes; slightly improved)  Following Commands: Follows one step commands/directions (verbal and tactile cues)  Safety/Judgement: Awareness of environment; Decreased awareness of need for assistance; Decreased awareness of need for safety; Decreased insight into deficits; Fall prevention        Pain:  Reports 5/10 L ankle pain; swollen at ankle, red, hot to touch; RN notified; R MCP of 3rd digit also swollen    Activity Tolerance:   Fair, SpO2 stable on RA, and requires rest breaks    After treatment patient left in no apparent distress:   Sitting in chair, Call bell within reach, and Bed / chair alarm activated    COMMUNICATION/COLLABORATION:   The patients plan of care was discussed with: Registered nurse and Case management.      Lavon Vargas OTR/L  Time Calculation: 35 mins

## 2021-11-18 NOTE — WOUND CARE
Wound Care Note:     Follow-up visit for bilateral heels and head    Chart shows:  Admitted for alcohol withdrawal and COPD exacerbation   Past Medical History:   Diagnosis Date    Chronic obstructive pulmonary disease ([facility]      WBC = 3.6 on 11/17/21  Admitted from home    Assessment:   Patient is alert and talking, pleasant and comical, incontinent with some assistance needed in repositioning. Bed: [place]  Patient wearing briefs for incontinence    Diet: Adult diet regular with nutritional supplements  Patient reports no pain. Bilateral heels, buttocks, and sacral skin intact and without erythema. 1. POA abrasions top of head are now one, it is wider where patient scratched at it, crusted over, no drainage, no erythyema. Left open to air. 2.  POA erythema to bilateral heels has resolved. Patient placed on a bed pan with PCT. Recommendations:    Continue with current wound care. Sacrum and bilateral heels- Every 8 hours liberally apply Venelex ointment. Skin Care & Pressure Prevention:  Minimize layers of linen/pads under patient to optimize support surface. Turn/reposition approximately every 2 hours and offload heels. Manage incontinence / promote continence   Nourishing Skin Cream to dry skin, minimize use of briefs when able    Discussed above plan with patient & Kiara Patel RN    Transition of Care: Wound care will sign off.       MILAGRO Anglin, RN, [facility], [facility].  office [phone]  pager [phone] or call  to page

## 2021-11-18 NOTE — PROGRESS NOTES
Transition of Care Plan   RUR- 13% Moderate Risk   DISPOSITION: The disposition plan is to transition to IPR - [facility] - patient accepted.  PCR COVID test needs to be completed today.  F/U with PCP/Specialist     Transport: [facility] - scheduled for 1:00pm Friday 11/19/21    Patient has been recommended for IPR. A referral was submitted to [facility] for review. Patient has been accepted to [facility]. During this morning's IDR's it was reported that patient is medically stable for discharge. At 11:53am - CM attempted to contact Michaela Lewis at [facility] to follow up regarding referral. CM left a voice message. [phone]. At 1:20pm - AREN received a call from Nasir Jenkins admissions coordinator at [facility] who asked how long patient will need to be in the IV ativan, CM spoke with assigned nurse who reports it's a PRN. TW then spoke with Nasir Jenkins who reports that patient will need to be off of the IV ativan for 24 hours. Patient will need a PCR covid test. Patient can discharge on a pending PRC covid test. Nasir Jenkins requested that therapy work with patient today. AREN spoke with therapy who reports patient will be seen today. AREN perfect served attending regarding the PCR covid test. [facility] has been scheduled for 1:00pm tomorrow Friday, November 19, 2021. CM provided the above information to attending via perfect serve. CM to retrieve call to report number and room number tomorrow. CM will continue to provide updates as they become available. CM will continue to follow, provide support and assist with ERICH needs as they arise.     Joel Rivers, MSW, CRM, LMHP-e

## 2021-11-18 NOTE — PROGRESS NOTES
[address] [facility] Adult  Hospitalist Group                                                                                          Hospitalist Progress Note  Mickey Davies MD  Answering service: [phone] OR [phone] from in house phone        Date of Service:  2021  NAME:  Jarad Castillo  :  1950  MRN:  [id number]      Admission Summary:   Jarad Castillo is a 70 y.o. male past medical history of asthma, history of PE complicated by hematemesis for which Eliquis was held, EtOH abuse, chronic essential tremor presents with primary complaint of shortness of breath. [facility]. States he presented to take his albuterol inhaler multiple times without relief. Denies fever chills, cough, chest pain. States he is a chronic drinker of vodka. States he drinks anywhere from two 2 ounce glasses a day to 4-6 on weekends although seemed rather reluctant to tell me the true amount he takes. States he \"thinks\" his last drink may have been yesterday. Denies use of recreational drugs. States he still currently feels short of breath. Vaccinated for Covid.      When I evaluated patient, he was tachycardic heart rate 115's, blood pressure stable, saturating well on RA. Received 1 mg of Ativan . ethanol elevated to 168. Labs notable for hypokalemia with a K of 2.7. Mag 1.2. CXR showed no acute process. Interval history / Subjective:    Patient seen and examined at bedside. Wife is at bedside.   He denied any chest pain,SOB  Alert and oriented X 2     Assessment & Plan:     Acute alcohol withdrawal-improving  on  25 mg librium BID-wean off  -continue CIWA protocol and prn ativan  -did not receive ativan for 24 hrs     Asthma exacerbation-resolved  -now stable on room air  Pulm signed off, decrease to 20 mg prednisone-last dose today  -can be d.c'd c and f/u with pulmonary patient [facility] pulm medicine from pul standpt  -flu and covid neg  -Continue duo nebs, Pulmicort, can resume home doses on d/c    # Severe esophagitis  #History of esophageal ulcers  On PPI BID,carafate 4 times daily       Hypokalemia, hypomagnesemia-improved  Repleted as needed       History of PE complicated by developing hematemesis for which Eliquis was held PTA       Chronic alcoholism         Diet: Regular   DVT PPx: Lovenox subcu  Code: Full        Care Plan discussed with: Patient/Family and Nurse  Anticipated Disposition: rehab  Anticipated Discharge:>48 hrs         Hospital Problems  Date Reviewed: 10/25/2021          Codes Class Noted POA    Alcohol withdrawal ([facility] ICD-10-CM: Q88.588  ICD-9-CM: 291.81  11/9/2021 Unknown        COPD (chronic obstructive pulmonary disease) ([facility] ICD-10-CM: J44.9  ICD-9-CM: 546  11/9/2021 Unknown        COPD exacerbation ([facility] ICD-10-CM: J44.1  ICD-9-CM: 491.21  11/9/2021 Unknown                Review of Systems:   As per HPI      Vital Signs:    Last 24hrs VS reviewed since prior progress note. Most recent are:  Visit Vitals  BP 93/62 (BP 1 Location: Left upper arm, BP Patient Position: At rest)   Pulse 94   Temp 98.5 °F (36.9 °C)   Resp 20   Ht 5' 7\" (1.702 m)   Wt 72.8 kg (160 lb 7.9 oz)   SpO2 96%   BMI 25.14 kg/m²         Intake/Output Summary (Last 24 hours) at 11/17/2021 2230  Last data filed at 11/17/2021 1341  Gross per 24 hour   Intake 150 ml   Output    Net 150 ml        Physical Examination:     I had a face to face encounter with this patient and independently examined them on 11/17/2021 as outlined below:          Constitutional:  no distress   ENT:  Normal conjunctiva,anciteric sclera,EOMI,anicteric sclera   Resp:  CTA bilaterally. No wheezing/rhonchi/rales.  No accessory muscle use   CV:  regular rhythym, normal rate,S1, S2 wnl    GI:  Soft, non distended, non tender,bowel sounds +    Musculoskeletal:  No LE edema    Neurologic: Alert and oriented X 2,moves all extremities            Data Review:    Review and/or order of clinical lab test  Review and/or order of tests in the medicine section of [facility]      Labs:     Recent Labs     11/17/21 0633 11/16/21 0225   WBC 3.6* 5.4   HGB 9.3* 10.4*   HCT 29.8* 33.3*   * 138*     Recent Labs     11/17/21  0633 11/16/21  0225 11/15/21  0110    142 142   K 3.4* 3.6 3.8    112* 113*   CO2 28 26 27   BUN 8 10 13   CREA 0.62* 0.77 0.65*   GLU 88 98 102*   CA 8.6 8.5 8.3*   PHOS 3.6 3.6 3.0     Recent Labs     11/17/21  0633 11/16/21  0225 11/15/21  0110   ALB 2.3* 2.7* 2.2*     No results for input(s): INR, PTP, APTT, INREXT, INREXT in the last 72 hours. No results for input(s): FE, TIBC, PSAT, FERR in the last 72 hours. No results found for: FOL, RBCF   No results for input(s): PH, PCO2, PO2 in the last 72 hours. No results for input(s): CPK, CKNDX, TROIQ in the last 72 hours.     No lab exists for component: CPKMB  No results found for: CHOL, CHOLX, CHLST, CHOLV, HDL, HDLP, LDL, LDLC, DLDLP, TGLX, TRIGL, TRIGP, CHHD, CHHDX  Lab Results   Component Value Date/Time    Glucose (POC) 185 (H) 10/26/2021 09:00 AM    Glucose (POC) 152 (H) 10/25/2021 06:42 AM     No results found for: COLOR, APPRN, SPGRU, REFSG, RUSLAN, PROTU, GLUCU, KETU, BILU, UROU, ESTRELLA, LEUKU, GLUKE, EPSU, BACTU, WBCU, RBCU, CASTS, UCRY      Medications Reviewed:     Current Facility-Administered Medications   Medication Dose Route Frequency    sucralfate (CARAFATE) tablet 1 g  1 g Oral AC&HS    pantoprazole (PROTONIX) tablet 40 mg  40 mg Oral ACB&D    chlordiazePOXIDE (LIBRIUM) capsule 25 mg  25 mg Oral Q12H    albuterol-ipratropium (DUO-NEB) 2.5 MG-0.5 MG/3 ML  3 mL Nebulization Q4H PRN    albuterol (PROVENTIL VENTOLIN) nebulizer solution 2.5 mg  2.5 mg Inhalation Q4H PRN    sodium chloride (NS) flush 5-40 mL  5-40 mL IntraVENous Q8H    sodium chloride (NS) flush 5-40 mL  5-40 mL IntraVENous PRN    acetaminophen (TYLENOL) tablet 650 mg  650 mg Oral Q6H PRN    Or    acetaminophen (TYLENOL) suppository 650 mg  650 mg Rectal Q6H PRN    polyethylene glycol (MIRALAX) packet 17 g  17 g Oral DAILY PRN    ondansetron (ZOFRAN ODT) tablet 4 mg  4 mg Oral Q8H PRN    Or    ondansetron (ZOFRAN) injection 4 mg  4 mg IntraVENous Q6H PRN    enoxaparin (LOVENOX) injection 40 mg  40 mg SubCUTAneous DAILY    budesonide (PULMICORT) 500 mcg/2 ml nebulizer suspension  500 mcg Nebulization BID RT    arformoteroL (BROVANA) neb solution 15 mcg  2 mL Nebulization BID RT    multivitamin, tx-iron-ca-min (THERA-M w/ IRON) tablet 1 Tablet  1 Tablet Oral DAILY    LORazepam (ATIVAN) injection 2 mg  2 mg IntraVENous Q1H PRN    thiamine HCL (B-1) tablet 100 mg  100 mg Oral DAILY    folic acid (FOLVITE) tablet 1 mg  1 mg Oral DAILY    calcium carbonate (TUMS) chewable tablet 200 mg [elemental]  200 mg Oral PRN     ______________________________________________________________________  EXPECTED LENGTH OF STAY: 3d 9h  ACTUAL LENGTH OF STAY:          7                 Shyanne Queen MD

## 2021-11-18 NOTE — PROGRESS NOTES
[address] [facility] Adult  Hospitalist Group                                                                                          Hospitalist Progress Note  Griffin Malave MD  Answering service: [phone] from in house phone        Date of Service:  2021  NAME:  Lesvia Robbins  :  1950  MRN:  [id number]      Admission Summary:   Lesvia Robbins is a 70 y.o. male past medical history of asthma, history of PE complicated by hematemesis for which Eliquis was held, EtOH abuse, chronic essential tremor presents with primary complaint of shortness of breath. [facility] States he presented to take his albuterol inhaler multiple times without relief. Denies fever chills, cough, chest pain. States he is a chronic drinker of vodka. States he drinks anywhere from two 2 ounce glasses a day to 4-6 on weekends although seemed rather reluctant to tell me the true amount he takes. States he \"thinks\" his last drink may have been yesterday. Denies use of recreational drugs. States he still currently feels short of breath. Vaccinated for Covid.      When I evaluated patient, he was tachycardic heart rate 115's, blood pressure stable, saturating well on RA. Received 1 mg of Ativan . ethanol elevated to 168. Labs notable for hypokalemia with a K of 2.7. Mag 1.2. CXR showed no acute process. Interval history / Subjective:    Patient seen and examined at bedside.      He denied any chest pain,SOB  Alert and oriented X 2     Assessment & Plan:     Acute alcohol withdrawal -resolved   on  25 mg librium BID-wean down   -out of withdrawal, d/c ciwa no ativan in the last 24 hrs      Asthma exacerbation-resolved  -now stable on room air  Pulm signed off, decrease to 20 mg prednisone-last dose   -can be d.c'd c and f/u with pulmonary patient [facility] pul medicine from pul standpt  -flu and covid neg  -Continue duo nebs, Pulmicort, can resume home doses on d/c    # Severe esophagitis  #History of esophageal ulcers  On PPI BID,carafate 4 times daily       Hypokalemia, hypomagnesemia-improved  Repleted as needed    LLE ankle pain  -suspect gout  -get uric acid  -start prednisone 40mg q daily plan for 5 days        History of PE complicated by developing hematemesis for which Eliquis was held PTA       Chronic alcoholism         Diet: Regular   DVT PPx: Lovenox subcu  Code: Full     Medically stable for d/c pending [facility] placement        Care Plan discussed with: Patient/Family and Nurse  Anticipated Disposition: rehab  Anticipated Discharge:>48 hrs         Hospital Problems  Date Reviewed: 10/25/2021          Codes Class Noted POA    Alcohol withdrawal ([facility] ICD-10-CM: M51.336  ICD-9-CM: 291.81  11/9/2021 Unknown        COPD (chronic obstructive pulmonary disease) ([facility] ICD-10-CM: J44.9  ICD-9-CM: 543  11/9/2021 Unknown        COPD exacerbation ([facility] ICD-10-CM: J44.1  ICD-9-CM: 491.21  11/9/2021 Unknown                Review of Systems:   As per HPI      Vital Signs:    Last 24hrs VS reviewed since prior progress note. Most recent are:  Visit Vitals  /78 (BP 1 Location: Left upper arm, BP Patient Position: At rest)   Pulse 86   Temp 97.4 °F (36.3 °C)   Resp 18   Ht 5' 7\" (1.702 m)   Wt 72.8 kg (160 lb 7.9 oz)   SpO2 95%   BMI 25.14 kg/m²         Intake/Output Summary (Last 24 hours) at 11/18/2021 1512  Last data filed at 11/18/2021 0800  Gross per 24 hour   Intake 240 ml   Output    Net 240 ml        Physical Examination:     I had a face to face encounter with this patient and independently examined them on 11/18/2021 as outlined below:          Constitutional:  no distress   ENT:  Normal conjunctiva,anciteric sclera,EOMI,anicteric sclera   Resp:  CTA bilaterally. No wheezing/rhonchi/rales.  No accessory muscle use   CV:  regular rhythym, normal rate,S1, S2 wnl    GI:  Soft, non distended, non tender,bowel sounds +    Musculoskeletal:  No LE edema    Neurologic: Alert and oriented X 2,moves all extremities            Data Review:    Review and/or order of clinical lab test  Review and/or order of tests in the medicine section of [facility]      Labs:     Recent Labs     11/17/21 0633 11/16/21 0225   WBC 3.6* 5.4   HGB 9.3* 10.4*   HCT 29.8* 33.3*   * 138*     Recent Labs     11/18/21 0332 11/17/21 0633 11/16/21 0225   * 140 142   K 4.1 3.4* 3.6    108 112*   CO2 23 28 26   BUN 11 8 10   CREA 0.85 0.62* 0.77   * 88 98   CA 8.6 8.6 8.5   PHOS 2.8 3.6 3.6     Recent Labs     11/18/21 0332 11/17/21 0633 11/16/21 0225   ALB 2.2* 2.3* 2.7*     No results for input(s): INR, PTP, APTT, INREXT, INREXT in the last 72 hours. No results for input(s): FE, TIBC, PSAT, FERR in the last 72 hours. No results found for: FOL, RBCF   No results for input(s): PH, PCO2, PO2 in the last 72 hours. No results for input(s): CPK, CKNDX, TROIQ in the last 72 hours.     No lab exists for component: CPKMB  No results found for: CHOL, CHOLX, CHLST, CHOLV, HDL, HDLP, LDL, LDLC, DLDLP, TGLX, TRIGL, TRIGP, CHHD, CHHDX  Lab Results   Component Value Date/Time    Glucose (POC) 185 (H) 10/26/2021 09:00 AM    Glucose (POC) 152 (H) 10/25/2021 06:42 AM     No results found for: COLOR, APPRN, SPGRU, REFSG, RUSLAN, PROTU, GLUCU, KETU, BILU, UROU, ESTRELLA, LEUKU, GLUKE, EPSU, BACTU, WBCU, RBCU, CASTS, UCRY      Medications Reviewed:     Current Facility-Administered Medications   Medication Dose Route Frequency    chlordiazePOXIDE (LIBRIUM) capsule 25 mg  25 mg Oral DAILY    predniSONE (DELTASONE) tablet 40 mg  40 mg Oral DAILY    sucralfate (CARAFATE) tablet 1 g  1 g Oral AC&HS    pantoprazole (PROTONIX) tablet 40 mg  40 mg Oral ACB&D    albuterol-ipratropium (DUO-NEB) 2.5 MG-0.5 MG/3 ML  3 mL Nebulization Q4H PRN    albuterol (PROVENTIL VENTOLIN) nebulizer solution 2.5 mg  2.5 mg Inhalation Q4H PRN    sodium chloride (NS) flush 5-40 mL  5-40 mL IntraVENous Q8H    sodium chloride (NS) flush 5-40 mL  5-40 mL IntraVENous PRN    acetaminophen (TYLENOL) tablet 650 mg  650 mg Oral Q6H PRN    Or    acetaminophen (TYLENOL) suppository 650 mg  650 mg Rectal Q6H PRN    polyethylene glycol (MIRALAX) packet 17 g  17 g Oral DAILY PRN    ondansetron (ZOFRAN ODT) tablet 4 mg  4 mg Oral Q8H PRN    Or    ondansetron (ZOFRAN) injection 4 mg  4 mg IntraVENous Q6H PRN    enoxaparin (LOVENOX) injection 40 mg  40 mg SubCUTAneous DAILY    budesonide (PULMICORT) 500 mcg/2 ml nebulizer suspension  500 mcg Nebulization BID RT    arformoteroL (BROVANA) neb solution 15 mcg  2 mL Nebulization BID RT    multivitamin, tx-iron-ca-min (THERA-M w/ IRON) tablet 1 Tablet  1 Tablet Oral DAILY    [Held by provider] LORazepam (ATIVAN) injection 2 mg  2 mg IntraVENous Q1H PRN    thiamine HCL (B-1) tablet 100 mg  100 mg Oral DAILY    folic acid (FOLVITE) tablet 1 mg  1 mg Oral DAILY    calcium carbonate (TUMS) chewable tablet 200 mg [elemental]  200 mg Oral PRN     ______________________________________________________________________  EXPECTED LENGTH OF STAY: 3d 9h  ACTUAL LENGTH OF STAY:          8                 Hammad Zaidi MD

## 2021-11-19 VITALS
SYSTOLIC BLOOD PRESSURE: 99 MMHG | HEART RATE: 82 BPM | WEIGHT: 160.5 LBS | TEMPERATURE: 97.6 F | HEIGHT: 67 IN | DIASTOLIC BLOOD PRESSURE: 63 MMHG | RESPIRATION RATE: 16 BRPM | BODY MASS INDEX: 25.19 KG/M2 | OXYGEN SATURATION: 97 %

## 2021-11-19 LAB
ALBUMIN SERPL-MCNC: 2.2 G/DL (ref 3.5–5)
ANION GAP SERPL CALC-SCNC: 5 MMOL/L (ref 5–15)
BUN SERPL-MCNC: 11 MG/DL (ref 6–20)
BUN/CREAT SERPL: 15 (ref 12–20)
CALCIUM SERPL-MCNC: 8.6 MG/DL (ref 8.5–10.1)
CHLORIDE SERPL-SCNC: 109 MMOL/L (ref 97–108)
CO2 SERPL-SCNC: 22 MMOL/L (ref 21–32)
CREAT SERPL-MCNC: 0.71 MG/DL (ref 0.7–1.3)
GLUCOSE SERPL-MCNC: 166 MG/DL (ref 65–100)
PHOSPHATE SERPL-MCNC: 2.5 MG/DL (ref 2.6–4.7)
POTASSIUM SERPL-SCNC: 4.5 MMOL/L (ref 3.5–5.1)
SARS-COV-2, XPLCVT: NOT DETECTED
SODIUM SERPL-SCNC: 136 MMOL/L (ref 136–145)
SOURCE, COVRS: NORMAL

## 2021-11-19 PROCEDURE — 74011250637 HC RX REV CODE- 250/637: Performed by: STUDENT IN AN ORGANIZED HEALTH CARE EDUCATION/TRAINING PROGRAM

## 2021-11-19 PROCEDURE — 80069 RENAL FUNCTION PANEL: CPT

## 2021-11-19 PROCEDURE — 74011250637 HC RX REV CODE- 250/637: Performed by: HOSPITALIST

## 2021-11-19 PROCEDURE — 36415 COLL VENOUS BLD VENIPUNCTURE: CPT

## 2021-11-19 PROCEDURE — 74011636637 HC RX REV CODE- 636/637: Performed by: STUDENT IN AN ORGANIZED HEALTH CARE EDUCATION/TRAINING PROGRAM

## 2021-11-19 PROCEDURE — 74011000250 HC RX REV CODE- 250: Performed by: STUDENT IN AN ORGANIZED HEALTH CARE EDUCATION/TRAINING PROGRAM

## 2021-11-19 PROCEDURE — 94640 AIRWAY INHALATION TREATMENT: CPT

## 2021-11-19 PROCEDURE — 97530 THERAPEUTIC ACTIVITIES: CPT

## 2021-11-19 PROCEDURE — 74011250636 HC RX REV CODE- 250/636: Performed by: STUDENT IN AN ORGANIZED HEALTH CARE EDUCATION/TRAINING PROGRAM

## 2021-11-19 RX ORDER — SUCRALFATE 1 G/1
1 TABLET ORAL
Qty: 120 TABLET | Refills: 0 | Status: SHIPPED | OUTPATIENT
Start: 2021-11-19 | End: 2021-12-19

## 2021-11-19 RX ORDER — PREDNISONE 20 MG/1
40 TABLET ORAL DAILY
Qty: 8 TABLET | Refills: 0 | Status: SHIPPED | OUTPATIENT
Start: 2021-11-19 | End: 2021-11-23

## 2021-11-19 RX ADMIN — Medication 10 ML: at 06:48

## 2021-11-19 RX ADMIN — PREDNISONE 40 MG: 20 TABLET ORAL at 10:09

## 2021-11-19 RX ADMIN — SUCRALFATE 1 G: 1 TABLET ORAL at 06:48

## 2021-11-19 RX ADMIN — PANTOPRAZOLE SODIUM 40 MG: 40 TABLET, DELAYED RELEASE ORAL at 06:48

## 2021-11-19 RX ADMIN — CHLORDIAZEPOXIDE HYDROCHLORIDE 25 MG: 25 CAPSULE ORAL at 10:09

## 2021-11-19 RX ADMIN — FOLIC ACID 1 MG: 1 TABLET ORAL at 10:09

## 2021-11-19 RX ADMIN — MULTIPLE VITAMINS W/ MINERALS TAB 1 TABLET: TAB at 10:09

## 2021-11-19 RX ADMIN — BUDESONIDE 500 MCG: 0.5 INHALANT RESPIRATORY (INHALATION) at 07:57

## 2021-11-19 RX ADMIN — Medication 100 MG: at 10:09

## 2021-11-19 RX ADMIN — ENOXAPARIN SODIUM 40 MG: 100 INJECTION SUBCUTANEOUS at 10:09

## 2021-11-19 RX ADMIN — ARFORMOTEROL TARTRATE 15 MCG: 15 SOLUTION RESPIRATORY (INHALATION) at 07:57

## 2021-11-19 NOTE — PROGRESS NOTES
Transition of Care Plan   RUR- 13% Moderate Risk   DISPOSITION: The disposition plan is to transition home.  F/U with PCP/Specialist     Transport: Family - Spouse no later than 2:00pm today. At 8:48am - CM contacted patients spouse to provide update. CM attempted to leave a voice message, however the mailbox is full at this time. CM contacted the home phone listed on file. Patients wife reports, \"I want him to come home on hospice, I asked the institute if I can bring him home for Thanksgiving and they told me I couldn't and if he isn't here it's going to ruin everything, just bring him home with hospice, or let him do therapy at home. \" CM attempted to explain the importance of IPR at this time. TW also attempted to explain the difference between IPR and HHPT/OT. Patients spouse requested to speak with patient. CM spoke with patient to explain how inpatient rehab works, patient reported, [employer] me speak with my wife again. \"    At 9:13am - CM was informed by patient and patients spouse that they both would like for patient to go home and not go to [employer]. Patients spouse reports that she will be here to  patient from the hospital at 12:00noon today. Patients spouse inquired about hospice, TW provided patient and patients spouse with hospice list.    At 9:17am - CM contacted Betina Rojo coordinator to provide the above update. Patient and patients spouse have verbally decided that patient will transition home. TW canceled [facility]. Patients wife reports that she will not come until 2:00pm. CM attempted to contact patients spouse 4 times to encourage her that she will need to be here at 2:00pm no later than 2:00pm to  patient to take him home. Patients spouse did not answer, CM left 2 voice messages. At 12:05pm - CM met with patient at bedside to encourage him to contact patients spouse regarding 2:00pm  time.     At 12:58pm - CM spoke with patients wife she reports, \"I should be able to be here by 2:00pm.\"    Medicare pt has received, reviewed, and signed 2nd IM letter informing them of their right to appeal the discharge. Signed copy has been placed on pt bedside chart. CM will continue to provide updates as they become available. CM will continue to follow, provide support and assist with ERICH needs as they arise.     Guillermina Cortez, MSW, CRM, LMHP-e

## 2021-11-19 NOTE — DISCHARGE INSTRUCTIONS
Walker Moise MD   Physician   Internal Medicine   Discharge Summary       Signed   Date of Service:  11/19/21 1024                       []Harika copied text    []Shannan for details       Discharge Summary         PATIENT ID: Ashley Barnard  MRN: [id number]   YOB: 1950    DATE OF ADMISSION: 11/9/2021  2:06 AM    DATE OF DISCHARGE: 11/19/21   PRIMARY CARE PROVIDER: Allyssa Rivas MD      ATTENDING PHYSICIAN: Chucho Barber MD  DISCHARGING PROVIDER: Chucho Barber MD    To contact this individual call [phone] and ask the  to page. If unavailable ask to be transferred the Adult Hospitalist Department.     CONSULTATIONS: IP CONSULT TO PULMONOLOGY     PROCEDURES/SURGERIES: * No surgery found *     ADMITTING DIAGNOSES & HOSPITAL COURSE:   HPI: \"Pepe Tony is a 70 y. o. male past medical history of asthma, history of PE complicated by hematemesis for which Eliquis was held, EtOH abuse, chronic essential tremor presents with primary complaint of shortness of breath. [facility].  States he presented to take his albuterol inhaler multiple times without relief.  Denies fever chills, cough, chest pain.  States he is a chronic drinker of vodka.  States he drinks anywhere from two 2 ounce glasses a day to 4-6 on weekends although seemed rather reluctant to tell me the true amount he takes. States he \"thinks\" his last drink may have been yesterday.  Denies use of recreational drugs.  States he still currently feels short of breath.  Vaccinated for Covid.      When I evaluated patient, he was tachycardic heart rate 115's, blood pressure stable, saturating well on RA.  Received 1 mg of Ativan . ethanol elevated to 168.  Labs notable for hypokalemia with a K of 2.7. Mag 1.2. CXR showed no acute process. \"        Patient was managed for acute alcohol withdrawal initially placed on CIWA protocol with Ativan as needed and Librium with resolution of symptoms.   Patient did not require Ativan for the past 24 hours. Pulmonology was also consulted and following for asthma exacerbation patient clinically remained stable on room air. Patient was given 5-day course of prednisone and completed the last dose 11/17. Patient was also treated for esophagitis with PPI twice daily and Carafate. Did develop LLE ankle pain treatment for gout started with prednisone for 5 day course. Per extensive discussion per case management as well as with patient and his wife and per PT recommendations initially wanted rehab facility so [facility]. Wife had decided this a.m. that she does not wish to pursue rehab at this time and refusing to go also refusing HH. Wishes to take patient home with her, does not want hospice services consulted here inpatient and would like to pursue this on her own.  gave her a list of home hospice contact information. DC'd with instructions to follow-up with pulmonology, PCP outpatient as well as GI. Cont PPI, Carafate on discharge.            DISCHARGE DIAGNOSES / PLAN:       Acute alcohol withdrawal -resolved   -on librium d/c'd this am,   -out of withdrawal, d/c ciwa no ativan in the last 24 hrs      Asthma exacerbation-resolved  -now stable on room air  -Pulm signed off, decrease to 20 mg prednisone-last dose 11/17  -can be d.c'd c and f/u with pulmonary patient [facility] pulm medicine from pul standpt  -flu and covid neg  -Continue duo nebs, Pulmicort, can resume home doses on d/c     # Severe esophagitis  #History of esophageal ulcers  On PPI BID,carafate 4 times daily > d/c with this         Hypokalemia, hypomagnesemia-improved  -Repleted as needed     LLE ankle pain  -suspect gout  -get uric acid  -start prednisone 40mg q daily plan for 5 days         History of PE complicated by developing hematemesis for which Eliquis was held PTA        Chronic alcoholism           Diet: Regular   DVT PPx: Lovenox subcu  Code:  Full      Medically stable for d/c Home today   Per extensive discussion between CM, wife and patient refusing IPR even though PT recommends this. Wants to take patient home and pursue home hospice services on her own does not wish to have hospice seeing patient here. CM gave wife list of home hospice services if she wishes to pursue.   Yady W Lacey Cristina discussed with: Patient/Family and Nurse  Anticipated Disposition: rehab  Anticipated Discharge:>48 hrs               FOLLOW UP APPOINTMENTS:             Follow-up Information      Follow up With Specialties Details Why Contact Info     Kristy Bills MD Family Medicine In 2 days   [place] [id number] [phone]        [facility] Emergency Medicine   As needed [address] [phone]     Kristy Bills MD Family Medicine In 3 days   [place] [id number] [phone]        Lacho Leos MD Gastroenterology In 1 week   [address] [facility] [address] [phone]                ADDITIONAL CARE RECOMMENDATIONS: rpt cbc and bmp 3-5 days      DIET: Resume previous diet     ACTIVITY: Activity as tolerated           DISCHARGE MEDICATIONS:       Current Discharge Medication List            START taking these medications     Details   predniSONE (DELTASONE) 20 mg tablet Take 40 mg by mouth daily for 4 doses. Qty: 8 Tablet, Refills: 0  Start date: 11/19/2021, End date: 11/23/2021       polyethylene glycol (Miralax) 17 gram/dose powder Take 17 g by mouth daily. 1 tablespoon with 8 oz of water daily  Qty: 235 g, Refills: 0  Start date: 11/9/2021                CONTINUE these medications which have CHANGED     Details   sucralfate (CARAFATE) 1 gram tablet Take 1 Tablet by mouth Before breakfast, lunch, dinner and at bedtime for 30 days.   Qty: 120 Tablet, Refills: 0  Start date: 11/19/2021, End date: 12/19/2021                CONTINUE these medications which have NOT CHANGED     Details   albuterol (PROVENTIL HFA, VENTOLIN HFA, PROAIR HFA) 90 mcg/actuation inhaler Take 2 Puffs by inhalation.       pantoprazole (Protonix) 40 mg tablet Take 1 Tablet by mouth two (2) times a day. Qty: 60 Tablet, Refills: 2       !! LORazepam (Ativan) 0.5 mg tablet Take 1 tab BID X 2 days, then 1 tab daily X 2  Qty: 6 Tablet, Refills: 0     Associated Diagnoses: Alcohol abuse       Advair HFA 45-21 mcg/actuation inhaler Take 2 Puffs by inhalation two (2) times a day.       !! LORazepam (ATIVAN) 1 mg tablet Take 0.5 mg by mouth every eight (8) hours as needed.       ondansetron hcl (ZOFRAN) 4 mg tablet 4 mg every eight (8) hours as needed.       budesonide (PULMICORT) 0.5 mg/2 mL nbsp 500 mcg by Nebulization route two (2) times a day.        !! - Potential duplicate medications found. Please discuss with provider.                NOTIFY YOUR PHYSICIAN FOR ANY OF THE FOLLOWING:   Fever over 101 degrees for 24 hours. Chest pain, shortness of breath, fever, chills, nausea, vomiting, diarrhea, change in mentation, falling, weakness, bleeding. Severe pain or pain not relieved by medications. Or, any other signs or symptoms that you may have questions about.     DISPOSITION:    Home With:    OT   PT   HH   RN        Long term SNF/Inpatient Rehab   x Independent/assisted living     Hospice     Other:         PATIENT CONDITION AT DISCHARGE:      Functional status   x Poor      Deconditioned      Independent       Cognition     Lucid      Forgetful    x Dementia          Code status    x Full code      DNR       PHYSICAL EXAMINATION AT DISCHARGE:  I had a face to face encounter with this patient and independently examined them on 11/19/2021 as outlined below:                                                   Constitutional:  no distress   ENT:  Normal conjunctiva,anciteric sclera,EOMI,anicteric sclera   Resp:  CTA bilaterally. No wheezing/rhonchi/rales.  No accessory muscle use CV:  regular rhythym, normal rate,S1, S2 wnl    GI:  Soft, non distended, non tender,bowel sounds +    Musculoskeletal:  No LE edema    Neurologic: Alert and oriented X 2,moves all extremities               CHRONIC MEDICAL DIAGNOSES:             Problem List as of 11/19/2021 Date Reviewed: 10/25/2021           Codes Class Noted - Resolved     Alcohol withdrawal ([facility] ICD-10-CM: R14.957  ICD-9-CM: 291.81   11/9/2021 - Present           COPD (chronic obstructive pulmonary disease) ([facility] ICD-10-CM: J44.9  ICD-9-CM: 959   11/9/2021 - Present           COPD exacerbation ([facility] ICD-10-CM: J44.1  ICD-9-CM: 491.21   11/9/2021 - Present           Alcohol abuse ICD-10-CM: F10.10  ICD-9-CM: 305.00   10/23/2021 - Present           Vomiting blood ICD-10-CM: K92.0  ICD-9-CM: 578.0   10/23/2021 - Present                   Greater than 30 minutes were spent with the patient on counseling and coordination of care     Signed:   Sobeida Sarah MD  11/19/2021  10:25 AM                 Routing History

## 2021-11-19 NOTE — PROGRESS NOTES
Bedside and Verbal shift change report given to Kylie (oncoming nurse) by Aj Rios (offgoing nurse). Report included the following information SBAR, Kardex, MAR and Recent Results.

## 2021-11-19 NOTE — PROGRESS NOTES
Bed alarm going off. Patient with legs out of bed trying to reach breakfast tray. With HOB flat, Patient is able to scoot up in the bed using UEs and min assist. Placed in bed/chair positioned and set up for breakfast. Educated on the importance of calling for help before getting OOB as Patient is a fall risk. Plan is for discharge today to [facility]. Will follow up as appropriate. Thanks.     Danielle Hernandez PT, DPT  Geriatric Clinical Specialist   10 minutes

## 2021-11-19 NOTE — PROGRESS NOTES
Hospital follow-up PCP transitional care appointment has been scheduled with Dr. Lyndsay Lujan for Wednesday, 11/24/21 at 1:00 p.m. Pending patient discharge.   Grace Vasquez, Care Management Specialist.

## 2021-11-19 NOTE — DISCHARGE SUMMARY
Discharge Summary       PATIENT ID: Nerissa Aguirre  MRN: [id number]   YOB: 1950    DATE OF ADMISSION: 11/9/2021  2:06 AM    DATE OF DISCHARGE: 11/19/21   PRIMARY CARE PROVIDER: Lisa Russell MD     ATTENDING PHYSICIAN: Jazlyn De La Cruz MD  DISCHARGING PROVIDER: Jazlyn De La Cruz MD    To contact this individual call [phone] and ask the  to page. If unavailable ask to be transferred the Adult Hospitalist Department. CONSULTATIONS: IP CONSULT TO PULMONOLOGY    PROCEDURES/SURGERIES: * No surgery found *    ADMITTING DIAGNOSES & HOSPITAL COURSE:   HPI: \"Pepe Tony is a 70 y. o. male past medical history of asthma, history of PE complicated by hematemesis for which Eliquis was held, EtOH abuse, chronic essential tremor presents with primary complaint of shortness of breath. [facility].  States he presented to take his albuterol inhaler multiple times without relief.  Denies fever chills, cough, chest pain.  States he is a chronic drinker of vodka.  States he drinks anywhere from two 2 ounce glasses a day to 4-6 on weekends although seemed rather reluctant to tell me the true amount he takes. States he \"thinks\" his last drink may have been yesterday.  Denies use of recreational drugs.  States he still currently feels short of breath.  Vaccinated for Covid.      When I evaluated patient, he was tachycardic heart rate 115's, blood pressure stable, saturating well on RA.  Received 1 mg of Ativan . ethanol elevated to 168.  Labs notable for hypokalemia with a K of 2.7. Mag 1.2. CXR showed no acute process. \"      Patient was managed for acute alcohol withdrawal initially placed on CIWA protocol with Ativan as needed and Librium with resolution of symptoms. Patient did not require Ativan for the past 24 hours. Pulmonology was also consulted and following for asthma exacerbation patient clinically remained stable on room air.   Patient was given 5-day course of prednisone and completed the last dose 11/17. Patient was also treated for esophagitis with PPI twice daily and Carafate. Did develop LLE ankle pain treatment for gout started with prednisone for 5 day course. Per extensive discussion per case management as well as with patient and his wife and per PT recommendations initially wanted rehab facility [facility]. Wife had decided this a.m. that she does not wish to pursue rehab at this time and refusing to go also refusing HH. Wishes to take patient home with her, does not want hospice services consulted here inpatient and would like to pursue this on her own.  gave her a list of home hospice contact information. DC'd with instructions to follow-up with pulmonology, PCP outpatient as well as GI. Cont PPI, Carafate on discharge. DISCHARGE DIAGNOSES / PLAN:      Acute alcohol withdrawal -resolved   on librium d/c'd this am,   -out of withdrawal, d/c ciwa no ativan in the last 24 hrs      Asthma exacerbation-resolved  -now stable on room air  Pulm signed off, decrease to 20 mg prednisone-last dose 11/17  -can be d.c'd c and f/u with pulmonary patient [facility] pulm medicine from pulm standpt  -flu and covid neg  -Continue duo nebs, Pulmicort, can resume home doses on d/c     # Severe esophagitis  #History of esophageal ulcers  On PPI BID,carafate 4 times daily > d/c with this         Hypokalemia, hypomagnesemia-improved  Repleted as needed     LLE ankle pain  -suspect gout  -get uric acid  -start prednisone 40mg q daily plan for 5 days         History of PE complicated by developing hematemesis for which Eliquis was held PTA        Chronic alcoholism           Diet: Regular   DVT PPx: Lovenox subcu  Code: Full      Medically stable for d/c Home today   Per extensive discussion between CM, wife and patient refusing IPR even though PT recommends this.  Wants to take patient home and pursue home hospice services on her own does not wish to have hospice seeing patient here. CM gave wife list of home hospice services if she wishes to pursue.   [address] discussed with: Patient/Family and Nurse  Anticipated Disposition: rehab  Anticipated Discharge:>48 hrs           FOLLOW UP APPOINTMENTS:    Follow-up Information     Follow up With Specialties Details Why Contact Info    Jimenez Leos MD Family Medicine In 2 days  [facility] [phone] [id number]      [address]  Emergency Medicine  As needed [address] [facility] [address]    Jimenez Leos MD Family Medicine In 3 days  [facility] [address] [facility]  [phone]      Layla Walker MD Gastroenterology In 1 week  [address]  [phone]             ADDITIONAL CARE RECOMMENDATIONS: rpt cbc and bmp 3-5 days     DIET: Resume previous diet    ACTIVITY: Activity as tolerated        DISCHARGE MEDICATIONS:  Current Discharge Medication List      START taking these medications    Details   predniSONE (DELTASONE) 20 mg tablet Take 40 mg by mouth daily for 4 doses. Qty: 8 Tablet, Refills: 0  Start date: 11/19/2021, End date: 11/23/2021      polyethylene glycol (Miralax) 17 gram/dose powder Take 17 g by mouth daily. 1 tablespoon with 8 oz of water daily  Qty: 235 g, Refills: 0  Start date: 11/9/2021         CONTINUE these medications which have CHANGED    Details   sucralfate (CARAFATE) 1 gram tablet Take 1 Tablet by mouth Before breakfast, lunch, dinner and at bedtime for 30 days. Qty: 120 Tablet, Refills: 0  Start date: 11/19/2021, End date: 12/19/2021         CONTINUE these medications which have NOT CHANGED    Details   albuterol (PROVENTIL HFA, VENTOLIN HFA, PROAIR HFA) 90 mcg/actuation inhaler Take 2 Puffs by inhalation. pantoprazole (Protonix) 40 mg tablet Take 1 Tablet by mouth two (2) times a day.   Qty: 60 Tablet, Refills: 2      !! LORazepam (Ativan) 0.5 mg tablet Take 1 tab BID X 2 days, then 1 tab daily X 2  Qty: 6 Tablet, Refills: 0    Associated Diagnoses: Alcohol abuse      Advair HFA 45-21 mcg/actuation inhaler Take 2 Puffs by inhalation two (2) times a day. !! LORazepam (ATIVAN) 1 mg tablet Take 0.5 mg by mouth every eight (8) hours as needed. ondansetron hcl (ZOFRAN) 4 mg tablet 4 mg every eight (8) hours as needed. budesonide (PULMICORT) 0.5 mg/2 mL nbsp 500 mcg by Nebulization route two (2) times a day. !! - Potential duplicate medications found. Please discuss with provider. NOTIFY YOUR PHYSICIAN FOR ANY OF THE FOLLOWING:   Fever over 101 degrees for 24 hours. Chest pain, shortness of breath, fever, chills, nausea, vomiting, diarrhea, change in mentation, falling, weakness, bleeding. Severe pain or pain not relieved by medications. Or, any other signs or symptoms that you may have questions about. DISPOSITION:    Home With:   OT  PT  HH  RN       Long term SNF/Inpatient Rehab   x Independent/assisted living    Hospice    Other:       PATIENT CONDITION AT DISCHARGE:     Functional status   x Poor     Deconditioned     Independent      Cognition     Lucid     Forgetful    x Dementia        Code status    x Full code     DNR      PHYSICAL EXAMINATION AT DISCHARGE:  I had a face to face encounter with this patient and independently examined them on 11/19/2021 as outlined below:                                                   Constitutional:  no distress   ENT:  Normal conjunctiva,anciteric sclera,EOMI,anicteric sclera   Resp:  CTA bilaterally. No wheezing/rhonchi/rales.  No accessory muscle use   CV:  regular rhythym, normal rate,S1, S2 wnl    GI:  Soft, non distended, non tender,bowel sounds +    Musculoskeletal:  No LE edema    Neurologic: Alert and oriented X 2,moves all extremities           CHRONIC MEDICAL DIAGNOSES:  Problem List as of 11/19/2021 Date Reviewed: 10/25/2021 Codes Class Noted - Resolved    Alcohol withdrawal ([facility] ICD-10-CM: R61.327  ICD-9-CM: 291.81  11/9/2021 - Present        COPD (chronic obstructive pulmonary disease) ([facility] ICD-10-CM: J44.9  ICD-9-CM: 496  11/9/2021 - Present        COPD exacerbation ([facility] ICD-10-CM: J44.1  ICD-9-CM: 491.21  11/9/2021 - Present        Alcohol abuse ICD-10-CM: F10.10  ICD-9-CM: 305.00  10/23/2021 - Present        Vomiting blood ICD-10-CM: K92.0  ICD-9-CM: 578.0  10/23/2021 - Present              Greater than 30 minutes were spent with the patient on counseling and coordination of care    Signed:   Km Mata MD  11/19/2021  10:25 AM

## 2021-11-20 ENCOUNTER — APPOINTMENT (OUTPATIENT)
Dept: CT IMAGING | Age: 71
End: 2021-11-20
Attending: EMERGENCY MEDICINE
Payer: MEDICARE

## 2021-11-20 ENCOUNTER — APPOINTMENT (OUTPATIENT)
Dept: GENERAL RADIOLOGY | Age: 71
End: 2021-11-20
Attending: EMERGENCY MEDICINE
Payer: MEDICARE

## 2021-11-20 ENCOUNTER — HOSPITAL ENCOUNTER (EMERGENCY)
Age: 71
Discharge: HOME | End: 2021-11-20
Attending: EMERGENCY MEDICINE
Payer: MEDICARE

## 2021-11-20 VITALS
WEIGHT: 169.09 LBS | TEMPERATURE: 98.1 F | RESPIRATION RATE: 21 BRPM | DIASTOLIC BLOOD PRESSURE: 73 MMHG | HEART RATE: 75 BPM | OXYGEN SATURATION: 100 % | HEIGHT: 67 IN | SYSTOLIC BLOOD PRESSURE: 101 MMHG | BODY MASS INDEX: 26.54 KG/M2
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
## 2021-11-20 DIAGNOSIS — E86.0 DEHYDRATION: ICD-10-CM

## 2021-11-20 DIAGNOSIS — E87.20 LACTIC ACIDOSIS: ICD-10-CM

## 2021-11-20 DIAGNOSIS — F10.920 ALCOHOLIC INTOXICATION WITHOUT COMPLICATION (HCC): Primary | ICD-10-CM

## 2021-11-20 LAB
ALBUMIN SERPL-MCNC: 2.7 G/DL (ref 3.5–5)
ALBUMIN/GLOB SERPL: 0.8 {RATIO} (ref 1.1–2.2)
ALP SERPL-CCNC: 93 U/L (ref 45–117)
ALT SERPL-CCNC: 45 U/L (ref 12–78)
AMMONIA PLAS-SCNC: <10 UMOL/L
ANION GAP SERPL CALC-SCNC: 12 MMOL/L (ref 5–15)
AST SERPL-CCNC: 25 U/L (ref 15–37)
ATRIAL RATE: 74 BPM
BASOPHILS # BLD: 0 K/UL (ref 0–0.1)
BASOPHILS NFR BLD: 0 % (ref 0–1)
BILIRUB SERPL-MCNC: 0.3 MG/DL (ref 0.2–1)
BNP SERPL-MCNC: 235 PG/ML (ref 0–125)
BUN SERPL-MCNC: 17 MG/DL (ref 6–20)
BUN/CREAT SERPL: 22 (ref 12–20)
CALCIUM SERPL-MCNC: 8.5 MG/DL (ref 8.5–10.1)
CALCULATED P AXIS, ECG09: 72 DEGREES
CALCULATED R AXIS, ECG10: 50 DEGREES
CALCULATED T AXIS, ECG11: 54 DEGREES
CHLORIDE SERPL-SCNC: 107 MMOL/L (ref 97–108)
CO2 SERPL-SCNC: 25 MMOL/L (ref 21–32)
CREAT SERPL-MCNC: 0.78 MG/DL (ref 0.7–1.3)
DIAGNOSIS, 93000: NORMAL
DIFFERENTIAL METHOD BLD: ABNORMAL
EOSINOPHIL # BLD: 0.1 K/UL (ref 0–0.4)
EOSINOPHIL NFR BLD: 1 % (ref 0–7)
ERYTHROCYTE [DISTWIDTH] IN BLOOD BY AUTOMATED COUNT: 14.8 % (ref 11.5–14.5)
ETHANOL SERPL-MCNC: 99 MG/DL
GLOBULIN SER CALC-MCNC: 3.5 G/DL (ref 2–4)
GLUCOSE SERPL-MCNC: 80 MG/DL (ref 65–100)
HCT VFR BLD AUTO: 31.5 % (ref 36.6–50.3)
HGB BLD-MCNC: 9.6 G/DL (ref 12.1–17)
IMM GRANULOCYTES # BLD AUTO: 0 K/UL (ref 0–0.04)
IMM GRANULOCYTES NFR BLD AUTO: 1 % (ref 0–0.5)
LACTATE SERPL-SCNC: 2.6 MMOL/L (ref 0.4–2)
LACTATE SERPL-SCNC: 3.7 MMOL/L (ref 0.4–2)
LIPASE SERPL-CCNC: 70 U/L (ref 73–393)
LYMPHOCYTES # BLD: 1.6 K/UL (ref 0.8–3.5)
LYMPHOCYTES NFR BLD: 26 % (ref 12–49)
MAGNESIUM SERPL-MCNC: 2.1 MG/DL (ref 1.6–2.4)
MCH RBC QN AUTO: 30.1 PG (ref 26–34)
MCHC RBC AUTO-ENTMCNC: 30.5 G/DL (ref 30–36.5)
MCV RBC AUTO: 98.7 FL (ref 80–99)
MONOCYTES # BLD: 0.4 K/UL (ref 0–1)
MONOCYTES NFR BLD: 7 % (ref 5–13)
NEUTS SEG # BLD: 4 K/UL (ref 1.8–8)
NEUTS SEG NFR BLD: 65 % (ref 32–75)
NRBC # BLD: 0 K/UL (ref 0–0.01)
NRBC BLD-RTO: 0 PER 100 WBC
P-R INTERVAL, ECG05: 134 MS
PLATELET # BLD AUTO: 248 K/UL (ref 150–400)
PMV BLD AUTO: 9.9 FL (ref 8.9–12.9)
POTASSIUM SERPL-SCNC: 3.6 MMOL/L (ref 3.5–5.1)
PROT SERPL-MCNC: 6.2 G/DL (ref 6.4–8.2)
Q-T INTERVAL, ECG07: 374 MS
QRS DURATION, ECG06: 82 MS
QTC CALCULATION (BEZET), ECG08: 415 MS
RBC # BLD AUTO: 3.19 M/UL (ref 4.1–5.7)
SODIUM SERPL-SCNC: 144 MMOL/L (ref 136–145)
TROPONIN-HIGH SENSITIVITY: 6 NG/L (ref 0–76)
VENTRICULAR RATE, ECG03: 74 BPM
WBC # BLD AUTO: 6.2 K/UL (ref 4.1–11.1)

## 2021-11-20 PROCEDURE — 85025 COMPLETE CBC W/AUTO DIFF WBC: CPT

## 2021-11-20 PROCEDURE — 93005 ELECTROCARDIOGRAM TRACING: CPT

## 2021-11-20 PROCEDURE — 74011250636 HC RX REV CODE- 250/636: Performed by: EMERGENCY MEDICINE

## 2021-11-20 PROCEDURE — 70450 CT HEAD/BRAIN W/O DYE: CPT

## 2021-11-20 PROCEDURE — 83735 ASSAY OF MAGNESIUM: CPT

## 2021-11-20 PROCEDURE — 83880 ASSAY OF NATRIURETIC PEPTIDE: CPT

## 2021-11-20 PROCEDURE — 82077 ASSAY SPEC XCP UR&BREATH IA: CPT

## 2021-11-20 PROCEDURE — 99285 EMERGENCY DEPT VISIT HI MDM: CPT

## 2021-11-20 PROCEDURE — 36415 COLL VENOUS BLD VENIPUNCTURE: CPT

## 2021-11-20 PROCEDURE — 82140 ASSAY OF AMMONIA: CPT

## 2021-11-20 PROCEDURE — 96360 HYDRATION IV INFUSION INIT: CPT

## 2021-11-20 PROCEDURE — 84484 ASSAY OF TROPONIN QUANT: CPT

## 2021-11-20 PROCEDURE — 83690 ASSAY OF LIPASE: CPT

## 2021-11-20 PROCEDURE — 80053 COMPREHEN METABOLIC PANEL: CPT

## 2021-11-20 PROCEDURE — 71045 X-RAY EXAM CHEST 1 VIEW: CPT

## 2021-11-20 PROCEDURE — 83605 ASSAY OF LACTIC ACID: CPT

## 2021-11-20 RX ADMIN — SODIUM CHLORIDE 1000 ML: 9 INJECTION, SOLUTION INTRAVENOUS at 10:03

## 2021-11-20 NOTE — ED PROVIDER NOTES
70 y.o. male with a history of COPD, EtOH abuse, discharged from [facility] yesterday with recommendations for rehab but patient declined as well as declining home health, presents to the emergency department by EMS reporting that he reportedly had a ground-level fall last night around 7 PM but denied any injury. States he is not sure if he lost consciousness or hit his head but denies any pain anywhere. Denies any other injury sustained from a fall. He reportedly is slightly slower to respond than he normally is per his wife. He denies any fever, chills, numbness, weakness, chest pain, nausea, vomiting, diarrhea. He states that he feels some slight shortness of breath stating that he has asthma this is usual for him. He is oriented to place (hospital), month, but believes that it is 2001. Uncertain if this is baseline for him. He is on no blood thinners. Past Medical History:   Diagnosis Date    Alcohol abuse     Chronic obstructive pulmonary disease ([facility]        No past surgical history on file. No family history on file.     Social History     Socioeconomic History    Marital status:      Spouse name: Not on file    Number of children: Not on file    Years of education: Not on file    Highest education level: Not on file   Occupational History    Not on file   Tobacco Use    Smoking status: Never Smoker    Smokeless tobacco: Never Used   Vaping Use    Vaping Use: Never used   Substance and Sexual Activity    Alcohol use: Yes     Comment: Daily drinker; 4oz     Drug use: Never    Sexual activity: Not on file   Other Topics Concern    Not on file   Social History Narrative    Not on file     Social Determinants of Health     Financial Resource Strain:     Difficulty of Paying Living Expenses: Not on file   Food Insecurity:     Worried About Running Out of Food in the Last Year: Not on file    Nhan of Food in the Last Year: Not on file   Transportation Needs:     Lack of Transportation (Medical): Not on file    Lack of Transportation (Non-Medical): Not on file   Physical Activity:     Days of Exercise per Week: Not on file    Minutes of Exercise per Session: Not on file   Stress:     Feeling of Stress : Not on file   Social Connections:     Frequency of Communication with Friends and Family: Not on file    Frequency of Social Gatherings with Friends and Family: Not on file    Attends Pent[employer]ism Services: Not on file    Active Member of [address] [place] [address] or Organizations: Not on file    Attends Club or Organization Meetings: Not on file    Marital Status: Not on file   Intimate Partner Violence:     Fear of Current or Ex-Partner: Not on file    Emotionally Abused: Not on file    Physically Abused: Not on file    Sexually Abused: Not on file   Housing Stability:     Unable to Pay for Housing in the Last Year: Not on file    Number of [place] in the Last Year: Not on file    Unstable Housing in the Last Year: Not on file         ALLERGIES: Patient has no known allergies. Review of Systems   Constitutional: Positive for activity change. Negative for appetite change, chills and fever. HENT: Negative for congestion, rhinorrhea, sinus pain, sneezing and sore throat. Eyes: Negative for photophobia and visual disturbance. Respiratory: Positive for shortness of breath (mild). Negative for cough. Cardiovascular: Negative for chest pain. Gastrointestinal: Negative for abdominal pain, blood in stool, constipation, diarrhea, nausea and vomiting. Genitourinary: Negative for difficulty urinating, dysuria, flank pain, hematuria, penile pain and testicular pain. Musculoskeletal: Negative for arthralgias, back pain, myalgias and neck pain. Skin: Negative for rash and wound. Neurological: Negative for syncope, weakness, light-headedness, numbness and headaches. Psychiatric/Behavioral: Positive for confusion. Negative for self-injury and suicidal ideas.    All other systems reviewed and are negative. Vitals:    11/20/21 0854   BP: 110/71   Pulse: 73   Resp: 22   SpO2: 98%   Weight: 76.7 kg (169 lb 1.5 oz)   Height: 5' 7\" (1.702 m)            Physical Exam  Vitals and nursing note reviewed. Constitutional:       General: He is not in acute distress. Appearance: Normal appearance. He is well-developed. He is not diaphoretic. Comments: Disheveled   HENT:      Head: Normocephalic and atraumatic. Nose: Nose normal.   Eyes:      Extraocular Movements: Extraocular movements intact. Conjunctiva/sclera: Conjunctivae normal.      Pupils: Pupils are equal, round, and reactive to light. Cardiovascular:      Rate and Rhythm: Normal rate and regular rhythm. Heart sounds: Normal heart sounds. Pulmonary:      Effort: Pulmonary effort is normal.      Breath sounds: Normal breath sounds. Abdominal:      General: There is no distension. Palpations: Abdomen is soft. Tenderness: There is no abdominal tenderness. Musculoskeletal:         General: No tenderness. Cervical back: Neck supple. Skin:     General: Skin is warm and dry. Neurological:      General: No focal deficit present. Mental Status: He is alert. Cranial Nerves: No cranial nerve deficit. Sensory: No sensory deficit. Motor: No weakness. Coordination: Coordination normal.      Comments: Oriented to person, place, and month but not to year. Otherwise neuro intact with 5/5 strength in all 4 extremities with intact sensation. Mild horizontal nystagmus noted            MDM   66-year-old male presents with GL F and reported mild altered mental status from his baseline reportedly \"slower to respond than he normally is. \"  Afebrile with vital signs stable. Labs returned showing no leukocytosis, Hg 9.6, normal bicarb and LFTs, but elevated lactate at 3.7 and elevated alcohol at 99. Feel that EtOH abuse is likely the etiology for his fall and forgetfulness.   Labs otherwise reassuring. CXR viewed by myself and read by radiology showing no acute abnormalities. CT head shows no acute intracranial abnormalities. He was given IV fluid bolus and repeat lactic acid shows improvement down to 2.6. He was recommended to drink more water today and was recommended alcohol cessation. He was provided with alcohol treatment resources in the area and was recommended to follow-up with them as an outpatient. Recommended PCP follow-up as needed and return precautions were given for worsening or concerns. This plan was discussed with the patient at the bedside and he stated both understanding and agreement stated that he feels better and would like to go home. Procedures  9:10 AM EKG shows normal sinus rhythm with a rate of 74 bpm with nonspecific T wave flattening but no ST elevation or depression.

## 2021-11-20 NOTE — ED TRIAGE NOTES
Pt. Asha Marin by ems for slow to respond and he complains of slight shortness of breath. Pt. Denies any LOC or PAIN. Pt. Valentino Huh last night without injury.

## 2021-11-20 NOTE — ED NOTES
Pt ambulatory out of ED with discharge instructions and prescriptions in hand given by Dr. Sathya Robbins; pt verbalized understanding of discharge paperwork and time allotted for questions. VSS. Pt alert and oriented. Pt to be picked up from waiting room by wife.

## 2021-12-01 ENCOUNTER — HOSPITAL ENCOUNTER (EMERGENCY)
Age: 71
Discharge: HOME | End: 2021-12-01
Attending: STUDENT IN AN ORGANIZED HEALTH CARE EDUCATION/TRAINING PROGRAM
Payer: MEDICARE

## 2021-12-01 ENCOUNTER — APPOINTMENT (OUTPATIENT)
Dept: CT IMAGING | Age: 71
End: 2021-12-01
Attending: STUDENT IN AN ORGANIZED HEALTH CARE EDUCATION/TRAINING PROGRAM
Payer: MEDICARE

## 2021-12-01 VITALS
HEART RATE: 97 BPM | TEMPERATURE: 98 F | RESPIRATION RATE: 16 BRPM | OXYGEN SATURATION: 94 % | BODY MASS INDEX: 25.93 KG/M2 | WEIGHT: 165.57 LBS | DIASTOLIC BLOOD PRESSURE: 63 MMHG | SYSTOLIC BLOOD PRESSURE: 100 MMHG

## 2021-12-01 DIAGNOSIS — F10.20 ALCOHOL USE DISORDER, MODERATE, DEPENDENCE (HCC): ICD-10-CM

## 2021-12-01 DIAGNOSIS — F32.89 OTHER DEPRESSION: Primary | ICD-10-CM

## 2021-12-01 LAB
ALBUMIN SERPL-MCNC: 2.6 G/DL (ref 3.5–5)
ALBUMIN/GLOB SERPL: 0.7 {RATIO} (ref 1.1–2.2)
ALP SERPL-CCNC: 121 U/L (ref 45–117)
ALT SERPL-CCNC: 28 U/L (ref 12–78)
AMPHET UR QL SCN: NEGATIVE
ANION GAP SERPL CALC-SCNC: 10 MMOL/L (ref 5–15)
APAP SERPL-MCNC: <2 UG/ML (ref 10–30)
APPEARANCE UR: CLEAR
AST SERPL-CCNC: 26 U/L (ref 15–37)
BACTERIA URNS QL MICRO: NEGATIVE /HPF
BARBITURATES UR QL SCN: NEGATIVE
BASOPHILS # BLD: 0 K/UL (ref 0–0.1)
BASOPHILS NFR BLD: 1 % (ref 0–1)
BENZODIAZ UR QL: POSITIVE
BILIRUB SERPL-MCNC: 0.3 MG/DL (ref 0.2–1)
BILIRUB UR QL: NEGATIVE
BUN SERPL-MCNC: 7 MG/DL (ref 6–20)
BUN/CREAT SERPL: 9 (ref 12–20)
CALCIUM SERPL-MCNC: 8.6 MG/DL (ref 8.5–10.1)
CANNABINOIDS UR QL SCN: NEGATIVE
CHLORIDE SERPL-SCNC: 106 MMOL/L (ref 97–108)
CO2 SERPL-SCNC: 28 MMOL/L (ref 21–32)
COCAINE UR QL SCN: NEGATIVE
COLOR UR: ABNORMAL
CREAT SERPL-MCNC: 0.75 MG/DL (ref 0.7–1.3)
DIFFERENTIAL METHOD BLD: ABNORMAL
DRUG SCRN COMMENT,DRGCM: ABNORMAL
EOSINOPHIL # BLD: 0.3 K/UL (ref 0–0.4)
EOSINOPHIL NFR BLD: 8 % (ref 0–7)
EPITH CASTS URNS QL MICRO: ABNORMAL /LPF
ERYTHROCYTE [DISTWIDTH] IN BLOOD BY AUTOMATED COUNT: 14.6 % (ref 11.5–14.5)
ETHANOL SERPL-MCNC: 239 MG/DL
GLOBULIN SER CALC-MCNC: 4 G/DL (ref 2–4)
GLUCOSE SERPL-MCNC: 129 MG/DL (ref 65–100)
GLUCOSE UR STRIP.AUTO-MCNC: NEGATIVE MG/DL
HCT VFR BLD AUTO: 34.4 % (ref 36.6–50.3)
HGB BLD-MCNC: 10.4 G/DL (ref 12.1–17)
HGB UR QL STRIP: ABNORMAL
IMM GRANULOCYTES # BLD AUTO: 0 K/UL (ref 0–0.04)
IMM GRANULOCYTES NFR BLD AUTO: 0 % (ref 0–0.5)
KETONES UR QL STRIP.AUTO: NEGATIVE MG/DL
LEUKOCYTE ESTERASE UR QL STRIP.AUTO: NEGATIVE
LYMPHOCYTES # BLD: 1.7 K/UL (ref 0.8–3.5)
LYMPHOCYTES NFR BLD: 41 % (ref 12–49)
MCH RBC QN AUTO: 28.4 PG (ref 26–34)
MCHC RBC AUTO-ENTMCNC: 30.2 G/DL (ref 30–36.5)
MCV RBC AUTO: 94 FL (ref 80–99)
METHADONE UR QL: NEGATIVE
MONOCYTES # BLD: 0.3 K/UL (ref 0–1)
MONOCYTES NFR BLD: 8 % (ref 5–13)
NEUTS SEG # BLD: 1.8 K/UL (ref 1.8–8)
NEUTS SEG NFR BLD: 43 % (ref 32–75)
NITRITE UR QL STRIP.AUTO: NEGATIVE
NRBC # BLD: 0 K/UL (ref 0–0.01)
NRBC BLD-RTO: 0 PER 100 WBC
OPIATES UR QL: NEGATIVE
PCP UR QL: NEGATIVE
PH UR STRIP: 5 [PH] (ref 5–8)
PLATELET # BLD AUTO: 369 K/UL (ref 150–400)
PMV BLD AUTO: 8.7 FL (ref 8.9–12.9)
POTASSIUM SERPL-SCNC: 3.7 MMOL/L (ref 3.5–5.1)
PROT SERPL-MCNC: 6.6 G/DL (ref 6.4–8.2)
PROT UR STRIP-MCNC: NEGATIVE MG/DL
RBC # BLD AUTO: 3.66 M/UL (ref 4.1–5.7)
RBC #/AREA URNS HPF: ABNORMAL /HPF (ref 0–5)
SALICYLATES SERPL-MCNC: <1.7 MG/DL (ref 2.8–20)
SODIUM SERPL-SCNC: 144 MMOL/L (ref 136–145)
SP GR UR REFRACTOMETRY: 1.01 (ref 1–1.03)
UROBILINOGEN UR QL STRIP.AUTO: 0.2 EU/DL (ref 0.2–1)
WBC # BLD AUTO: 4.2 K/UL (ref 4.1–11.1)
WBC URNS QL MICRO: ABNORMAL /HPF (ref 0–4)

## 2021-12-01 PROCEDURE — 36415 COLL VENOUS BLD VENIPUNCTURE: CPT

## 2021-12-01 PROCEDURE — 70450 CT HEAD/BRAIN W/O DYE: CPT

## 2021-12-01 PROCEDURE — 99285 EMERGENCY DEPT VISIT HI MDM: CPT

## 2021-12-01 PROCEDURE — 80307 DRUG TEST PRSMV CHEM ANLYZR: CPT

## 2021-12-01 PROCEDURE — 80053 COMPREHEN METABOLIC PANEL: CPT

## 2021-12-01 PROCEDURE — 81001 URINALYSIS AUTO W/SCOPE: CPT

## 2021-12-01 PROCEDURE — 80179 DRUG ASSAY SALICYLATE: CPT

## 2021-12-01 PROCEDURE — 82077 ASSAY SPEC XCP UR&BREATH IA: CPT

## 2021-12-01 PROCEDURE — 85025 COMPLETE CBC W/AUTO DIFF WBC: CPT

## 2021-12-01 PROCEDURE — 80143 DRUG ASSAY ACETAMINOPHEN: CPT

## 2021-12-01 NOTE — ED TRIAGE NOTES
Pt arrive with cheif complaint of depression and pt stating \"I just want to die, just let me die\" to EMS staff. Pt is at baseline mentation and lives at home with his wife. Pt reports having 6 guns in his home. Pt has a hx of etoh abuse and last drink was reported 2 days ago.

## 2021-12-01 NOTE — BSMART NOTE
Comprehensive Assessment Form Part 1    Section I - Disposition  Alcohol Use Disorder      The Medical Doctor to Psychiatrist conference was not completed. The Medical Doctor is in agreement with Psychiatrist disposition because of (reason) Patient is pending medical clearance. The plan is medically clear and disposition. The on-call Psychiatrist consulted was Dr. Apolinar Burk. The admitting Psychiatrist will be Dr. Apolinar Burk. The admitting Diagnosis is NA. The Payor source is Medicare and . [place] [address] Suicide Severity Level is MILD. The plan will be to discharge patient. WIfe will provide supervision and agreed to lock up firearms. Follow up resources provided for SA treatment. Section II - Integrated Summary  Summary:  Patient came in by squad called by wife. Patient was reporting depression and passive suicidal ideation upon arrival.  Patient does report there are guns in the home. Patient reported he was in ER because he fell out of bed and hit his head. This writer inquired about the suicidal ideation and patient stated \"everyone wants to die! \"  Patient then stated \"No, I don't want to die. You have asked me that 12 times! \"    Patient denied prior mental health and substance abuse treatment. He reported he is in the process of getting into an SA program but doesn't know the details because his wife is working on it. This writer asked if he wanted to stop drinking and he responded no and then he later responded yes. Patient is alert and oriented. Speech is linear and logical.  At times, when frustrated, patient yells his response. Patient presents as irritable during assessment. He reported that overall his mood is \"very well\" at home. Patient stated \"I have 4 puppy dogs and a bitchy wife. \"   Patient denied sleep and appetite disturbances. Patient further denied hallucinations and there is no evidence of any psychosis.   Patient denied any current suicidal ideation, denied plan, denied intent, and denied history of attempts. Patient denied any homicidal thoughts or history of violence. Patient reported he has been drinking \"all of my life. \"      Patient gave verbal permission to speak with his wife, Bryant Alva, and this Aviva Parker has called her at [phone] [phone] and voicemail box is full. WIll continue to try to gain collateral information and safety plan with patient's wife. Spoke with wife, Bryant Alva who reported he has been weak, having difficulty breathing, and liver disease. Patient has verbalized he wants to die on several occasions but not history of attempts. Patient reportedly has asked wife for his gun but she has it hidden. Per wife, there are two firearms in home and she will lock them up. She reported he has not been in SA treatment and has not been amenable. Wife indicated concern about him coming home due to his physical stability and his need for SA treatment. Discussed wife's concern regarding his physical state with Dr Ramiro Roach and he will speak to her. Also discussed plan for discharge and referrals for SA treatment. Referred to [facility], [facility], [facility]. Also provided [facility] Crisis number. The patient is deemed competent to provide informed consent. The information is given by the patient and past medical records. The Chief Complaint is suicidal ideation and fall. The Precipitant Factors are SA. Previous Hospitalizations: NA  The patient has not previously been in restraints. Current Psychiatrist and/or  is NA. Lethality Assessment:    The potential for suicide is noted by the following: noted by the following;  current substance abuse. The potential for homicide is not noted. The patient has not been a perpetrator of sexual or physical abuse. There are not pending charges. The patient is not felt to be at risk for self harm or harm to others.   The attending nurse was advised that security has not been notified. Section III - Psychosocial  The patient's overall mood and attitude is irritable. Feelings of helplessness and hopelessness are not observed. Generalized anxiety is not observed. Panic is not observed. Phobias are not observed. Obsessive compulsive tendencies are not observed. Section IV - Mental Status Exam  The patient's appearance shows no evidence of impairment. The patient's behavior is restless. The patient is oriented. The patient's speech shows no evidence of impairment. The patient's mood  is irritable. The range of affect is constricted. The patient's thought content  demonstrates no evidence of impairment. The thought process shows no evidence of impairment. The patient's perception shows no evidence of impairment. The patient's memory shows no evidence of impairment. The patient's appetite shows no evidence of impairment. The patient's sleep shows no evidence of impairment. The patient shows no insight. The patient's judgement is SA/psychologically impaired. Section V - Substance Abuse  The patient is using substances. The patient is using alcohol for greater than 10 years with last use on last night. The patient has experienced the following withdrawal symptoms,  He denied. Section VI - Living Arrangements  The patient is . The patient lives with a spouse. The patient has no children. The patient does plan to return home upon discharge. The patient does not have legal issues pending. The patient's source of income comes from social security. Advent and cultural practices have not been voiced at this time. The patient's greatest support comes from wife and this person will be involved with the treatment. The patient has not been in an event described as horrible or outside the realm of ordinary life experience either currently or in the past.  The patient has not been a victim of sexual/physical abuse.     Section VII - Other Areas of Clinical Concern  The highest grade achieved is NA with the overall quality of school experience being described as NA. The patient is currently  unemployed and speaks [place] as a primary language. The patient has no communication impairments affecting communication. The patient's preference for learning can be described as: can read and write adequately.   The patient's hearing is normal.  The patient's vision is normal .      Jazlyn Monzon, RAMON

## 2021-12-01 NOTE — ED PROVIDER NOTES
22-year-old gentleman with history of alcohol abuse, COPD, GI bleeding in the past, PE per chart presenting after EMS was called to the home, he endorses depressed mood, thoughts of harming himself but no plan or attempts in the past. He states that he also rolled out of bed today inadvertently and struck his head. Denies headache or vomiting. No abdominal pain. Denies alcohol use today. No hematemesis or melena. Past Medical History:   Diagnosis Date    Alcohol abuse     Chronic obstructive pulmonary disease (HCC)     GI bleeding     Thromboembolus (HCC)     PE       Past Surgical History:   Procedure Laterality Date    HX APPENDECTOMY      HX CHOLECYSTECTOMY      HX ENDOSCOPY      HX GI      hernia         History reviewed. No pertinent family history. Social History     Socioeconomic History    Marital status:      Spouse name: Not on file    Number of children: Not on file    Years of education: Not on file    Highest education level: Not on file   Occupational History    Not on file   Tobacco Use    Smoking status: Never Smoker    Smokeless tobacco: Never Used   Vaping Use    Vaping Use: Never used   Substance and Sexual Activity    Alcohol use: Yes     Comment: Daily drinker; 4oz     Drug use: Never    Sexual activity: Not on file   Other Topics Concern    Not on file   Social History Narrative    Not on file     Social Determinants of Health     Financial Resource Strain:     Difficulty of Paying Living Expenses: Not on file   Food Insecurity:     Worried About Running Out of Food in the Last Year: Not on file    Nhan of Food in the Last Year: Not on file   Transportation Needs:     Lack of Transportation (Medical): Not on file    Lack of Transportation (Non-Medical):  Not on file   Physical Activity:     Days of Exercise per Week: Not on file    Minutes of Exercise per Session: Not on file   Stress:     Feeling of Stress : Not on file   Social Connections:  Frequency of Communication with Friends and Family: Not on file    Frequency of Social Gatherings with Friends and Family: Not on file    Attends Hindu Services: Not on file    Active Member of Clubs or Organizations: Not on file    Attends Club or Organization Meetings: Not on file    Marital Status: Not on file   Intimate Partner Violence:     Fear of Current or Ex-Partner: Not on file    Emotionally Abused: Not on file    Physically Abused: Not on file    Sexually Abused: Not on file   Housing Stability:     Unable to Pay for Housing in the Last Year: Not on file    Number of [place] in the Last Year: Not on file    Unstable Housing in the Last Year: Not on file         ALLERGIES: Patient has no known allergies. Review of Systems   Constitutional: Negative for chills, fatigue and fever. HENT: Negative for ear pain, sore throat and trouble swallowing. Eyes: Negative for visual disturbance. Respiratory: Negative for cough and shortness of breath. Cardiovascular: Negative for chest pain. Gastrointestinal: Negative for abdominal pain. Genitourinary: Negative for dysuria. Musculoskeletal: Negative for back pain. Skin: Negative for rash. Neurological: Positive for dizziness. Negative for light-headedness and headaches. Psychiatric/Behavioral: Negative for confusion. All other systems reviewed and are negative. Vitals:    12/01/21 0809 12/01/21 0939 12/01/21 0954 12/01/21 1109   BP: 113/80 107/81 94/68 100/63   Pulse: 91   97   Resp: 16   16   Temp: 98 °F (36.7 °C)   98 °F (36.7 °C)   SpO2: 99% 99% 99% 94%   Weight: 75.1 kg (165 lb 9.1 oz)               Physical Exam  Constitutional:       General: He is not in acute distress. Appearance: He is not toxic-appearing. HENT:      Head: Normocephalic and atraumatic. Mouth/Throat:      Mouth: Mucous membranes are moist.   Eyes:      Extraocular Movements: Extraocular movements intact.    Cardiovascular: Rate and Rhythm: Normal rate and regular rhythm. Heart sounds: Normal heart sounds. Pulmonary:      Effort: Pulmonary effort is normal. No respiratory distress. Breath sounds: Normal breath sounds. Abdominal:      Palpations: Abdomen is soft. Tenderness: There is no abdominal tenderness. Musculoskeletal:      Cervical back: Normal range of motion. Right lower leg: No edema. Left lower leg: No edema. Skin:     Capillary Refill: Capillary refill takes less than 2 seconds. Neurological:      General: No focal deficit present. Mental Status: He is alert and oriented to person, place, and time. Psychiatric:         Mood and Affect: Mood is depressed. Thought Content: Thought content includes suicidal ideation. Thought content does not include suicidal plan. Cognition and Memory: Cognition normal.          MDM     MEDICAL DECISION MAKIN y.o. male presents with Mental Health Problem    Differential diagnosis includes but not limited to: Depression, concern for risk for suicide-we'll obtain [facility] counseloe eval/consult     LABORATORY TESTS:  Labs Reviewed   CBC WITH AUTOMATED DIFF - Abnormal; Notable for the following components:       Result Value    RBC 3.66 (*)     HGB 10.4 (*)     HCT 34.4 (*)     RDW 14.6 (*)     MPV 8.7 (*)     EOSINOPHILS 8 (*)     All other components within normal limits   METABOLIC PANEL, COMPREHENSIVE - Abnormal; Notable for the following components:    Glucose 129 (*)     BUN/Creatinine ratio 9 (*)     Alk.  phosphatase 121 (*)     Albumin 2.6 (*)     A-G Ratio 0.7 (*)     All other components within normal limits   ACETAMINOPHEN - Abnormal; Notable for the following components:    Acetaminophen level <2 (*)     All other components within normal limits   SALICYLATE - Abnormal; Notable for the following components:    Salicylate level <3.2 (*)     All other components within normal limits   DRUG SCREEN, URINE - Abnormal; Notable for the following components:    BENZODIAZEPINES Positive (*)     All other components within normal limits   ETHYL ALCOHOL - Abnormal; Notable for the following components:    ALCOHOL(ETHYL),SERUM 239 (*)     All other components within normal limits   URINALYSIS W/MICROSCOPIC - Abnormal; Notable for the following components:    Blood TRACE (*)     All other components within normal limits       IMAGING RESULTS:  CT HEAD WO CONT   Final Result   No acute intracranial abnormality. MEDICATIONS GIVEN:  Medications - No data to display    PROGRESS NOTE:   11 AM feels well, denies SI or other complaints. CONSULTS:  [facility] -- d/w counselor, ok for dc    IMPRESSION:  1. Other depression    2. Alcohol use disorder, moderate, dependence ([facility] [address]        PLAN:  -   Discharge  Discharge Medication List as of 12/1/2021 11:14 AM        Follow-up Information     Follow up With Specialties Details Why Efrain Maurer MD Family Medicine Schedule an appointment as soon as possible for a visit  Call for a follow up appointment. [facility]  [phone]          Return precautions given    [facility] Counsellor eval -- low risk for suicide/homicide, does not need hospitalization. He is clinically sober despite elevated alcohol level, likely due to frequent use. Orlin Hoang MD          Please note that this dictation was completed with [employer], the computer voice recognition software. Quite often unanticipated grammatical, syntax, homophones, and other interpretive errors are inadvertently transcribed by the computer software. Please disregard these errors. Please excuse any errors that have escaped final proofreading.

## 2021-12-03 ENCOUNTER — APPOINTMENT (OUTPATIENT)
Dept: CT IMAGING | Age: 71
End: 2021-12-03
Attending: EMERGENCY MEDICINE
Payer: MEDICARE

## 2021-12-03 ENCOUNTER — HOSPITAL ENCOUNTER (EMERGENCY)
Age: 71
Discharge: HOME | End: 2021-12-03
Attending: EMERGENCY MEDICINE
Payer: MEDICARE

## 2021-12-03 ENCOUNTER — APPOINTMENT (OUTPATIENT)
Dept: GENERAL RADIOLOGY | Age: 71
End: 2021-12-03
Attending: EMERGENCY MEDICINE
Payer: MEDICARE

## 2021-12-03 VITALS
RESPIRATION RATE: 20 BRPM | OXYGEN SATURATION: 99 % | SYSTOLIC BLOOD PRESSURE: 126 MMHG | TEMPERATURE: 98.6 F | HEART RATE: 96 BPM | DIASTOLIC BLOOD PRESSURE: 82 MMHG

## 2021-12-03 DIAGNOSIS — R06.02 SHORTNESS OF BREATH: ICD-10-CM
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
## 2021-12-03 DIAGNOSIS — R07.9 ACUTE CHEST PAIN: ICD-10-CM

## 2021-12-03 DIAGNOSIS — F10.920 ALCOHOLIC INTOXICATION WITHOUT COMPLICATION (HCC): Primary | ICD-10-CM

## 2021-12-03 DIAGNOSIS — S09.90XA MINOR HEAD INJURY, INITIAL ENCOUNTER: ICD-10-CM

## 2021-12-03 LAB
ALBUMIN SERPL-MCNC: 3 G/DL (ref 3.5–5)
ALBUMIN/GLOB SERPL: 0.7 {RATIO} (ref 1.1–2.2)
ALP SERPL-CCNC: 124 U/L (ref 45–117)
ALT SERPL-CCNC: 33 U/L (ref 12–78)
ANION GAP SERPL CALC-SCNC: 14 MMOL/L (ref 5–15)
AST SERPL-CCNC: 31 U/L (ref 15–37)
ATRIAL RATE: 114 BPM
BASOPHILS # BLD: 0 K/UL (ref 0–0.1)
BASOPHILS NFR BLD: 1 % (ref 0–1)
BILIRUB SERPL-MCNC: 0.3 MG/DL (ref 0.2–1)
BUN SERPL-MCNC: 8 MG/DL (ref 6–20)
BUN/CREAT SERPL: 9 (ref 12–20)
CALCIUM SERPL-MCNC: 8.7 MG/DL (ref 8.5–10.1)
CALCULATED P AXIS, ECG09: 50 DEGREES
CALCULATED R AXIS, ECG10: 83 DEGREES
CALCULATED T AXIS, ECG11: 51 DEGREES
CHLORIDE SERPL-SCNC: 103 MMOL/L (ref 97–108)
CO2 SERPL-SCNC: 24 MMOL/L (ref 21–32)
CREAT SERPL-MCNC: 0.86 MG/DL (ref 0.7–1.3)
DIAGNOSIS, 93000: NORMAL
DIFFERENTIAL METHOD BLD: ABNORMAL
EOSINOPHIL # BLD: 0.1 K/UL (ref 0–0.4)
EOSINOPHIL NFR BLD: 1 % (ref 0–7)
ERYTHROCYTE [DISTWIDTH] IN BLOOD BY AUTOMATED COUNT: 15.1 % (ref 11.5–14.5)
ETHANOL SERPL-MCNC: 118 MG/DL
GLOBULIN SER CALC-MCNC: 4.1 G/DL (ref 2–4)
GLUCOSE SERPL-MCNC: 117 MG/DL (ref 65–100)
HCT VFR BLD AUTO: 35.7 % (ref 36.6–50.3)
HGB BLD-MCNC: 10.2 G/DL (ref 12.1–17)
IMM GRANULOCYTES # BLD AUTO: 0 K/UL (ref 0–0.04)
IMM GRANULOCYTES NFR BLD AUTO: 0 % (ref 0–0.5)
LYMPHOCYTES # BLD: 1.2 K/UL (ref 0.8–3.5)
LYMPHOCYTES NFR BLD: 22 % (ref 12–49)
MCH RBC QN AUTO: 28.2 PG (ref 26–34)
MCHC RBC AUTO-ENTMCNC: 28.6 G/DL (ref 30–36.5)
MCV RBC AUTO: 98.6 FL (ref 80–99)
MONOCYTES # BLD: 0.4 K/UL (ref 0–1)
MONOCYTES NFR BLD: 6 % (ref 5–13)
NEUTS SEG # BLD: 3.9 K/UL (ref 1.8–8)
NEUTS SEG NFR BLD: 70 % (ref 32–75)
NRBC # BLD: 0 K/UL (ref 0–0.01)
NRBC BLD-RTO: 0 PER 100 WBC
P-R INTERVAL, ECG05: 142 MS
PLATELET # BLD AUTO: 299 K/UL (ref 150–400)
PMV BLD AUTO: 9.2 FL (ref 8.9–12.9)
POTASSIUM SERPL-SCNC: 3.9 MMOL/L (ref 3.5–5.1)
PROT SERPL-MCNC: 7.1 G/DL (ref 6.4–8.2)
Q-T INTERVAL, ECG07: 342 MS
QRS DURATION, ECG06: 88 MS
QTC CALCULATION (BEZET), ECG08: 471 MS
RBC # BLD AUTO: 3.62 M/UL (ref 4.1–5.7)
SODIUM SERPL-SCNC: 141 MMOL/L (ref 136–145)
TROPONIN-HIGH SENSITIVITY: 7 NG/L (ref 0–76)
TROPONIN-HIGH SENSITIVITY: 8 NG/L (ref 0–76)
VENTRICULAR RATE, ECG03: 114 BPM
WBC # BLD AUTO: 5.6 K/UL (ref 4.1–11.1)

## 2021-12-03 PROCEDURE — 36415 COLL VENOUS BLD VENIPUNCTURE: CPT

## 2021-12-03 PROCEDURE — 85025 COMPLETE CBC W/AUTO DIFF WBC: CPT

## 2021-12-03 PROCEDURE — 74011250637 HC RX REV CODE- 250/637: Performed by: EMERGENCY MEDICINE

## 2021-12-03 PROCEDURE — 70450 CT HEAD/BRAIN W/O DYE: CPT

## 2021-12-03 PROCEDURE — 99285 EMERGENCY DEPT VISIT HI MDM: CPT

## 2021-12-03 PROCEDURE — 71275 CT ANGIOGRAPHY CHEST: CPT

## 2021-12-03 PROCEDURE — 93005 ELECTROCARDIOGRAM TRACING: CPT

## 2021-12-03 PROCEDURE — 80053 COMPREHEN METABOLIC PANEL: CPT

## 2021-12-03 PROCEDURE — 74011000636 HC RX REV CODE- 636: Performed by: EMERGENCY MEDICINE

## 2021-12-03 PROCEDURE — 84484 ASSAY OF TROPONIN QUANT: CPT

## 2021-12-03 PROCEDURE — 71045 X-RAY EXAM CHEST 1 VIEW: CPT

## 2021-12-03 PROCEDURE — 82077 ASSAY SPEC XCP UR&BREATH IA: CPT

## 2021-12-03 RX ORDER — ONDANSETRON 4 MG/1
4 TABLET, ORALLY DISINTEGRATING ORAL
Status: COMPLETED | OUTPATIENT
Start: 2021-12-03 | End: 2021-12-03

## 2021-12-03 RX ADMIN — ONDANSETRON 4 MG: 4 TABLET, ORALLY DISINTEGRATING ORAL at 20:35

## 2021-12-03 RX ADMIN — IOPAMIDOL 100 ML: 755 INJECTION, SOLUTION INTRAVENOUS at 17:53

## 2021-12-03 NOTE — ED PROVIDER NOTES
HPI     Please note that this dictation was completed with [employer], the computer voice recognition software. Quite often unanticipated grammatical, syntax, homophones, and other interpretive errors are inadvertently transcribed by the computer software. Please disregard these errors. Please excuse any errors that have escaped final proofreading. 66-year-old male with a history of COPD, thromboembolus PE, alcohol abuse here status post fall as well as complaints of chest pain or shortness of breath. He complains of chest pain and shortness of breath since this morning. He cannot be very descriptive about the chest pain. He has a history of PE in the past and not currently anticoagulated. Patient states he last drank alcohol 1 week ago. He states that he rolled over in bed and fell out of it causing superficial laceration above his left eyebrow. Denies loss of consciousness, vomiting, severe headache. Denies neck pain. EMS reports that they have been to the house 9 times in 1 week. Denies fevers, chills, cough or other complaints. Social history: History of alcohol abuse with last drink 1 week ago. Non-smoker. Past Medical History:   Diagnosis Date    Alcohol abuse     Chronic obstructive pulmonary disease (HCC)     GI bleeding     Thromboembolus (HCC)     PE       Past Surgical History:   Procedure Laterality Date    HX APPENDECTOMY      HX CHOLECYSTECTOMY      HX ENDOSCOPY      HX GI      hernia         No family history on file.     Social History     Socioeconomic History    Marital status:      Spouse name: Not on file    Number of children: Not on file    Years of education: Not on file    Highest education level: Not on file   Occupational History    Not on file   Tobacco Use    Smoking status: Never Smoker    Smokeless tobacco: Never Used   Vaping Use    Vaping Use: Never used   Substance and Sexual Activity    Alcohol use: Yes     Comment: Daily drinker; 4oz     Drug use: Never    Sexual activity: Not on file   Other Topics Concern    Not on file   Social History Narrative    Not on file     Social Determinants of Health     Financial Resource Strain:     Difficulty of Paying Living Expenses: Not on file   Food Insecurity:     Worried About Running Out of Food in the Last Year: Not on file    Nhan of Food in the Last Year: Not on file   Transportation Needs:     Lack of Transportation (Medical): Not on file    Lack of Transportation (Non-Medical): Not on file   Physical Activity:     Days of Exercise per Week: Not on file    Minutes of Exercise per Session: Not on file   Stress:     Feeling of Stress : Not on file   Social Connections:     Frequency of Communication with Friends and Family: Not on file    Frequency of Social Gatherings with Friends and Family: Not on file    Attends Confucianist Services: Not on file    Active Member of [address] [place] [address] [employer] or Organizations: Not on file    Attends Club or Organization Meetings: Not on file    Marital Status: Not on file   Intimate Partner Violence:     Fear of Current or Ex-Partner: Not on file    Emotionally Abused: Not on file    Physically Abused: Not on file    Sexually Abused: Not on file   Housing Stability:     Unable to Pay for Housing in the Last Year: Not on file    Number of [place] in the Last Year: Not on file    Unstable Housing in the Last Year: Not on file         ALLERGIES: Patient has no known allergies. Review of Systems   Constitutional: Negative for chills and fever. Respiratory: Positive for chest tightness and shortness of breath. Cardiovascular: Positive for chest pain. Gastrointestinal: Negative for diarrhea, nausea and vomiting. Neurological: Positive for headaches. All other systems reviewed and are negative.       Vitals:    12/03/21 1705 12/03/21 1720 12/03/21 1722   BP: 115/84 125/86 128/86   Pulse: 96 96 (!) 101   Resp: 16 16 14   Temp:  98.6 °F (37 °C)    SpO2: 100% 100% 99%            Physical Exam  Vitals and nursing note reviewed. Constitutional:       Appearance: He is well-developed. HENT:      Head: Normocephalic. Nose: No congestion. Mouth/Throat:      Mouth: Mucous membranes are moist.      Pharynx: No oropharyngeal exudate. Eyes:      General: No scleral icterus. Right eye: No discharge. Left eye: No discharge. Conjunctiva/sclera: Conjunctivae normal.      Pupils: Pupils are equal, round, and reactive to light. Cardiovascular:      Rate and Rhythm: Normal rate and regular rhythm. Heart sounds: Normal heart sounds. No murmur heard. No friction rub. No gallop. Pulmonary:      Effort: Pulmonary effort is normal. No respiratory distress. Breath sounds: Normal breath sounds. No wheezing or rales. Abdominal:      General: Bowel sounds are normal. There is no distension. Palpations: Abdomen is soft. Tenderness: There is no abdominal tenderness. There is no guarding. Musculoskeletal:         General: No tenderness. Normal range of motion. Cervical back: Normal range of motion and neck supple. Lymphadenopathy:      Cervical: No cervical adenopathy. Skin:     General: Skin is warm and dry. Coloration: Skin is not pale. Findings: No rash. Neurological:      Mental Status: He is alert and oriented to person, place, and time. Cranial Nerves: No cranial nerve deficit. Coordination: Coordination normal.          MDM     51-year-old male here status post fall with blunt head trauma as well as complaints of chest pain or shortness of breath. History of PE in past.  Will check head CT, CTA chest, labs. Procedures      ED EKG interpretation:  Rhythm: normal sinus rhythm; and regular . Rate (approx.): 114; Axis: normal; P wave: normal; QRS interval: normal ; ST/T wave: nonspecific changes. This EKG was interpreted by Johnathan Collins MD,ED Provider.     7:14 PM  D/w Maria Ines Olsen of case management. She will forward the patient info on CM team for recurrent visits. Jewel Guadarrama MD       7:36 PM  + ETOH contrary to pt stating that he ceased etoh. Troponin < 20% change - so unlikely MI. Patient's results have been reviewed with them. Patient and/or family have verbally conveyed their understanding and agreement of the patient's signs, symptoms, diagnosis, treatment and prognosis and additionally agree to follow up as recommended or return to the Emergency Room should their condition change prior to follow-up. Discharge instructions have also been provided to the patient with some educational information regarding their diagnosis as well a list of reasons why they would want to return to the ER prior to their follow-up appointment should their condition change. Recent Results (from the past 24 hour(s))   CBC WITH AUTOMATED DIFF    Collection Time: 12/03/21  5:07 PM   Result Value Ref Range    WBC 5.6 4.1 - 11.1 K/uL    RBC 3.62 (L) 4.10 - 5.70 M/uL    HGB 10.2 (L) 12.1 - 17.0 g/dL    HCT 35.7 (L) 36.6 - 50.3 %    MCV 98.6 80.0 - 99.0 FL    MCH 28.2 26.0 - 34.0 PG    MCHC 28.6 (L) 30.0 - 36.5 g/dL    RDW 15.1 (H) 11.5 - 14.5 %    PLATELET 850 350 - 704 K/uL    MPV 9.2 8.9 - 12.9 FL    NRBC 0.0 0  WBC    ABSOLUTE NRBC 0.00 0.00 - 0.01 K/uL    NEUTROPHILS 70 32 - 75 %    LYMPHOCYTES 22 12 - 49 %    MONOCYTES 6 5 - 13 %    EOSINOPHILS 1 0 - 7 %    BASOPHILS 1 0 - 1 %    IMMATURE GRANULOCYTES 0 0.0 - 0.5 %    ABS. NEUTROPHILS 3.9 1.8 - 8.0 K/UL    ABS. LYMPHOCYTES 1.2 0.8 - 3.5 K/UL    ABS. MONOCYTES 0.4 0.0 - 1.0 K/UL    ABS. EOSINOPHILS 0.1 0.0 - 0.4 K/UL    ABS. BASOPHILS 0.0 0.0 - 0.1 K/UL    ABS. IMM.  GRANS. 0.0 0.00 - 0.04 K/UL    DF AUTOMATED     METABOLIC PANEL, COMPREHENSIVE    Collection Time: 12/03/21  5:07 PM   Result Value Ref Range    Sodium 141 136 - 145 mmol/L    Potassium 3.9 3.5 - 5.1 mmol/L    Chloride 103 97 - 108 mmol/L    CO2 24 21 - 32 mmol/L    Anion gap 14 5 - 15 mmol/L    Glucose 117 (H) 65 - 100 mg/dL    BUN 8 6 - 20 MG/DL    Creatinine 0.86 0.70 - 1.30 MG/DL    BUN/Creatinine ratio 9 (L) 12 - 20      GFR est AA >60 >60 ml/min/1.73m2    GFR est non-AA >60 >60 ml/min/1.73m2    Calcium 8.7 8.5 - 10.1 MG/DL    Bilirubin, total 0.3 0.2 - 1.0 MG/DL    ALT (SGPT) 33 12 - 78 U/L    AST (SGOT) 31 15 - 37 U/L    Alk. phosphatase 124 (H) 45 - 117 U/L    Protein, total 7.1 6.4 - 8.2 g/dL    Albumin 3.0 (L) 3.5 - 5.0 g/dL    Globulin 4.1 (H) 2.0 - 4.0 g/dL    A-G Ratio 0.7 (L) 1.1 - 2.2     TROPONIN-HIGH SENSITIVITY    Collection Time: 12/03/21  5:07 PM   Result Value Ref Range    Troponin-High Sensitivity 7 0 - 76 ng/L   EKG, 12 LEAD, INITIAL    Collection Time: 12/03/21  5:08 PM   Result Value Ref Range    Ventricular Rate 114 BPM    Atrial Rate 114 BPM    P-R Interval 142 ms    QRS Duration 88 ms    Q-T Interval 342 ms    QTC Calculation (Bezet) 471 ms    Calculated P Axis 50 degrees    Calculated R Axis 83 degrees    Calculated T Axis 51 degrees    Diagnosis       Sinus tachycardia  Nonspecific T wave abnormality  Abnormal ECG  When compared with ECG of 20-NOV-2021 09:09,  Vent. rate has increased BY  40 BPM     ETHYL ALCOHOL    Collection Time: 12/03/21  6:50 PM   Result Value Ref Range    ALCOHOL(ETHYL),SERUM 118 (H) <10 MG/DL   TROPONIN-HIGH SENSITIVITY    Collection Time: 12/03/21  6:52 PM   Result Value Ref Range    Troponin-High Sensitivity 8 0 - 76 ng/L       CT HEAD WO CONT    Result Date: 12/3/2021  EXAM: CT HEAD WO CONT INDICATION: fall with head trauma COMPARISON: 12/1/2021. CONTRAST: None. TECHNIQUE: Unenhanced CT of the head was performed using 5 mm images. Brain and bone windows were generated. Coronal and sagittal reformats. CT dose reduction was achieved through use of a standardized protocol tailored for this examination and automatic exposure control for dose modulation.   FINDINGS: The ventricles and sulci are normal in size, shape and configuration. . There is mild periventricular white matter hypodensity. There is no intracranial hemorrhage, extra-axial collection, or mass effect. The basilar cisterns are open. No CT evidence of acute infarct. The bone windows demonstrate no abnormalities. The visualized portions of the paranasal sinuses and mastoid air cells are clear. No evidence for acute intracranial trauma    CTA CHEST W OR W WO CONT    Result Date: 12/3/2021  EXAM: CTA CHEST W OR W WO CONT INDICATION: Chest pain and shortness of breath. History of prior pulmonary embolism COMPARISON: 10/23/2021. CONTRAST: 100 mL of Isovue-370. TECHNIQUE: Precontrast  images were obtained to localize the volume for acquisition. Multislice helical CT arteriography was performed from the diaphragm to the thoracic inlet during uneventful rapid bolus intravenous contrast administration. Lung and soft tissue windows were generated. Coronal and sagittal images were generated and 3D post processing consisting of coronal maximum intensity images was performed. CT dose reduction was achieved through use of a standardized protocol tailored for this examination and automatic exposure control for dose modulation. FINDINGS: The lungs are clear of mass, nodule, airspace disease or edema. The pulmonary arteries are well enhanced and no pulmonary emboli are identified. There is no mediastinal or hilar adenopathy or mass. The aorta enhances normally without evidence of aneurysm or dissection. The aortic root is borderline at 4.1 cm. The visualized portions of the upper abdominal organs are are remarkable for diffuse hypodensity of the liver. There is a fat-containing eventration of the right posterior hemidiaphragm. No evidence for pneumonia or pulmonary embolism. . Ectatic thoracic aorta Incidental hepatic steatosis    XR CHEST PORT    Result Date: 12/3/2021  EXAM: XR CHEST PORT INDICATION: chest pain COMPARISON: 11/20/2021 FINDINGS: A portable AP radiograph of the chest was obtained at 1655 hours. The patient is on a cardiac monitor. The lungs are hyperinflated but clear. The cardiac and mediastinal contours and pulmonary vascularity are normal.  The bones and soft tissues are grossly within normal limits. Lung hyperinflation             Recent Results (from the past 24 hour(s))   CBC WITH AUTOMATED DIFF    Collection Time: 12/03/21  5:07 PM   Result Value Ref Range    WBC 5.6 4.1 - 11.1 K/uL    RBC 3.62 (L) 4.10 - 5.70 M/uL    HGB 10.2 (L) 12.1 - 17.0 g/dL    HCT 35.7 (L) 36.6 - 50.3 %    MCV 98.6 80.0 - 99.0 FL    MCH 28.2 26.0 - 34.0 PG    MCHC 28.6 (L) 30.0 - 36.5 g/dL    RDW 15.1 (H) 11.5 - 14.5 %    PLATELET 039 193 - 633 K/uL    MPV 9.2 8.9 - 12.9 FL    NRBC 0.0 0  WBC    ABSOLUTE NRBC 0.00 0.00 - 0.01 K/uL    NEUTROPHILS 70 32 - 75 %    LYMPHOCYTES 22 12 - 49 %    MONOCYTES 6 5 - 13 %    EOSINOPHILS 1 0 - 7 %    BASOPHILS 1 0 - 1 %    IMMATURE GRANULOCYTES 0 0.0 - 0.5 %    ABS. NEUTROPHILS 3.9 1.8 - 8.0 K/UL    ABS. LYMPHOCYTES 1.2 0.8 - 3.5 K/UL    ABS. MONOCYTES 0.4 0.0 - 1.0 K/UL    ABS. EOSINOPHILS 0.1 0.0 - 0.4 K/UL    ABS. BASOPHILS 0.0 0.0 - 0.1 K/UL    ABS. IMM. GRANS. 0.0 0.00 - 0.04 K/UL    DF AUTOMATED     METABOLIC PANEL, COMPREHENSIVE    Collection Time: 12/03/21  5:07 PM   Result Value Ref Range    Sodium 141 136 - 145 mmol/L    Potassium 3.9 3.5 - 5.1 mmol/L    Chloride 103 97 - 108 mmol/L    CO2 24 21 - 32 mmol/L    Anion gap 14 5 - 15 mmol/L    Glucose 117 (H) 65 - 100 mg/dL    BUN 8 6 - 20 MG/DL    Creatinine 0.86 0.70 - 1.30 MG/DL    BUN/Creatinine ratio 9 (L) 12 - 20      GFR est AA >60 >60 ml/min/1.73m2    GFR est non-AA >60 >60 ml/min/1.73m2    Calcium 8.7 8.5 - 10.1 MG/DL    Bilirubin, total 0.3 0.2 - 1.0 MG/DL    ALT (SGPT) 33 12 - 78 U/L    AST (SGOT) 31 15 - 37 U/L    Alk.  phosphatase 124 (H) 45 - 117 U/L    Protein, total 7.1 6.4 - 8.2 g/dL    Albumin 3.0 (L) 3.5 - 5.0 g/dL    Globulin 4.1 (H) 2.0 - 4.0 g/dL    A-G Ratio 0.7 (L) 1.1 - 2.2     TROPONIN-HIGH SENSITIVITY    Collection Time: 12/03/21  5:07 PM   Result Value Ref Range    Troponin-High Sensitivity 7 0 - 76 ng/L   EKG, 12 LEAD, INITIAL    Collection Time: 12/03/21  5:08 PM   Result Value Ref Range    Ventricular Rate 114 BPM    Atrial Rate 114 BPM    P-R Interval 142 ms    QRS Duration 88 ms    Q-T Interval 342 ms    QTC Calculation (Bezet) 471 ms    Calculated P Axis 50 degrees    Calculated R Axis 83 degrees    Calculated T Axis 51 degrees    Diagnosis       Sinus tachycardia  Nonspecific T wave abnormality  Abnormal ECG  When compared with ECG of 20-NOV-2021 09:09,  Vent. rate has increased BY  40 BPM         CT HEAD WO CONT    Result Date: 12/3/2021  EXAM: CT HEAD WO CONT INDICATION: fall with head trauma COMPARISON: 12/1/2021. CONTRAST: None. TECHNIQUE: Unenhanced CT of the head was performed using 5 mm images. Brain and bone windows were generated. Coronal and sagittal reformats. CT dose reduction was achieved through use of a standardized protocol tailored for this examination and automatic exposure control for dose modulation. FINDINGS: The ventricles and sulci are normal in size, shape and configuration. . There is mild periventricular white matter hypodensity. There is no intracranial hemorrhage, extra-axial collection, or mass effect. The basilar cisterns are open. No CT evidence of acute infarct. The bone windows demonstrate no abnormalities. The visualized portions of the paranasal sinuses and mastoid air cells are clear. No evidence for acute intracranial trauma    CTA CHEST W OR W WO CONT    Result Date: 12/3/2021  EXAM: CTA CHEST W OR W WO CONT INDICATION: Chest pain and shortness of breath. History of prior pulmonary embolism COMPARISON: 10/23/2021. CONTRAST: 100 mL of Isovue-370. TECHNIQUE: Precontrast  images were obtained to localize the volume for acquisition.  Multislice helical CT arteriography was performed from the diaphragm to the thoracic inlet during uneventful rapid bolus intravenous contrast administration. Lung and soft tissue windows were generated. Coronal and sagittal images were generated and 3D post processing consisting of coronal maximum intensity images was performed. CT dose reduction was achieved through use of a standardized protocol tailored for this examination and automatic exposure control for dose modulation. FINDINGS: The lungs are clear of mass, nodule, airspace disease or edema. The pulmonary arteries are well enhanced and no pulmonary emboli are identified. There is no mediastinal or hilar adenopathy or mass. The aorta enhances normally without evidence of aneurysm or dissection. The aortic root is borderline at 4.1 cm. The visualized portions of the upper abdominal organs are are remarkable for diffuse hypodensity of the liver. There is a fat-containing eventration of the right posterior hemidiaphragm. No evidence for pneumonia or pulmonary embolism. . Ectatic thoracic aorta Incidental hepatic steatosis    XR CHEST PORT    Result Date: 12/3/2021  EXAM: XR CHEST PORT INDICATION: chest pain COMPARISON: 11/20/2021 FINDINGS: A portable AP radiograph of the chest was obtained at 1655 hours. The patient is on a cardiac monitor. The lungs are hyperinflated but clear. The cardiac and mediastinal contours and pulmonary vascularity are normal.  The bones and soft tissues are grossly within normal limits.      Lung hyperinflation

## 2021-12-04 ENCOUNTER — HOSPITAL ENCOUNTER (EMERGENCY)
Age: 71
Discharge: HOME | End: 2021-12-04
Attending: EMERGENCY MEDICINE
Payer: MEDICARE

## 2021-12-04 VITALS
DIASTOLIC BLOOD PRESSURE: 89 MMHG | TEMPERATURE: 99.6 F | WEIGHT: 177 LBS | OXYGEN SATURATION: 98 % | SYSTOLIC BLOOD PRESSURE: 119 MMHG | HEART RATE: 92 BPM | BODY MASS INDEX: 26.83 KG/M2 | HEIGHT: 68 IN | RESPIRATION RATE: 15 BRPM

## 2021-12-04 DIAGNOSIS — R11.2 NAUSEA AND VOMITING, INTRACTABILITY OF VOMITING NOT SPECIFIED, UNSPECIFIED VOMITING TYPE: Primary | ICD-10-CM

## 2021-12-04 LAB
ANION GAP SERPL CALC-SCNC: 6 MMOL/L (ref 5–15)
BASOPHILS # BLD: 0 K/UL (ref 0–0.1)
BASOPHILS NFR BLD: 1 % (ref 0–1)
BUN SERPL-MCNC: 8 MG/DL (ref 6–20)
BUN/CREAT SERPL: 9 (ref 12–20)
CALCIUM SERPL-MCNC: 9.3 MG/DL (ref 8.5–10.1)
CHLORIDE SERPL-SCNC: 101 MMOL/L (ref 97–108)
CO2 SERPL-SCNC: 28 MMOL/L (ref 21–32)
COMMENT, HOLDF: NORMAL
CREAT SERPL-MCNC: 0.89 MG/DL (ref 0.7–1.3)
DIFFERENTIAL METHOD BLD: ABNORMAL
EOSINOPHIL # BLD: 0 K/UL (ref 0–0.4)
EOSINOPHIL NFR BLD: 1 % (ref 0–7)
ERYTHROCYTE [DISTWIDTH] IN BLOOD BY AUTOMATED COUNT: 15.4 % (ref 11.5–14.5)
ETHANOL SERPL-MCNC: <10 MG/DL
GLUCOSE SERPL-MCNC: 150 MG/DL (ref 65–100)
HCT VFR BLD AUTO: 33.3 % (ref 36.6–50.3)
HGB BLD-MCNC: 10 G/DL (ref 12.1–17)
IMM GRANULOCYTES # BLD AUTO: 0 K/UL (ref 0–0.04)
IMM GRANULOCYTES NFR BLD AUTO: 0 % (ref 0–0.5)
LYMPHOCYTES # BLD: 0.8 K/UL (ref 0.8–3.5)
LYMPHOCYTES NFR BLD: 16 % (ref 12–49)
MCH RBC QN AUTO: 28.5 PG (ref 26–34)
MCHC RBC AUTO-ENTMCNC: 30 G/DL (ref 30–36.5)
MCV RBC AUTO: 94.9 FL (ref 80–99)
MONOCYTES # BLD: 0.4 K/UL (ref 0–1)
MONOCYTES NFR BLD: 8 % (ref 5–13)
NEUTS SEG # BLD: 3.8 K/UL (ref 1.8–8)
NEUTS SEG NFR BLD: 74 % (ref 32–75)
NRBC # BLD: 0 K/UL (ref 0–0.01)
NRBC BLD-RTO: 0 PER 100 WBC
PLATELET # BLD AUTO: 295 K/UL (ref 150–400)
PMV BLD AUTO: 10.9 FL (ref 8.9–12.9)
POTASSIUM SERPL-SCNC: 3.9 MMOL/L (ref 3.5–5.1)
RBC # BLD AUTO: 3.51 M/UL (ref 4.1–5.7)
SAMPLES BEING HELD,HOLD: NORMAL
SODIUM SERPL-SCNC: 135 MMOL/L (ref 136–145)
WBC # BLD AUTO: 5.1 K/UL (ref 4.1–11.1)

## 2021-12-04 PROCEDURE — 74011250636 HC RX REV CODE- 250/636: Performed by: EMERGENCY MEDICINE

## 2021-12-04 PROCEDURE — 36415 COLL VENOUS BLD VENIPUNCTURE: CPT

## 2021-12-04 PROCEDURE — 80048 BASIC METABOLIC PNL TOTAL CA: CPT

## 2021-12-04 PROCEDURE — 85025 COMPLETE CBC W/AUTO DIFF WBC: CPT

## 2021-12-04 PROCEDURE — 82077 ASSAY SPEC XCP UR&BREATH IA: CPT

## 2021-12-04 PROCEDURE — 99284 EMERGENCY DEPT VISIT MOD MDM: CPT

## 2021-12-04 PROCEDURE — 96374 THER/PROPH/DIAG INJ IV PUSH: CPT

## 2021-12-04 RX ORDER — ONDANSETRON 2 MG/ML
8 INJECTION INTRAMUSCULAR; INTRAVENOUS
Status: COMPLETED | OUTPATIENT
Start: 2021-12-04 | End: 2021-12-04

## 2021-12-04 RX ORDER — ONDANSETRON 4 MG/1
4 TABLET, ORALLY DISINTEGRATING ORAL
Qty: 20 TABLET | Refills: 0 | Status: SHIPPED | OUTPATIENT
Start: 2021-12-04

## 2021-12-04 RX ADMIN — SODIUM CHLORIDE 1000 ML: 9 INJECTION, SOLUTION INTRAVENOUS at 08:48

## 2021-12-04 RX ADMIN — ONDANSETRON HYDROCHLORIDE 8 MG: 2 SOLUTION INTRAMUSCULAR; INTRAVENOUS at 08:48

## 2021-12-04 NOTE — ED NOTES
Patient stating wife is on the way to drive him home. Discharge information and education given to patient. Verbalized understanding.

## 2021-12-04 NOTE — ED NOTES
Pt refusing to use restroom w/ bedside commode. Pt taken to waiting room to use restroom and wait for wife for pickup. Pt states he spoke with wife earlier this morning and wife to be in route soon.

## 2021-12-04 NOTE — ED NOTES
Discharge note: The patient was discharged home in stable condition, accompanied by family member. The patient is alert and oriented, is in no respiratory distress . The patient's diagnosis, condition and treatment were explained to patient by Dr Ameena Weber. . The patient expressed understanding of discharge instructions and plan of care. A work note was given to pt. A discharge plan has been developed. A  was involved in the process. Patient assisted out to car via wheelchair to go home with family member.

## 2021-12-04 NOTE — ED NOTES
69 yo male reports to ED via squad with c/o nausea, vomiting, and SOB. Pt has been seen 3 times in last 24 hours; the first two times were d/t c/o SOB and difficulty breathing, and this time pt reports N/V. Pt last vomited 1 hour pta. EMS reports all three times pt has been tachycardic and hypertensive.

## 2021-12-04 NOTE — ED NOTES
Several attempts made to call wife to notify that pt is ready for discharge at this moment. No answer. Pt verbalizes the okay to be discharged to waiting room where he will wait for his wife.

## 2021-12-04 NOTE — ED PROVIDER NOTES
HPI the patient fell yesterday and hit his chest.  Since then he has had multiple episodes of vomiting. He was seen yesterday at the short [facility] because of the fall and vomiting and had a work-up that included a CTA of the chest, all of which was unremarkable. He comes in this morning because he continues to have vomiting. He denies having chest pain, abdominal pain, diarrhea, shortness of breath or other complaints. Past Medical History:   Diagnosis Date    Alcohol abuse     Chronic obstructive pulmonary disease (HCC)     GI bleeding     Thromboembolus (HCC)     PE       Past Surgical History:   Procedure Laterality Date    HX APPENDECTOMY      HX CHOLECYSTECTOMY      HX ENDOSCOPY      HX GI      hernia         No family history on file. Social History     Socioeconomic History    Marital status:      Spouse name: Not on file    Number of children: Not on file    Years of education: Not on file    Highest education level: Not on file   Occupational History    Not on file   Tobacco Use    Smoking status: Never Smoker    Smokeless tobacco: Never Used   Vaping Use    Vaping Use: Never used   Substance and Sexual Activity    Alcohol use: Yes     Comment: Daily drinker; 4oz     Drug use: Never    Sexual activity: Not on file   Other Topics Concern    Not on file   Social History Narrative    Not on file     Social Determinants of Health     Financial Resource Strain:     Difficulty of Paying Living Expenses: Not on file   Food Insecurity:     Worried About Running Out of Food in the Last Year: Not on file    Nhan of Food in the Last Year: Not on file   Transportation Needs:     Lack of Transportation (Medical): Not on file    Lack of Transportation (Non-Medical):  Not on file   Physical Activity:     Days of Exercise per Week: Not on file    Minutes of Exercise per Session: Not on file   Stress:     Feeling of Stress : Not on file   Social Connections:     Frequency of Communication with Friends and Family: Not on file    Frequency of Social Gatherings with Friends and Family: Not on file    Attends Taoist Services: Not on file    Active Member of Clubs or Organizations: Not on file    Attends Club or Organization Meetings: Not on file    Marital Status: Not on file   Intimate Partner Violence:     Fear of Current or Ex-Partner: Not on file    Emotionally Abused: Not on file    Physically Abused: Not on file    Sexually Abused: Not on file   Housing Stability:     Unable to Pay for Housing in the Last Year: Not on file    Number of [place] in the Last Year: Not on file    Unstable Housing in the Last Year: Not on file         ALLERGIES: Patient has no known allergies. Review of Systems   Constitutional: Negative for fever. HENT: Negative for voice change. Eyes: Negative for visual disturbance. Respiratory: Negative for cough and shortness of breath. Cardiovascular: Negative for chest pain. Gastrointestinal: Positive for nausea and vomiting. Negative for abdominal pain. Genitourinary: Negative for flank pain. Musculoskeletal: Negative for back pain. Skin: Negative for rash. Neurological: Negative for headaches. Psychiatric/Behavioral: Negative for confusion. There were no vitals filed for this visit. Physical Exam  Constitutional:       General: He is not in acute distress. Appearance: He is well-developed. HENT:      Head: Normocephalic. Cardiovascular:      Rate and Rhythm: Normal rate. Heart sounds: No murmur heard. Pulmonary:      Effort: Pulmonary effort is normal.      Breath sounds: Normal breath sounds. Abdominal:      Palpations: Abdomen is soft. Tenderness: There is no abdominal tenderness. Musculoskeletal:         General: Normal range of motion. Cervical back: Normal range of motion. Skin:     General: Skin is warm and dry.       Capillary Refill: Capillary refill takes less than 2 seconds. Neurological:      Mental Status: He is alert.    Psychiatric:         Behavior: Behavior normal.          MDM       Procedures

## 2021-12-04 NOTE — ED NOTES
Case management called for assistance, case management unavailable. Pt to d/ra to the waiting room to wait for wife.

## 2021-12-04 NOTE — ED NOTES
Pt's wife Georgette Nugent)  called at [phone] (house) & [phone] (cell). No answer received. Pt aware.

## 2021-12-06 ENCOUNTER — HOSPITAL ENCOUNTER (EMERGENCY)
Age: 71
Discharge: HOME | End: 2021-12-06
Attending: EMERGENCY MEDICINE
Payer: MEDICARE

## 2021-12-06 ENCOUNTER — APPOINTMENT (OUTPATIENT)
Dept: GENERAL RADIOLOGY | Age: 71
End: 2021-12-06
Attending: EMERGENCY MEDICINE
Payer: MEDICARE

## 2021-12-06 VITALS
SYSTOLIC BLOOD PRESSURE: 133 MMHG | OXYGEN SATURATION: 99 % | DIASTOLIC BLOOD PRESSURE: 83 MMHG | HEART RATE: 94 BPM | RESPIRATION RATE: 16 BRPM | TEMPERATURE: 98.2 F

## 2021-12-06 DIAGNOSIS — S20.219A CONTUSION OF CHEST WALL, UNSPECIFIED LATERALITY, INITIAL ENCOUNTER: Primary | ICD-10-CM

## 2021-12-06 LAB
ALBUMIN SERPL-MCNC: 2.6 G/DL (ref 3.5–5)
ALBUMIN/GLOB SERPL: 0.7 {RATIO} (ref 1.1–2.2)
ALP SERPL-CCNC: 108 U/L (ref 45–117)
ALT SERPL-CCNC: 25 U/L (ref 12–78)
ANION GAP SERPL CALC-SCNC: 12 MMOL/L (ref 5–15)
AST SERPL-CCNC: 22 U/L (ref 15–37)
BASOPHILS # BLD: 0 K/UL (ref 0–0.1)
BASOPHILS NFR BLD: 0 % (ref 0–1)
BILIRUB SERPL-MCNC: 0.4 MG/DL (ref 0.2–1)
BUN SERPL-MCNC: 4 MG/DL (ref 6–20)
BUN/CREAT SERPL: 5 (ref 12–20)
CALCIUM SERPL-MCNC: 8.8 MG/DL (ref 8.5–10.1)
CHLORIDE SERPL-SCNC: 105 MMOL/L (ref 97–108)
CO2 SERPL-SCNC: 23 MMOL/L (ref 21–32)
COMMENT, HOLDF: NORMAL
CREAT SERPL-MCNC: 0.75 MG/DL (ref 0.7–1.3)
DIFFERENTIAL METHOD BLD: ABNORMAL
EOSINOPHIL # BLD: 0.2 K/UL (ref 0–0.4)
EOSINOPHIL NFR BLD: 3 % (ref 0–7)
ERYTHROCYTE [DISTWIDTH] IN BLOOD BY AUTOMATED COUNT: 14.8 % (ref 11.5–14.5)
ETHANOL SERPL-MCNC: 196 MG/DL
GLOBULIN SER CALC-MCNC: 3.9 G/DL (ref 2–4)
GLUCOSE SERPL-MCNC: 110 MG/DL (ref 65–100)
HCT VFR BLD AUTO: 32.3 % (ref 36.6–50.3)
HGB BLD-MCNC: 9.8 G/DL (ref 12.1–17)
IMM GRANULOCYTES # BLD AUTO: 0 K/UL (ref 0–0.04)
IMM GRANULOCYTES NFR BLD AUTO: 0 % (ref 0–0.5)
LIPASE SERPL-CCNC: 56 U/L (ref 73–393)
LYMPHOCYTES # BLD: 1.8 K/UL (ref 0.8–3.5)
LYMPHOCYTES NFR BLD: 38 % (ref 12–49)
MCH RBC QN AUTO: 28 PG (ref 26–34)
MCHC RBC AUTO-ENTMCNC: 30.3 G/DL (ref 30–36.5)
MCV RBC AUTO: 92.3 FL (ref 80–99)
MONOCYTES # BLD: 0.4 K/UL (ref 0–1)
MONOCYTES NFR BLD: 8 % (ref 5–13)
NEUTS SEG # BLD: 2.3 K/UL (ref 1.8–8)
NEUTS SEG NFR BLD: 51 % (ref 32–75)
NRBC # BLD: 0 K/UL (ref 0–0.01)
NRBC BLD-RTO: 0 PER 100 WBC
PLATELET # BLD AUTO: 233 K/UL (ref 150–400)
PMV BLD AUTO: 9 FL (ref 8.9–12.9)
POTASSIUM SERPL-SCNC: 3.1 MMOL/L (ref 3.5–5.1)
PROT SERPL-MCNC: 6.5 G/DL (ref 6.4–8.2)
RBC # BLD AUTO: 3.5 M/UL (ref 4.1–5.7)
SAMPLES BEING HELD,HOLD: NORMAL
SODIUM SERPL-SCNC: 140 MMOL/L (ref 136–145)
TROPONIN-HIGH SENSITIVITY: 6 NG/L (ref 0–76)
WBC # BLD AUTO: 4.7 K/UL (ref 4.1–11.1)

## 2021-12-06 PROCEDURE — 93005 ELECTROCARDIOGRAM TRACING: CPT

## 2021-12-06 PROCEDURE — 71120 X-RAY EXAM BREASTBONE 2/>VWS: CPT

## 2021-12-06 PROCEDURE — 99284 EMERGENCY DEPT VISIT MOD MDM: CPT

## 2021-12-06 PROCEDURE — 80053 COMPREHEN METABOLIC PANEL: CPT

## 2021-12-06 PROCEDURE — 74011000250 HC RX REV CODE- 250: Performed by: EMERGENCY MEDICINE

## 2021-12-06 PROCEDURE — 83690 ASSAY OF LIPASE: CPT

## 2021-12-06 PROCEDURE — 36415 COLL VENOUS BLD VENIPUNCTURE: CPT

## 2021-12-06 PROCEDURE — 84484 ASSAY OF TROPONIN QUANT: CPT

## 2021-12-06 PROCEDURE — 82077 ASSAY SPEC XCP UR&BREATH IA: CPT

## 2021-12-06 PROCEDURE — 74011250637 HC RX REV CODE- 250/637: Performed by: EMERGENCY MEDICINE

## 2021-12-06 PROCEDURE — 71046 X-RAY EXAM CHEST 2 VIEWS: CPT

## 2021-12-06 PROCEDURE — 85025 COMPLETE CBC W/AUTO DIFF WBC: CPT

## 2021-12-06 RX ORDER — KETOROLAC TROMETHAMINE 10 MG/1
10 TABLET, FILM COATED ORAL
Qty: 16 TABLET | Refills: 0 | Status: SHIPPED | OUTPATIENT
Start: 2021-12-06

## 2021-12-06 RX ADMIN — ALUMINUM HYDROXIDE, MAGNESIUM HYDROXIDE, AND SIMETHICONE 40 ML: 200; 200; 20 SUSPENSION ORAL at 20:05

## 2021-12-06 RX ADMIN — ALUMINUM HYDROXIDE, MAGNESIUM HYDROXIDE, AND SIMETHICONE 40 ML: 200; 200; 20 SUSPENSION ORAL at 22:00

## 2021-12-06 NOTE — ED PROVIDER NOTES
This is a 49-year-old male who states that yesterday morning he rolled out of bed and landed on his chest.  He is now having pain in the upper portion of his chest.  It tends to come and go. It may last only about 30 minutes and then gets better. He is unable to relate anything that may make this pain worse or better. He has had no shortness of breath, fever or chills, nausea or vomiting and he denies any pain or difficulty elsewhere. He presents by ambulance for this discomfort. It does not appear that he is taken anything at home for this pain. And is hurting nowhere else. Past Medical History:   Diagnosis Date    Alcohol abuse     Chronic obstructive pulmonary disease (HCC)     GI bleeding     Thromboembolus (HCC)     PE       Past Surgical History:   Procedure Laterality Date    HX APPENDECTOMY      HX CHOLECYSTECTOMY      HX ENDOSCOPY      HX GI      hernia         No family history on file. Social History     Socioeconomic History    Marital status:      Spouse name: Not on file    Number of children: Not on file    Years of education: Not on file    Highest education level: Not on file   Occupational History    Not on file   Tobacco Use    Smoking status: Never Smoker    Smokeless tobacco: Never Used   Vaping Use    Vaping Use: Never used   Substance and Sexual Activity    Alcohol use: Yes     Comment: Daily drinker; 4oz     Drug use: Never    Sexual activity: Not on file   Other Topics Concern    Not on file   Social History Narrative    Not on file     Social Determinants of Health     Financial Resource Strain:     Difficulty of Paying Living Expenses: Not on file   Food Insecurity:     Worried About Running Out of Food in the Last Year: Not on file    Nhan of Food in the Last Year: Not on file   Transportation Needs:     Lack of Transportation (Medical): Not on file    Lack of Transportation (Non-Medical):  Not on file   Physical Activity:     Days of Exercise per Week: Not on file    Minutes of Exercise per Session: Not on file   Stress:     Feeling of Stress : Not on file   Social Connections:     Frequency of Communication with Friends and Family: Not on file    Frequency of Social Gatherings with Friends and Family: Not on file    Attends [employer] Services: Not on file    Active Member of [address] [id number] [id number] [employer] or Organizations: Not on file    Attends Club or Organization Meetings: Not on file    Marital Status: Not on file   Intimate Partner Violence:     Fear of Current or Ex-Partner: Not on file    Emotionally Abused: Not on file    Physically Abused: Not on file    Sexually Abused: Not on file   Housing Stability:     Unable to Pay for Housing in the Last Year: Not on file    Number of [place] in the Last Year: Not on file    Unstable Housing in the Last Year: Not on file         ALLERGIES: Patient has no known allergies. Review of Systems   Constitutional: Negative for activity change, appetite change and fatigue. HENT: Negative for ear pain, facial swelling, sore throat and trouble swallowing. Eyes: Negative for pain, discharge and visual disturbance. Respiratory: Negative for chest tightness, shortness of breath and wheezing. Cardiovascular: Positive for chest pain ( Chest wall pain). Negative for palpitations. Gastrointestinal: Negative for abdominal pain, blood in stool, nausea and vomiting. Genitourinary: Negative for difficulty urinating, flank pain and hematuria. Musculoskeletal: Negative for arthralgias, joint swelling, myalgias and neck pain. Skin: Negative for color change and rash. Neurological: Negative for dizziness, weakness, numbness and headaches. Hematological: Negative for adenopathy. Does not bruise/bleed easily. Psychiatric/Behavioral: Negative for behavioral problems, confusion and sleep disturbance. All other systems reviewed and are negative.       Vitals:    12/06/21 1832   BP: 111/73   Pulse: 94 Resp: 16   Temp: 98.4 °F (36.9 °C)   SpO2: 96%            Physical Exam  Vitals and nursing note reviewed. Constitutional:       General: He is not in acute distress. Appearance: He is well-developed. HENT:      Head: Normocephalic and atraumatic. Nose: Nose normal.   Eyes:      General: No scleral icterus. Conjunctiva/sclera: Conjunctivae normal.      Pupils: Pupils are equal, round, and reactive to light. Neck:      Thyroid: No thyromegaly. Vascular: No JVD. Trachea: No tracheal deviation. Comments: No carotid bruits noted. Cardiovascular:      Rate and Rhythm: Normal rate and regular rhythm. Heart sounds: Normal heart sounds. No murmur heard. No friction rub. No gallop. Pulmonary:      Effort: Pulmonary effort is normal. No respiratory distress. Breath sounds: Normal breath sounds. No wheezing or rales. Chest:      Chest wall: No tenderness. Abdominal:      General: Bowel sounds are normal. There is no distension. Palpations: Abdomen is soft. There is no mass. Tenderness: There is no abdominal tenderness. There is no guarding or rebound. Musculoskeletal:         General: No tenderness. Normal range of motion. Cervical back: Normal range of motion and neck supple. Lymphadenopathy:      Cervical: No cervical adenopathy. Skin:     General: Skin is warm and dry. Capillary Refill: Capillary refill takes 2 to 3 seconds. Findings: No erythema or rash. Neurological:      General: No focal deficit present. Mental Status: He is alert and oriented to person, place, and time. Cranial Nerves: No cranial nerve deficit. Coordination: Coordination normal.      Deep Tendon Reflexes: Reflexes are normal and symmetric. Psychiatric:         Behavior: Behavior normal.         Thought Content:  Thought content normal.         Judgment: Judgment normal.          MDM  Number of Diagnoses or Management Options     Amount and/or Complexity of Data Reviewed  Clinical lab tests: ordered and reviewed  Tests in the radiology section of CPT®: ordered and reviewed  Decide to obtain previous medical records or to obtain history from someone other than the patient: yes  Review and summarize past medical records: yes  Discuss the patient with other providers: yes    Risk of Complications, Morbidity, and/or Mortality  Presenting problems: high  Diagnostic procedures: high  Management options: high           Procedures    This is a 45-year-old male fell out of bed yesterday morning and is having pain in his anterior chest. He denies any other injury. Is here for persistent discomfort some of which comes and goes. He denies other discomfort. We will check basic labs and x-ray and reevaluate. 7:49 PM nurse surprises May of patient having some burning chest discomfort. Have ordered a GI cocktail for him. Since labs appear unremarkable. Sternal x-rays failed to demonstrate any evidence of a sternal fracture. 7:52 PM all patient's labs appear unremarkable. Troponin is not yet back. His chest x-ray and sternal x-rays all appear nonacute as well. Still suspect that this patient's chest pain is musculoskeletal in nature. The troponin is reported back at 6. No acute changes in EKG, labs or x-rays are appreciated. We will treat the patient conservatively with rest and have him follow-up with own MD  as needed. 9:17 PM the wife just called and was expressing concern about taking her  back home. She says that he has been having some chest pain which started this morning and she was just very concerned about the cause of the pain. I was told her that he stated to me had fallen out of bed which she said was not true. He is an alcoholic and he has continued to drink. He has had no nausea or vomiting no shortness of breath and no sweating.   She became concerned because he continued with chest pain during the day and called the ambulance to bring him to the hospital.  Here he has not had much discomfort except for little midsternal burning. This improved with GI cocktail. I went over all of the labs with her and it is suggested that there is no medical reason to admit him to the hospital.  I told her that I would check his pancreas with a lipase and if that was negative we should be able to send him home. She advised that she was 40 miles away and did not drive well at night and preferred us to keep him in the hospital.  I told her that we have to have a medical indication for admission and unless there is an abnormality in the lipase I did not feel that we would necessarily have that. She will be called back after the lab returns. Her number is [id number]. The patient's lipase is 52. His blood alcohol is 196. He is given another dose of antacid for his reflux and alcoholic esophagitis. There is no indication for admission to the hospital at this time. We will proceed with discharge. Patient's wife arrived and patient was discharged into her care.

## 2021-12-07 LAB
ATRIAL RATE: 91 BPM
CALCULATED P AXIS, ECG09: 58 DEGREES
CALCULATED R AXIS, ECG10: 69 DEGREES
CALCULATED T AXIS, ECG11: 61 DEGREES
DIAGNOSIS, 93000: NORMAL
P-R INTERVAL, ECG05: 148 MS
Q-T INTERVAL, ECG07: 388 MS
QRS DURATION, ECG06: 90 MS
QTC CALCULATION (BEZET), ECG08: 477 MS
VENTRICULAR RATE, ECG03: 91 BPM

## 2021-12-07 NOTE — DISCHARGE INSTRUCTIONS
You will need to use cool compresses to the areas of chest that are bothering you several times daily. Rest, no lifting pushing or pulling more than about 5 pounds for a few days. After 2 or 3 days of the cool compresses she can switch to moist heat. Follow-up with your own physician in several days if continued difficulty.

## 2022-03-18 PROBLEM — K92.0 VOMITING BLOOD: Status: ACTIVE | Noted: 2021-10-23

## 2022-03-19 PROBLEM — F10.939 ALCOHOL WITHDRAWAL (HCC): Status: ACTIVE | Noted: 2021-11-09

## 2022-03-19 PROBLEM — J44.9 COPD (CHRONIC OBSTRUCTIVE PULMONARY DISEASE) (HCC): Status: ACTIVE | Noted: 2021-11-09

## 2022-03-19 PROBLEM — J44.1 COPD EXACERBATION (HCC): Status: ACTIVE | Noted: 2021-11-09

## 2022-03-20 PROBLEM — F10.10 ALCOHOL ABUSE: Status: ACTIVE | Noted: 2021-10-23

## 2023-05-10 RX ORDER — FLUTICASONE PROPIONATE AND SALMETEROL XINAFOATE 45; 21 UG/1; UG/1
2 AEROSOL, METERED RESPIRATORY (INHALATION) 2 TIMES DAILY
COMMUNITY
Start: 2021-10-15

## 2023-05-10 RX ORDER — LORAZEPAM 0.5 MG/1
TABLET ORAL
COMMUNITY
Start: 2021-10-26

## 2023-05-10 RX ORDER — KETOROLAC TROMETHAMINE 10 MG/1
10 TABLET, FILM COATED ORAL EVERY 6 HOURS PRN
COMMUNITY
Start: 2021-12-06

## 2023-05-10 RX ORDER — LORAZEPAM 1 MG/1
0.5 TABLET ORAL EVERY 8 HOURS PRN
COMMUNITY
Start: 2021-09-27

## 2023-05-10 RX ORDER — ALBUTEROL SULFATE 90 UG/1
2 AEROSOL, METERED RESPIRATORY (INHALATION)
COMMUNITY
Start: 2021-10-06

## 2023-05-10 RX ORDER — ONDANSETRON 4 MG/1
4 TABLET, ORALLY DISINTEGRATING ORAL EVERY 8 HOURS PRN
COMMUNITY
Start: 2021-12-04

## 2023-05-10 RX ORDER — BUDESONIDE 0.5 MG/2ML
500 INHALANT ORAL 2 TIMES DAILY
COMMUNITY

## 2023-05-10 RX ORDER — POLYETHYLENE GLYCOL 3350 17 G/17G
17 POWDER, FOR SOLUTION ORAL DAILY
COMMUNITY
Start: 2021-11-09

## 2023-05-10 RX ORDER — ONDANSETRON 4 MG/1
4 TABLET, FILM COATED ORAL EVERY 8 HOURS PRN
COMMUNITY
Start: 2021-09-27

## 2023-05-10 RX ORDER — PANTOPRAZOLE SODIUM 40 MG/1
40 TABLET, DELAYED RELEASE ORAL 2 TIMES DAILY
COMMUNITY
Start: 2021-10-26

## (undated) DEVICE — TUBING HYDR IRR --